# Patient Record
Sex: FEMALE | Race: WHITE | HISPANIC OR LATINO | Employment: OTHER | URBAN - METROPOLITAN AREA
[De-identification: names, ages, dates, MRNs, and addresses within clinical notes are randomized per-mention and may not be internally consistent; named-entity substitution may affect disease eponyms.]

---

## 2017-06-04 ENCOUNTER — APPOINTMENT (OUTPATIENT)
Dept: RADIOLOGY | Facility: MEDICAL CENTER | Age: 82
End: 2017-06-04
Attending: EMERGENCY MEDICINE
Payer: MEDICARE

## 2017-06-04 ENCOUNTER — HOSPITAL ENCOUNTER (EMERGENCY)
Facility: MEDICAL CENTER | Age: 82
End: 2017-06-04
Attending: EMERGENCY MEDICINE
Payer: MEDICARE

## 2017-06-04 VITALS
HEART RATE: 62 BPM | RESPIRATION RATE: 14 BRPM | HEIGHT: 63 IN | BODY MASS INDEX: 26.58 KG/M2 | WEIGHT: 150 LBS | DIASTOLIC BLOOD PRESSURE: 78 MMHG | SYSTOLIC BLOOD PRESSURE: 99 MMHG | TEMPERATURE: 97.3 F | OXYGEN SATURATION: 93 %

## 2017-06-04 DIAGNOSIS — R60.0 BILATERAL EDEMA OF LOWER EXTREMITY: ICD-10-CM

## 2017-06-04 LAB
ALBUMIN SERPL BCP-MCNC: 4 G/DL (ref 3.2–4.9)
ALBUMIN/GLOB SERPL: 1.5 G/DL
ALP SERPL-CCNC: 61 U/L (ref 30–99)
ALT SERPL-CCNC: 16 U/L (ref 2–50)
ANION GAP SERPL CALC-SCNC: 8 MMOL/L (ref 0–11.9)
AST SERPL-CCNC: 22 U/L (ref 12–45)
BASOPHILS # BLD AUTO: 0.6 % (ref 0–1.8)
BASOPHILS # BLD: 0.05 K/UL (ref 0–0.12)
BILIRUB SERPL-MCNC: 0.5 MG/DL (ref 0.1–1.5)
BNP SERPL-MCNC: 25 PG/ML (ref 0–100)
BUN SERPL-MCNC: 10 MG/DL (ref 8–22)
CALCIUM SERPL-MCNC: 9.6 MG/DL (ref 8.5–10.5)
CHLORIDE SERPL-SCNC: 105 MMOL/L (ref 96–112)
CO2 SERPL-SCNC: 26 MMOL/L (ref 20–33)
CREAT SERPL-MCNC: 0.75 MG/DL (ref 0.5–1.4)
EKG IMPRESSION: NORMAL
EOSINOPHIL # BLD AUTO: 0.12 K/UL (ref 0–0.51)
EOSINOPHIL NFR BLD: 1.5 % (ref 0–6.9)
ERYTHROCYTE [DISTWIDTH] IN BLOOD BY AUTOMATED COUNT: 47.7 FL (ref 35.9–50)
GFR SERPL CREATININE-BSD FRML MDRD: >60 ML/MIN/1.73 M 2
GLOBULIN SER CALC-MCNC: 2.7 G/DL (ref 1.9–3.5)
GLUCOSE SERPL-MCNC: 100 MG/DL (ref 65–99)
HCT VFR BLD AUTO: 43 % (ref 37–47)
HGB BLD-MCNC: 14.2 G/DL (ref 12–16)
IMM GRANULOCYTES # BLD AUTO: 0.02 K/UL (ref 0–0.11)
IMM GRANULOCYTES NFR BLD AUTO: 0.2 % (ref 0–0.9)
INR PPP: 1.16 (ref 0.87–1.13)
LYMPHOCYTES # BLD AUTO: 1.65 K/UL (ref 1–4.8)
LYMPHOCYTES NFR BLD: 20 % (ref 22–41)
MCH RBC QN AUTO: 31.1 PG (ref 27–33)
MCHC RBC AUTO-ENTMCNC: 33 G/DL (ref 33.6–35)
MCV RBC AUTO: 94.3 FL (ref 81.4–97.8)
MONOCYTES # BLD AUTO: 0.56 K/UL (ref 0–0.85)
MONOCYTES NFR BLD AUTO: 6.8 % (ref 0–13.4)
NEUTROPHILS # BLD AUTO: 5.83 K/UL (ref 2–7.15)
NEUTROPHILS NFR BLD: 70.9 % (ref 44–72)
NRBC # BLD AUTO: 0 K/UL
NRBC BLD AUTO-RTO: 0 /100 WBC
PLATELET # BLD AUTO: 243 K/UL (ref 164–446)
PMV BLD AUTO: 10.2 FL (ref 9–12.9)
POTASSIUM SERPL-SCNC: 3.7 MMOL/L (ref 3.6–5.5)
PROT SERPL-MCNC: 6.7 G/DL (ref 6–8.2)
PROTHROMBIN TIME: 15.2 SEC (ref 12–14.6)
RBC # BLD AUTO: 4.56 M/UL (ref 4.2–5.4)
SODIUM SERPL-SCNC: 139 MMOL/L (ref 135–145)
WBC # BLD AUTO: 8.2 K/UL (ref 4.8–10.8)

## 2017-06-04 PROCEDURE — 83880 ASSAY OF NATRIURETIC PEPTIDE: CPT

## 2017-06-04 PROCEDURE — 80053 COMPREHEN METABOLIC PANEL: CPT

## 2017-06-04 PROCEDURE — 700102 HCHG RX REV CODE 250 W/ 637 OVERRIDE(OP): Performed by: EMERGENCY MEDICINE

## 2017-06-04 PROCEDURE — 71010 DX-CHEST-PORTABLE (1 VIEW): CPT

## 2017-06-04 PROCEDURE — A9270 NON-COVERED ITEM OR SERVICE: HCPCS | Performed by: EMERGENCY MEDICINE

## 2017-06-04 PROCEDURE — 93970 EXTREMITY STUDY: CPT | Mod: 26 | Performed by: SURGERY

## 2017-06-04 PROCEDURE — 85610 PROTHROMBIN TIME: CPT

## 2017-06-04 PROCEDURE — 36415 COLL VENOUS BLD VENIPUNCTURE: CPT

## 2017-06-04 PROCEDURE — 93005 ELECTROCARDIOGRAM TRACING: CPT | Performed by: EMERGENCY MEDICINE

## 2017-06-04 PROCEDURE — 85025 COMPLETE CBC W/AUTO DIFF WBC: CPT

## 2017-06-04 PROCEDURE — 93970 EXTREMITY STUDY: CPT

## 2017-06-04 PROCEDURE — 99284 EMERGENCY DEPT VISIT MOD MDM: CPT

## 2017-06-04 RX ORDER — FLUCONAZOLE 100 MG/1
200 TABLET ORAL ONCE
Status: COMPLETED | OUTPATIENT
Start: 2017-06-04 | End: 2017-06-04

## 2017-06-04 RX ADMIN — FLUCONAZOLE 200 MG: 100 TABLET ORAL at 17:25

## 2017-06-04 ASSESSMENT — LIFESTYLE VARIABLES: DO YOU DRINK ALCOHOL: NO

## 2017-06-04 NOTE — ED NOTES
Chief Complaint   Patient presents with   • Leg Swelling     since this am. Pt states she woke up to stinging pain on toe, however both legs appear swollen. Today she took laces out of shoes so they could fit.     To triage by wheelchair with family. Denies CP or SOB. VSS. Explained triage process, to waiting room. Asked to inform RN if questions or concerns arise.

## 2017-06-04 NOTE — ED AVS SNAPSHOT
Home Care Instructions                                                                                                                Alyssa Funez   MRN: [id number]    Department:  [facility], Emergency Dept   Date of Visit:  6/4/2017            [facility], Emergency Dept    [address] [address]    Phone:  [phone]      You were seen by     Esau Vickers M.D.      Your Diagnosis Was     Bilateral edema of lower extremity     R60.0       These are the medications you received during your hospitalization from 06/04/2017 1432 to 06/04/2017 1727     Date/Time Order Dose Route Action    06/04/2017 1725 fluconazole (DIFLUCAN) tablet 200 mg 200 mg Oral Given      Follow-up Information     1. Follow up with [facility].    Contact information    [address] [place] [id number] [phone]      Medication Information     Review all of your home medications and newly ordered medications with your primary doctor and/or pharmacist as soon as possible. Follow medication instructions as directed by your doctor and/or pharmacist.     Please keep your complete medication list with you and share with your physician. Update the information when medications are discontinued, doses are changed, or new medications (including over-the-counter products) are added; and carry medication information at all times in the event of emergency situations.               Medication List      ASK your doctor about these medications        Instructions    Morning Afternoon Evening Bedtime    albuterol 108 (90 BASE) MCG/ACT Aers inhalation aerosol        Inhale 2 Puffs by mouth every four hours as needed for Shortness of Breath.   Dose:  2 Puff                        azithromycin 250 MG Tabs   Commonly known as:  ZITHROMAX Z-JENELLE        Please dispensed, 2 tablets on day one then one tablet by mouth daily for the next 4 days                        * nystatin [id number]  UNIT/GM Crea topical cream   Commonly known as:  MYCOSTATIN        Applied to affected areas 2 times a day until resolution                        * nystatin powder   Commonly known as:  MYCOSTATIN        Applied to affected areas 2 times a day until resolution                        tramadol 50 MG Tabs   Commonly known as:  ULTRAM        Take 1-2 Tabs by mouth every four hours as needed.   Dose:   mg                        * Notice:  This list has 2 medication(s) that are the same as other medications prescribed for you. Read the directions carefully, and ask your doctor or other care provider to review them with you.            Procedures and tests performed during your visit     BTYPE NATRIURETIC PEPTIDE    CBC WITH DIFFERENTIAL    COMP METABOLIC PANEL    DX-CHEST-PORTABLE (1 VIEW)    EKG (ER)    ESTIMATED GFR    LE VENOUS DUPLEX (Specify in Comments Left, Right Or Bilateral)    PROTHROMBIN TIME        Discharge Instructions       Peripheral Edema  You have swelling in your legs (peripheral edema). This swelling is due to excess accumulation of salt and water in your body. Edema may be a sign of heart, kidney or liver disease, or a side effect of a medication. It may also be due to problems in the leg veins. Elevating your legs and using special support stockings may be very helpful, if the cause of the swelling is due to poor venous circulation. Avoid long periods of standing, whatever the cause.  Treatment of edema depends on identifying the cause. Chips, pretzels, pickles and other salty foods should be avoided. Restricting salt in your diet is almost always needed. Water pills (diuretics) are often used to remove the excess salt and water from your body via urine. These medicines prevent the kidney from reabsorbing sodium. This increases urine flow.  Diuretic treatment may also result in lowering of potassium levels in your body. Potassium supplements may be needed if you have to use diuretics daily.  Daily weights can help you keep track of your progress in clearing your edema. You should call your caregiver for follow up care as recommended.  SEEK IMMEDIATE MEDICAL CARE IF:   · You have increased swelling, pain, redness, or heat in your legs.  · You develop shortness of breath, especially when lying down.  · You develop chest or abdominal pain, weakness, or fainting.  · You have a fever.     This information is not intended to replace advice given to you by your health care provider. Make sure you discuss any questions you have with your health care provider.     Document Released: 01/25/2006 Document Revised: 03/11/2013 Document Reviewed: 01/05/2011  [employer] Interactive Patient Education ©2016 [employer]            Patient Information     Patient Information    Following emergency treatment: all patient requiring follow-up care must return either to a private physician or a clinic if your condition worsens before you are able to obtain further medical attention, please return to the emergency room.     Billing Information    At [facility], we work to make the billing process streamlined for our patients.  Our Representatives are here to answer any questions you may have regarding your hospital bill.  If you have insurance coverage and have supplied your insurance information to us, we will submit a claim to your insurer on your behalf.  Should you have any questions regarding your bill, we can be reached online or by phone as follows:  Online: You are able pay your bills online or live chat with our representatives about any billing questions you may have. We are here to help Monday - Friday from 8:00am to 7:30pm and 9:00am - 12:00pm on Saturdays.  Please visit [email or web address]  for more information.   Phone:  [phone] or [phone]    Please note that your emergency physician, surgeon, pathologist, radiologist, anesthesiologist, and other specialists are not  employed by [facility] and will therefore bill separately for their services.  Please contact them directly for any questions concerning their bills at the numbers below:     Emergency Physician Services:  [phone]  [facility]:  [phone]  [facility]:  [phone]  [facility]:  [phone]    1. Your final bill may vary from the amount quoted upon discharge if all procedures are not complete at that time, or if your doctor has additional procedures of which we are not aware. You will receive an additional bill if you return to the Emergency Department at [facility] for suture removal regardless of the facility of which the sutures were placed.     2. Please arrange for settlement of this account at the emergency registration.    3. All self-pay accounts are due in full at the time of treatment.  If you are unable to meet this obligation then payment is expected within 4-5 days.     4. If you have had radiology studies (CT, X-ray, Ultrasound, MRI), you have received a preliminary result during your emergency department visit. Please contact the radiology department [phone] to receive a copy of your final result. Please discuss the Final result with your primary physician or with the follow up physician provided.     Crisis Hotline:  [place] Crisis Hotline:  8-894-ZQMJFMT or 1-988.446.8632  [place] Crisis Hotline:    1-905.706.2206 or 072-980-1662         ED Discharge Follow Up Questions    1. In order to provide you with very good care, we would like to follow up with a phone call in the next few days.  May we have your permission to contact you?     YES /  NO    2. What is the best phone number to call you? (       )_____-__________    3. What is the best time to call you?      Morning  /  Afternoon  /  Evening                   Patient Signature:  ____________________________________________________________    Date:   ____________________________________________________________

## 2017-06-04 NOTE — ED AVS SNAPSHOT
Tittat Access Code: [email or web address]  Expires: 7/4/2017  5:27 PM    Your email address is not on file at "[employer]  Email Addresses are required for you to sign up for Tittat, please contact [phone] to verify your personal information and to provide your email address prior to attempting to register for Tittat.    Alyssa Funez  [address] [place] [address]    Tittat  A secure, online tool to manage your health information     "[employer]"’s Tittat® is a secure, online tool that connects you to your personalized health information from the privacy of your home -- day or night - making it very easy for you to manage your healthcare. Once the activation process is completed, you can even access your medical information using the Tittat benjamín, which is available for free in the Apple Benjamín store or Google Play store.     To learn more about Tittat, visit [email or web address]    There are two levels of access available (as shown below):   My Chart Features  [facility] Primary Care Doctor [facility]  Specialists [facility]  Urgent  Care Non-[facility] Primary Care Doctor   Email your healthcare team securely and privately 24/7 X X X    Manage appointments: schedule your next appointment; view details of past/upcoming appointments X      Request prescription refills. X      View recent personal medical records, including lab and immunizations X X X X   View health record, including health history, allergies, medications X X X X   Read reports about your outpatient visits, procedures, consult and ER notes X X X X   See your discharge summary, which is a recap of your hospital and/or ER visit that includes your diagnosis, lab results, and care plan X X  X     How to register for Tittat:  Once your e-mail address has been verified, follow the following steps to sign up for Tittat.     1. Go to  [email or web address]  2. Click on the Sign Up Now box, which takes you to the New Member Sign Up page. You will  need to provide the following information:  a. Enter your [employer] Access Code exactly as it appears at the top of this page. (You will not need to use this code after you’ve completed the sign-up process. If you do not sign up before the expiration date, you must request a new code.)   b. Enter your date of birth.   c. Enter your home email address.   d. Click Submit, and follow the next screen’s instructions.  3. Create a [employer] I [employer] ID. This will be your Voradius login ID and cannot be changed, so think of one that is secure and easy to remember.  4. Create a [employer] password. You can change your password at any time.  5. Enter your Password Reset Question and Answer. This can be used at a later time if you forget your password.   6. Enter your e-mail address. This allows you to receive e-mail notifications when new information is available in [employer].  7. Click Sign Up. You can now view your health information.    For assistance activating your [employer] account, call [phone]

## 2017-06-04 NOTE — ED AVS SNAPSHOT
6/4/2017    Alyssa Funez  [address] [place] [address]    Dear Alyssa:    [facility] wants to ensure your discharge home is safe and you or your loved ones have had all of your questions answered regarding your care after you leave the hospital.    Below is a list of resources and contact information should you have any questions regarding your hospital stay, follow-up instructions, or active medical symptoms.    Questions or Concerns Regarding… Contact   Medical Questions Related to Your Discharge  (7 days a week, 8am-5pm) Contact a Nurse Care Coordinator   [phone]   Medical Questions Not Related to Your Discharge  (24 hours a day / 7 days a week)  Contact the Nurse Health Line   [phone]    Medications or Discharge Instructions Refer to your discharge packet   or contact your [facility] Primary Care Provider   [phone]   Follow-up Appointment(s) Schedule your appointment via Navera   or contact Scheduling [phone]   Billing Review your statement via Navera  or contact Billing [phone]   Medical Records Review your records via Na[employer]   or contact Medical Records [phone]     You may receive a telephone call within two days of discharge. This call is to make certain you understand your discharge instructions and have the opportunity to have any questions answered. You can also easily access your medical information, test results and upcoming appointments via the Navera free online health management tool. You can learn more and sign up at [employer]/Navera. For assistance setting up your Navera account, please call [phone].    Once again, we want to ensure your discharge home is safe and that you have a clear understanding of any next steps in your care. If you have any questions or concerns, please do not hesitate to contact us, we are here for you. Thank you for choosing [facility] for your healthcare needs.    Sincerely,    Your [facility] Healthcare Team

## 2017-06-05 NOTE — ED PROVIDER NOTES
ED Provider Note    CHIEF COMPLAINT  Chief Complaint   Patient presents with   • Leg Swelling     since this am. Pt states she woke up to stinging pain on toe, however both legs appear swollen. Today she took laces out of shoes so they could fit.       HPI  Alyssa Funez is a 83 y.o. female who presents for evaluation of leg swelling bilaterally. She is brought here by her daughter. She noted leg swelling this morning. Her shoes were somewhat tight. In addition she complains of some burning pain to the right left toe area. She denies any history of heart failure she is not short of breath she has no pleuritic pain she is no abdominal pain, no nausea vomiting. She is actually relatively good health she does have a history of dementia. She's had no fever or chills she has a history of lobectomy of her right lung has chronic shortness of breath with exertion she has moderate salt intake in her diet    REVIEW OF SYSTEMS  See HPI for further details. She denies any fever or chills weight loss sore throat she complains of rash underneath her breasts that she thinks is fungal and all other systems reviewed and negative except as noted above    PAST MEDICAL HISTORY  Past Medical History   Diagnosis Date   • S/P lobectomy of lung 1968   • H/O: hysterectomy    • Breath shortness    • Psychiatric disorder      dementia   • Asthma    • MEDICAL HOME        FAMILY HISTORY  No family history on file.    SOCIAL HISTORY  Social History     Social History   • Marital Status:      Spouse Name: N/A   • Number of Children: N/A   • Years of Education: N/A     Social History Main Topics   • Smoking status: Never Smoker    • Smokeless tobacco: None   • Alcohol Use: Yes      Comment: occ   • Drug Use: No   • Sexual Activity: Not Asked     Other Topics Concern   • None     Social History Narrative    ** Merged History Encounter **            SURGICAL HISTORY  Past Surgical History   Procedure Laterality Date   • Tibia plateau orif   "12/3/2009     Performed by IMTIAZ COPPOLA at SURGERY [facility] ORS   • Gyn surgery       hysterectomy   • Other       left  inguinal hernia 30 yrs ago   • Ercp in or  11/29/2011     Performed by STEPHENIE MONTELONGO at SURGERY [facility] ORS   • Pr removal of lung,segmentectomy  1960     right middle lobe. traumatic injury   • Orif, femur  1993     left leg   • Tibia orif  1993     left after GLF   • Hysterectomy radical  1960       CURRENT MEDICATIONS  Home Medications     Reviewed by Apryl Jensen R.N. (Registered Nurse) on 06/04/17 at 1601  Med List Status: Partial    Medication Last Dose Status    albuterol (VENTOLIN OR PROVENTIL) 108 (90 BASE) MCG/ACT Aero Soln inhalation aerosol  Active    azithromycin (ZITHROMAX Z-JENELLE) 250 MG Tab  Active    nystatin (MYCOSTATIN) 863667 UNIT/GM Cream topical cream  Active    nystatin (MYCOSTATIN) powder  Active    tramadol (ULTRAM) 50 MG Tab  Active                 ALLERGIES  Allergies   Allergen Reactions   • Avelox [Moxifloxacin Hcl In Nacl] Hives   • Codeine Vomiting   • Morphine Vomiting     Pt informed during admission interview that she gets \"terribly sick\" , violently nausea and vomiting. Present note for updating allergy status.   • Cimetidine Rash   • Codeine    • Prednisone Anaphylaxis       PHYSICAL EXAM  VITAL SIGNS: BP 99/78 mmHg  Pulse 62  Temp(Src) 36.3 °C (97.3 °F) (Temporal)  Resp 14  Ht 1.6 m (5' 2.99\")  Wt 68.04 kg (150 lb)  BMI 26.58 kg/m2  SpO2 93%  Constitutional :  Well developed, Well nourished, No acute distress, Non-toxic appearance.   HENT: Head is atraumatic normocephalic moist mucous membranes  Eyes: Normal-appearing nonicteric  Neck: Normal range of motion, No tenderness, Supple, No stridor.   Lymphatic: No cervical or supraclavicular adenopathy.   Cardiovascular: Normal heart rate, Normal rhythm, No murmurs, No rubs, No gallops.   Thorax & Lungs: Equal breath sounds bilaterally no rales rhonchi  Skin: Warm, Dry, erythematous " dermatitis and findings of possible fungal infection of the right breast  Neurologically she is awake alert without focal findings      LE VENOUS DUPLEX (Specify in Comments Left, Right Or Bilateral)   Preliminary Result      DX-CHEST-PORTABLE (1 VIEW)   Final Result      No acute cardiac or pulmonary abnormalities are identified.        Results for orders placed or performed during the hospital encounter of 06/04/17   CBC WITH DIFFERENTIAL   Result Value Ref Range    WBC 8.2 4.8 - 10.8 K/uL    RBC 4.56 4.20 - 5.40 M/uL    Hemoglobin 14.2 12.0 - 16.0 g/dL    Hematocrit 43.0 37.0 - 47.0 %    MCV 94.3 81.4 - 97.8 fL    MCH 31.1 27.0 - 33.0 pg    MCHC 33.0 (L) 33.6 - 35.0 g/dL    RDW 47.7 35.9 - 50.0 fL    Platelet Count 243 164 - 446 K/uL    MPV 10.2 9.0 - 12.9 fL    Neutrophils-Polys 70.90 44.00 - 72.00 %    Lymphocytes 20.00 (L) 22.00 - 41.00 %    Monocytes 6.80 0.00 - 13.40 %    Eosinophils 1.50 0.00 - 6.90 %    Basophils 0.60 0.00 - 1.80 %    Immature Granulocytes 0.20 0.00 - 0.90 %    Nucleated RBC 0.00 /100 WBC    Neutrophils (Absolute) 5.83 2.00 - 7.15 K/uL    Lymphs (Absolute) 1.65 1.00 - 4.80 K/uL    Monos (Absolute) 0.56 0.00 - 0.85 K/uL    Eos (Absolute) 0.12 0.00 - 0.51 K/uL    Baso (Absolute) 0.05 0.00 - 0.12 K/uL    Immature Granulocytes (abs) 0.02 0.00 - 0.11 K/uL    NRBC (Absolute) 0.00 K/uL   COMP METABOLIC PANEL   Result Value Ref Range    Sodium 139 135 - 145 mmol/L    Potassium 3.7 3.6 - 5.5 mmol/L    Chloride 105 96 - 112 mmol/L    Co2 26 20 - 33 mmol/L    Anion Gap 8.0 0.0 - 11.9    Glucose 100 (H) 65 - 99 mg/dL    Bun 10 8 - 22 mg/dL    Creatinine 0.75 0.50 - 1.40 mg/dL    Calcium 9.6 8.5 - 10.5 mg/dL    AST(SGOT) 22 12 - 45 U/L    ALT(SGPT) 16 2 - 50 U/L    Alkaline Phosphatase 61 30 - 99 U/L    Total Bilirubin 0.5 0.1 - 1.5 mg/dL    Albumin 4.0 3.2 - 4.9 g/dL    Total Protein 6.7 6.0 - 8.2 g/dL    Globulin 2.7 1.9 - 3.5 g/dL    A-G Ratio 1.5 g/dL   BTYPE NATRIURETIC PEPTIDE   Result Value Ref  Range    B Natriuretic Peptide 25 0 - 100 pg/mL   PROTHROMBIN TIME   Result Value Ref Range    PT 15.2 (H) 12.0 - 14.6 sec    INR 1.16 (H) 0.87 - 1.13   ESTIMATED GFR   Result Value Ref Range    GFR If African American >60 >60 mL/min/1.73 m 2    GFR If Non African American >60 >60 mL/min/1.73 m 2   EKG (ER)   Result Value Ref Range    Report       [facility] Emergency Dept.    Test Date:  2017  Pt Name:    BABS LAU             Department: ER  MRN:        [id number]                      Room:        14  Gender:     F                            Technician: [id number]  :        1933                   Requested By:DOMINICK LINDA  Order #:    [id number]                    Reading MD:    Measurements  Intervals                                Axis  Rate:       63                           P:          56  GA:         184                          QRS:        -37  QRSD:       102                          T:          53  QT:         472  QTc:        484    Interpretive Statements  SINUS RHYTHM  PROBABLE LEFT ATRIAL ABNORMALITY  INCOMPLETE RBBB AND LAFB  BORDERLINE LOW VOLTAGE IN FRONTAL LEADS  Compared to ECG 2012 03:44:05  Incomplete right bundle-branch block now present  Right bundle-branch block now present  Sinus bradycardia no longer present         EKG interpretation 12-lead tracing sinus rhythm rate 63 incomplete right bundle branch block and left anterior fascicular block borderline low voltage in frontal leads no evidence of acute ST elevation compared to previous EKG to 12  Incomplete right bundle impression is abnormal EKG no evidence of ischemia      COURSE & MEDICAL DECISION MAKING  Pertinent Labs & Imaging studies reviewed. (See chart for details)  Patient is presenting with a complaint of lower leg swelling does not appear particularly severe. The patient has no evidence of renal failure, heart failure. I suspect her swelling is from dependent edema and  previous surgery to her right leg. She is advised to limit her salt intake keep her feet elevated in addition her him to give her a dose of Diflucan for that appears to be a possible candidal infection under her right breast. She is otherwise discharged stable condition. She also had noninvasive studies done which showed no evidence of DVT as a cause of her swelling to the right or left leg. She has no evidence of arterial insufficiency. She is discharged to follow-up with her primary care physician    FINAL IMPRESSION  1. Edema probably dependent in nature  2. Fungal infection right breast  3.      Electronically signed by: Esau Vickers, 6/4/2017

## 2017-06-05 NOTE — ED NOTES
Pt medicated prior to discharge. Pt given discharge instructions; verbalized understanding. RN to answer any questions pt and family had. Pt instructed on care at home and follow up info. VSS. Pt wheeled out to front lobby.

## 2017-06-05 NOTE — DISCHARGE INSTRUCTIONS
Peripheral Edema  You have swelling in your legs (peripheral edema). This swelling is due to excess accumulation of salt and water in your body. Edema may be a sign of heart, kidney or liver disease, or a side effect of a medication. It may also be due to problems in the leg veins. Elevating your legs and using special support stockings may be very helpful, if the cause of the swelling is due to poor venous circulation. Avoid long periods of standing, whatever the cause.  Treatment of edema depends on identifying the cause. Chips, pretzels, pickles and other salty foods should be avoided. Restricting salt in your diet is almost always needed. Water pills (diuretics) are often used to remove the excess salt and water from your body via urine. These medicines prevent the kidney from reabsorbing sodium. This increases urine flow.  Diuretic treatment may also result in lowering of potassium levels in your body. Potassium supplements may be needed if you have to use diuretics daily. Daily weights can help you keep track of your progress in clearing your edema. You should call your caregiver for follow up care as recommended.  SEEK IMMEDIATE MEDICAL CARE IF:   · You have increased swelling, pain, redness, or heat in your legs.  · You develop shortness of breath, especially when lying down.  · You develop chest or abdominal pain, weakness, or fainting.  · You have a fever.     This information is not intended to replace advice given to you by your health care provider. Make sure you discuss any questions you have with your health care provider.     Document Released: 01/25/2006 Document Revised: 03/11/2013 Document Reviewed: 01/05/2011  [employer] Patient Education ©2016 [employer]

## 2017-11-14 ENCOUNTER — OFFICE VISIT (OUTPATIENT)
Dept: URGENT CARE | Facility: PHYSICIAN GROUP | Age: 82
End: 2017-11-14
Payer: MEDICARE

## 2017-11-14 VITALS — RESPIRATION RATE: 16 BRPM | TEMPERATURE: 98.7 F | OXYGEN SATURATION: 94 % | HEART RATE: 89 BPM

## 2017-11-14 DIAGNOSIS — H54.62 VISION LOSS OF LEFT EYE: ICD-10-CM

## 2017-11-14 DIAGNOSIS — H57.12 LEFT EYE PAIN: ICD-10-CM

## 2017-11-14 PROCEDURE — 99203 OFFICE O/P NEW LOW 30 MIN: CPT | Performed by: PHYSICIAN ASSISTANT

## 2017-11-14 ASSESSMENT — ENCOUNTER SYMPTOMS
NAUSEA: 0
EYE REDNESS: 1
FOREIGN BODY SENSATION: 0
EYE PAIN: 1
EYE ITCHING: 0
DOUBLE VISION: 0
BLURRED VISION: 1
VOMITING: 0
CHILLS: 0
FEVER: 0
PHOTOPHOBIA: 0
EYE DISCHARGE: 1

## 2017-11-14 NOTE — PROGRESS NOTES
Subjective:      Alyssa Funez is a 84 y.o. female who presents with Eye Problem (x1mon L eye swelling, redness, burning)            Patient presents today with left eye discomfort and irritation. She reports that the left eye has been red for the last couple of weeks and seems to be worsening. Also reports mild pain behind the eye. Over the last 2 weeks her vision has changed significantly and she reports difficulty seeing out of her left eye today. Her  passed away recently and she reports that she was crying a lot. She thought that her eyes were red and irritated because of crying. Denies any recent trauma. No fevers or chills. No other complaints.      Eye Problem    The left eye is affected. This is a new problem. The current episode started 1 to 4 weeks ago. The problem occurs constantly. The problem has been gradually worsening. There was no injury mechanism. The pain is mild. There is no known exposure to pink eye. She does not wear contacts. Associated symptoms include blurred vision, an eye discharge and eye redness. Pertinent negatives include no double vision, fever, foreign body sensation, itching, nausea, photophobia, recent URI or vomiting. She has tried nothing for the symptoms. The treatment provided no relief.       Review of Systems   Constitutional: Negative for chills and fever.   Eyes: Positive for blurred vision, pain, discharge and redness. Negative for double vision, photophobia and itching.   Gastrointestinal: Negative for nausea and vomiting.     Allergies:Avelox [moxifloxacin hcl in nacl]; Codeine; Morphine; Cimetidine; and Prednisone    No current Epic-ordered outpatient prescriptions on file.     No current [facility]-ordered facility-administered medications on file.        Past Medical History:   Diagnosis Date   • Asthma    • Breath shortness    • H/O: hysterectomy    • MEDICAL HOME    • Psychiatric disorder     dementia   • S/P lobectomy of lung 1968       Social History    Substance Use Topics   • Smoking status: Never Smoker   • Smokeless tobacco: Never Used   • Alcohol use Yes      Comment: occ       No family status information on file.   History reviewed. No pertinent family history.       Objective:     Pulse 89   Temp 37.1 °C (98.7 °F)   Resp 16   SpO2 94%   Breastfeeding? No      Physical Exam   Constitutional: She is oriented to person, place, and time. She appears well-developed and well-nourished. No distress.   HENT:   Head: Normocephalic and atraumatic.   Eyes: Right eye exhibits no discharge. Left eye exhibits no discharge.   Left pupil slightly dilated and slower to react when compared to the right. Left eye is also mildly erythematous. There is clouding of the iris, preventing further evaluation with the ophthalmoscope.   Neck: Normal range of motion. Neck supple.   Cardiovascular: Normal rate.    Pulmonary/Chest: Effort normal.   Neurological: She is alert and oriented to person, place, and time.   Skin: Skin is warm and dry. She is not diaphoretic.   Psychiatric: She has a normal mood and affect. Her behavior is normal. Judgment and thought content normal.   Nursing note and vitals reviewed.              Assessment/Plan:     1. Vision loss of left eye      Gradual over the last couple of weeks. Discussed options with patient and daughter. They agreed to follow-up with Dr. Connors (optometrist) today.   2. Left eye pain      Mild discomfort for the last couple of weeks. Sent to Dr. Connors for further evaluation and possible referral to ophthalmology.     I'm quite concerned about patient's recent loss of sight in her left eye. Discussed with patient and daughter. Spoke with Dr. Connors, local optometrist, who agrees to see the patient today. He reports that if needed he will arrange for follow-up with ophthalmology. Patient and family agreeable to plan. Patient transported by daughter to Dr. Connors's office.      Please note that this dictation was created using  voice recognition software. I have made every reasonable attempt to correct obvious errors, but I expect that there are errors of grammar and possibly content that I did not discover before finalizing the note.

## 2017-11-22 ENCOUNTER — OFFICE VISIT (OUTPATIENT)
Dept: URGENT CARE | Facility: PHYSICIAN GROUP | Age: 82
End: 2017-11-22
Payer: MEDICARE

## 2017-11-22 VITALS
SYSTOLIC BLOOD PRESSURE: 128 MMHG | HEIGHT: 63 IN | OXYGEN SATURATION: 96 % | RESPIRATION RATE: 12 BRPM | WEIGHT: 146.6 LBS | DIASTOLIC BLOOD PRESSURE: 70 MMHG | HEART RATE: 84 BPM | TEMPERATURE: 99.3 F | BODY MASS INDEX: 25.98 KG/M2

## 2017-11-22 DIAGNOSIS — R06.2 WHEEZING: ICD-10-CM

## 2017-11-22 DIAGNOSIS — R05.9 COUGH: ICD-10-CM

## 2017-11-22 DIAGNOSIS — J40 BRONCHITIS: ICD-10-CM

## 2017-11-22 PROCEDURE — 99214 OFFICE O/P EST MOD 30 MIN: CPT | Performed by: PHYSICIAN ASSISTANT

## 2017-11-22 RX ORDER — ERYTHROMYCIN 500 MG/1
500 TABLET, COATED ORAL 3 TIMES DAILY
Qty: 21 TAB | Refills: 0 | Status: SHIPPED | OUTPATIENT
Start: 2017-11-22 | End: 2017-11-29

## 2017-11-22 RX ORDER — ALBUTEROL SULFATE 90 UG/1
2 AEROSOL, METERED RESPIRATORY (INHALATION) EVERY 6 HOURS PRN
Qty: 8.5 G | Refills: 0 | Status: SHIPPED | OUTPATIENT
Start: 2017-11-22 | End: 2022-09-27

## 2017-11-22 ASSESSMENT — ENCOUNTER SYMPTOMS
HEARTBURN: 0
SHORTNESS OF BREATH: 0
RHINORRHEA: 1
HEMOPTYSIS: 0
COUGH: 1
WHEEZING: 1
HEADACHES: 0
MYALGIAS: 0
EYES NEGATIVE: 1
SORE THROAT: 0
CHILLS: 0
FEVER: 0

## 2017-11-22 NOTE — PROGRESS NOTES
"Subjective:      Alyssa Funez is a 84 y.o. female who presents with Cough (wheezing, sob, green mucus, chest congestion, bilateral ear pain, fever)            Cough   This is a new problem. Episode onset: 2 weeks ago. The problem has been unchanged. The problem occurs constantly. The cough is productive of sputum. Associated symptoms include nasal congestion, rhinorrhea and wheezing. Pertinent negatives include no chest pain, chills, ear congestion, ear pain, fever, headaches, heartburn, hemoptysis, myalgias, postnasal drip, sore throat or shortness of breath. Nothing aggravates the symptoms. She has tried nothing for the symptoms. The treatment provided no relief.       Review of Systems   Constitutional: Negative for chills and fever.   HENT: Positive for rhinorrhea. Negative for ear pain, postnasal drip and sore throat.    Eyes: Negative.    Respiratory: Positive for cough and wheezing. Negative for hemoptysis and shortness of breath.    Cardiovascular: Negative for chest pain.   Gastrointestinal: Negative for heartburn.   Musculoskeletal: Negative for myalgias.   Neurological: Negative for headaches.          Objective:     /70   Pulse 84   Temp 37.4 °C (99.3 °F)   Resp 12   Ht 1.6 m (5' 2.99\")   Wt 66.5 kg (146 lb 9.6 oz)   SpO2 96%   BMI 25.98 kg/m²      Physical Exam   Constitutional: She is oriented to person, place, and time. She appears well-developed and well-nourished.   Patient is well appearing   HENT:   Head: Normocephalic and atraumatic.   Right Ear: External ear normal.   Left Ear: External ear normal.   Mouth/Throat: Oropharynx is clear and moist.   Eyes: Conjunctivae and EOM are normal. Pupils are equal, round, and reactive to light.   Neck: Normal range of motion. Neck supple.   Cardiovascular: Normal rate, regular rhythm and normal heart sounds.    Pulmonary/Chest: Effort normal. No respiratory distress. She has wheezes. She has no rales. She exhibits no tenderness.   Wheezes in " all fields bilaterally   Musculoskeletal: Normal range of motion.   Neurological: She is alert and oriented to person, place, and time.   Skin: Skin is warm and dry.   Psychiatric: She has a normal mood and affect. Her behavior is normal. Judgment and thought content normal.   Nursing note and vitals reviewed.              Assessment/Plan:     1. Bronchitis  Patient is well appearing, stable vital signs, slight elevation of temperature without fever-wearing 3 jackets at time of evaluation.  Patient has wheezing bilaterally without concerning associated symptoms of shortness of breath or chest pain.  Antibiotic as directed.  Monitor symptoms and follow-up immediately for any change in symptoms. Follow-up if symptoms change, get worse or new symptoms develop.    - erythromycin base (E-MYCIN) 500 MG tablet; Take 1 Tab by mouth 3 times a day for 7 days.  Dispense: 21 Tab; Refill: 0    2. Cough    - albuterol 108 (90 Base) MCG/ACT Aero Soln inhalation aerosol; Inhale 2 Puffs by mouth every 6 hours as needed for Shortness of Breath.  Dispense: 8.5 g; Refill: 0    3. Wheezing    - albuterol 108 (90 Base) MCG/ACT Aero Soln inhalation aerosol; Inhale 2 Puffs by mouth every 6 hours as needed for Shortness of Breath.  Dispense: 8.5 g; Refill: 0

## 2018-04-03 ENCOUNTER — PATIENT OUTREACH (OUTPATIENT)
Dept: HEALTH INFORMATION MANAGEMENT | Facility: OTHER | Age: 83
End: 2018-04-03

## 2018-04-03 NOTE — PROGRESS NOTES
Attempt #:Final  [facility] Verified: yes  Verify PCP: no    Communication Preference Obtained: no     Annual Wellness Visit Scheduling  1. Scheduling Status:Not Scheduled. Patient states they are not interested // Joann called and said that Alyssa doesn't want to schedule any type of appt's and that now Alyssa is living with her and is receiving the care that she needs.        Care Gap Scheduling (Attempt to Schedule EACH Overdue Care Gap!)  Health Maintenance Due   Topic Date Due   • Annual Wellness Visit  09/24/1933   • PFT SCREENING-FEV1 AND FEV/FVC RATIO / SPIROMETRY SHOULD BE PERFORMED ANNUALLY  09/24/1951   • IMM DTaP/Tdap/Td Vaccine (1 - Tdap) 09/24/1952   • PAP SMEAR  09/24/1954   • MAMMOGRAM  09/24/1973   • COLONOSCOPY  09/24/1983   • IMM ZOSTER VACCINE  09/24/1993   • BONE DENSITY  09/24/1998   • IMM PNEUMOCOCCAL 65+ (ADULT) LOW/MEDIUM RISK SERIES (1 of 2 - PCV13) 09/24/1998       MyChart Activation: already active

## 2018-04-20 NOTE — PROGRESS NOTES
Outcome: Left Message    Please transfer to Patient Outreach Team at [phone] when patient returns call.

## 2018-06-27 ENCOUNTER — OFFICE VISIT (OUTPATIENT)
Dept: URGENT CARE | Facility: PHYSICIAN GROUP | Age: 83
End: 2018-06-27
Payer: MEDICARE

## 2018-06-27 VITALS
RESPIRATION RATE: 16 BRPM | BODY MASS INDEX: 27.29 KG/M2 | HEART RATE: 92 BPM | OXYGEN SATURATION: 92 % | DIASTOLIC BLOOD PRESSURE: 88 MMHG | TEMPERATURE: 97.8 F | WEIGHT: 154 LBS | HEIGHT: 63 IN | SYSTOLIC BLOOD PRESSURE: 142 MMHG

## 2018-06-27 DIAGNOSIS — J98.8 WHEEZING-ASSOCIATED RESPIRATORY INFECTION (WARI): ICD-10-CM

## 2018-06-27 PROCEDURE — 99214 OFFICE O/P EST MOD 30 MIN: CPT | Performed by: PHYSICIAN ASSISTANT

## 2018-06-27 RX ORDER — ALBUTEROL SULFATE 90 UG/1
2 AEROSOL, METERED RESPIRATORY (INHALATION) EVERY 4 HOURS PRN
Qty: 1 INHALER | Refills: 0 | Status: SHIPPED | OUTPATIENT
Start: 2018-06-27 | End: 2022-09-27

## 2018-06-27 RX ORDER — DOXYCYCLINE HYCLATE 100 MG
100 TABLET ORAL 2 TIMES DAILY
Qty: 20 TAB | Refills: 0 | Status: SHIPPED | OUTPATIENT
Start: 2018-06-27 | End: 2018-07-07

## 2018-06-27 NOTE — PROGRESS NOTES
Chief Complaint   Patient presents with   • Wheezing       HISTORY OF PRESENT ILLNESS: Patient is a 84 y.o. female who presents today because she has a 5-7 day history of wheezing, coughing, shortness of breath.  No significant phlegm production at this time.  She has not been taking any medications for her symptoms.    Patient Active Problem List    Diagnosis Date Noted   • Pneumonia 01/19/2015     Priority: High   • Benign obstructive jaundice 11/28/2011     Priority: High   • Cholecystitis 11/28/2011     Priority: High   • Hypothyroidism 11/28/2011     Priority: High   • Hypoxia 01/19/2015     Priority: Medium   • MEDICAL HOME 01/23/2015   • Hyponatremia 01/19/2015   • Bronchiectasis (HCC) 01/19/2015   • Constipation 01/19/2015   • COPD exacerbation ([facility]) 01/18/2015   • MEDICAL HOME    • Hypoxia 02/01/2012   • SOB (shortness of breath) 02/01/2012   • Cough 02/01/2012   • S/P lobectomy of lung 02/01/2012   • BRONCHIECTASIS 02/01/2012   • Dementia 02/01/2012   • Hyponatremia 11/28/2011   • History of bronchiectasis 11/28/2011   • Obstructive chronic bronchitis with exacerbation (HCC) 02/21/2011   • ASTHMA 02/21/2011       Allergies:Avelox [moxifloxacin hcl in nacl]; Codeine; Morphine; Cimetidine; and Prednisone    Current Outpatient Prescriptions Ordered in [facility]   Medication Sig Dispense Refill   • doxycycline (VIBRAMYCIN) 100 MG Tab Take 1 Tab by mouth 2 times a day for 10 days. 20 Tab 0   • albuterol 108 (90 Base) MCG/ACT Aero Soln inhalation aerosol Inhale 2 Puffs by mouth every four hours as needed. 1 Inhaler 0   • albuterol 108 (90 Base) MCG/ACT Aero Soln inhalation aerosol Inhale 2 Puffs by mouth every 6 hours as needed for Shortness of Breath. 8.5 g 0     No current Epic-ordered facility-administered medications on file.        Past Medical History:   Diagnosis Date   • Asthma    • Breath shortness    • H/O: hysterectomy    • MEDICAL HOME    • Psychiatric disorder     dementia   • S/P lobectomy of lung 1968  "      Social History   Substance Use Topics   • Smoking status: Never Smoker   • Smokeless tobacco: Never Used   • Alcohol use No       No family status information on file.   No family history on file.    ROS:  Review of Systems   Constitutional: Negative for fever, chills, weight loss and malaise/fatigue.   HENT: Negative for ear pain, nosebleeds, congestion, sore throat and neck pain.    Eyes: Negative for blurred vision.   Respiratory: Positive for cough, sputum production, shortness of breath and wheezing.    Cardiovascular: Negative for chest pain, palpitations, orthopnea and leg swelling.   Gastrointestinal: Negative for heartburn, nausea, vomiting and abdominal pain.   Genitourinary: Negative for dysuria, urgency and frequency.     Exam:  Blood pressure 142/88, pulse 92, temperature 36.6 °C (97.8 °F), resp. rate 16, height 1.6 m (5' 3\"), weight 69.9 kg (154 lb), SpO2 92 %.  General:  Well nourished, well developed female in NAD  Head:Normocephalic, atraumatic  Eyes: PERRLA, EOM within normal limits, no conjunctival injection, no scleral icterus, visual fields and acuity grossly intact.  Ears: Normal shape and symmetry, no tenderness, no discharge. External canals are without any significant edema or erythema. Tympanic membranes are without any inflammation, no effusion. Gross auditory acuity is intact  Nose: Symmetrical without tenderness, no discharge.  Mouth: reasonable hygiene, no erythema exudates or tonsillar enlargement.  Neck: no masses, range of motion within normal limits, no tracheal deviation. No obvious thyroid enlargement.  Pulmonary: chest is symmetrical with respiration, she has wheezes bilaterally, expiratory rales in the right lower lobe.    Cardiovascular: regular rate and rhythm without murmurs, rubs, or gallops.  Extremities: no clubbing, cyanosis, or edema.    Please note that this dictation was created using voice recognition software. I have made every reasonable attempt to correct " obvious errors, but I expect that there are errors of grammar and possibly content that I did not discover before finalizing the note.    Assessment/Plan:  1. Wheezing-associated respiratory infection (WARI)  doxycycline (VIBRAMYCIN) 100 MG Tab    albuterol 108 (90 Base) MCG/ACT Aero Soln inhalation aerosol       Followup with primary care in the next 7-10 days if not significantly improving, return to the urgent care or go to the emergency room sooner for any worsening of symptoms.

## 2019-02-01 ENCOUNTER — OFFICE VISIT (OUTPATIENT)
Dept: URGENT CARE | Facility: PHYSICIAN GROUP | Age: 84
End: 2019-02-01
Payer: MEDICARE

## 2019-02-01 VITALS
RESPIRATION RATE: 16 BRPM | WEIGHT: 154 LBS | OXYGEN SATURATION: 94 % | HEIGHT: 63 IN | BODY MASS INDEX: 27.29 KG/M2 | DIASTOLIC BLOOD PRESSURE: 80 MMHG | SYSTOLIC BLOOD PRESSURE: 142 MMHG | TEMPERATURE: 97.9 F | HEART RATE: 92 BPM

## 2019-02-01 DIAGNOSIS — J98.8 WHEEZING-ASSOCIATED RESPIRATORY INFECTION (WARI): ICD-10-CM

## 2019-02-01 PROCEDURE — 99214 OFFICE O/P EST MOD 30 MIN: CPT | Performed by: PHYSICIAN ASSISTANT

## 2019-02-01 RX ORDER — AZITHROMYCIN 250 MG/1
TABLET, FILM COATED ORAL
Qty: 6 TAB | Refills: 0 | Status: ON HOLD | OUTPATIENT
Start: 2019-02-01 | End: 2020-11-20

## 2019-02-01 RX ORDER — ALBUTEROL SULFATE 90 UG/1
2 AEROSOL, METERED RESPIRATORY (INHALATION) EVERY 4 HOURS PRN
Qty: 1 INHALER | Refills: 0 | Status: SHIPPED | OUTPATIENT
Start: 2019-02-01 | End: 2022-09-27

## 2019-02-01 RX ORDER — ERYTHROMYCIN 500 MG/1
500 TABLET, COATED ORAL 2 TIMES DAILY
Qty: 14 TAB | Refills: 0 | Status: SHIPPED | OUTPATIENT
Start: 2019-02-01 | End: 2019-02-01

## 2019-02-01 NOTE — PROGRESS NOTES
Chief Complaint   Patient presents with   • Cough       HISTORY OF PRESENT ILLNESS: Patient is a 85 y.o. female who presents today because she has a more one-month history of waxing and waning cough, but gradually worsening.  She has been taking some over-the-counter cough medications without improvement.  Reports fevers, chills and coughing up phlegm.    Patient Active Problem List    Diagnosis Date Noted   • Pneumonia 01/19/2015     Priority: High   • Benign obstructive jaundice 11/28/2011     Priority: High   • Cholecystitis 11/28/2011     Priority: High   • Hypothyroidism 11/28/2011     Priority: High   • Hypoxia 01/19/2015     Priority: Medium   • MEDICAL HOME 01/23/2015   • Hyponatremia 01/19/2015   • Bronchiectasis (HCC) 01/19/2015   • Constipation 01/19/2015   • COPD exacerbation ([facility]) 01/18/2015   • MEDICAL HOME    • Hypoxia 02/01/2012   • SOB (shortness of breath) 02/01/2012   • Cough 02/01/2012   • S/P lobectomy of lung 02/01/2012   • BRONCHIECTASIS 02/01/2012   • Dementia 02/01/2012   • Hyponatremia 11/28/2011   • History of bronchiectasis 11/28/2011   • Obstructive chronic bronchitis with exacerbation (HCC) 02/21/2011   • ASTHMA 02/21/2011       Allergies:Avelox [moxifloxacin hcl in nacl]; Codeine; Morphine; Cimetidine; and Prednisone    Current Outpatient Prescriptions Ordered in [facility]   Medication Sig Dispense Refill   • erythromycin base (E-MYCIN) 500 MG tablet Take 1 Tab by mouth 2 Times a Day for 7 days. 14 Tab 0   • albuterol 108 (90 Base) MCG/ACT Aero Soln inhalation aerosol Inhale 2 Puffs by mouth every four hours as needed. 1 Inhaler 0   • albuterol 108 (90 Base) MCG/ACT Aero Soln inhalation aerosol Inhale 2 Puffs by mouth every four hours as needed. 1 Inhaler 0   • albuterol 108 (90 Base) MCG/ACT Aero Soln inhalation aerosol Inhale 2 Puffs by mouth every 6 hours as needed for Shortness of Breath. 8.5 g 0     No current Epic-ordered facility-administered medications on file.        Past Medical  "History:   Diagnosis Date   • Asthma    • Breath shortness    • H/O: hysterectomy    • MEDICAL HOME    • Psychiatric disorder     dementia   • S/P lobectomy of lung 1968       Social History   Substance Use Topics   • Smoking status: Never Smoker   • Smokeless tobacco: Never Used   • Alcohol use No       No family status information on file.   No family history on file.    ROS:  Review of Systems   Constitutional: Positive for subjective fever, positive for chills, no weight loss and malaise/fatigue.   HENT: Negative for ear pain, nosebleeds, congestion, sore throat and neck pain.    Eyes: Negative for blurred vision.   Respiratory: Positive for cough, sputum production, shortness of breath and wheezing.    Cardiovascular: Negative for chest pain, palpitations, orthopnea and leg swelling.   Gastrointestinal: Negative for heartburn, nausea, vomiting and abdominal pain.   Genitourinary: Negative for dysuria, urgency and frequency.     Exam:  Blood pressure 142/80, pulse 92, temperature 36.6 °C (97.9 °F), temperature source Temporal, resp. rate 16, height 1.6 m (5' 3\"), weight 69.9 kg (154 lb), SpO2 94 %, not currently breastfeeding.  General:  Well nourished, well developed female in NAD  Head:Normocephalic, atraumatic  Eyes: PERRLA, EOM within normal limits, no conjunctival injection, no scleral icterus, visual fields and acuity grossly intact.  Ears: Normal shape and symmetry, no tenderness, no discharge. External canals are without any significant edema or erythema. Tympanic membranes are without any inflammation, no effusion. Gross auditory acuity is intact  Nose: Symmetrical without tenderness, no discharge.  Nasal mucosa is erythematous and edematous bilaterally  Mouth: reasonable hygiene, no erythema exudates or tonsillar enlargement.  Neck: no masses, range of motion within normal limits, no tracheal deviation. No obvious thyroid enlargement.  Pulmonary: chest is symmetrical with respiration, she has scattered " wheezes and rhonchi bilaterally, not clearing with cough   cardiovascular: regular rate and rhythm without murmurs, rubs, or gallops.  Extremities: no clubbing, cyanosis, or edema.    Please note that this dictation was created using voice recognition software. I have made every reasonable attempt to correct obvious errors, but I expect that there are errors of grammar and possibly content that I did not discover before finalizing the note.    Assessment/Plan:  1. Wheezing-associated respiratory infection (WARI)  erythromycin base (E-MYCIN) 500 MG tablet    albuterol 108 (90 Base) MCG/ACT Aero Soln inhalation aerosol       Followup with primary care in the next 7-10 days if not significantly improving, return to the urgent care or go to the emergency room sooner for any worsening of symptoms.

## 2019-02-08 ENCOUNTER — OFFICE VISIT (OUTPATIENT)
Dept: URGENT CARE | Facility: PHYSICIAN GROUP | Age: 84
End: 2019-02-08
Payer: MEDICARE

## 2019-02-08 VITALS
RESPIRATION RATE: 16 BRPM | DIASTOLIC BLOOD PRESSURE: 88 MMHG | BODY MASS INDEX: 27.29 KG/M2 | SYSTOLIC BLOOD PRESSURE: 136 MMHG | OXYGEN SATURATION: 92 % | HEART RATE: 80 BPM | TEMPERATURE: 98.4 F | WEIGHT: 154 LBS | HEIGHT: 63 IN

## 2019-02-08 DIAGNOSIS — J01.90 ACUTE RHINOSINUSITIS: ICD-10-CM

## 2019-02-08 DIAGNOSIS — J22 LOWER RESPIRATORY INFECTION: ICD-10-CM

## 2019-02-08 PROCEDURE — 99214 OFFICE O/P EST MOD 30 MIN: CPT | Performed by: PHYSICIAN ASSISTANT

## 2019-02-08 RX ORDER — DOXYCYCLINE HYCLATE 100 MG
100 TABLET ORAL 2 TIMES DAILY
Qty: 20 TAB | Refills: 0 | Status: SHIPPED | OUTPATIENT
Start: 2019-02-08 | End: 2019-02-18

## 2019-02-08 ASSESSMENT — ENCOUNTER SYMPTOMS
VOMITING: 0
WHEEZING: 1
COUGH: 1
SORE THROAT: 1
SINUS PAIN: 1
NECK PAIN: 1
HEADACHES: 0

## 2019-02-08 NOTE — PROGRESS NOTES
Subjective:   Alyssa Funez is a 85 y.o. female who presents for Cough        Pt seen in urgent care a weeks ago.  Mild improvement in sx, but reports development of UR symptoms, as well. Sinus pressure, congestion, and worsening cough.      URI    This is a new problem. The current episode started in the past 7 days. The problem has been gradually worsening. There has been no fever. Associated symptoms include congestion, coughing, neck pain, sinus pain, a sore throat and wheezing. Pertinent negatives include no ear pain, headaches, plugged ear sensation or vomiting. Treatments tried: azithromycin. The treatment provided no relief.     Review of Systems   HENT: Positive for congestion, sinus pain and sore throat. Negative for ear pain.    Respiratory: Positive for cough and wheezing.    Gastrointestinal: Negative for vomiting.   Musculoskeletal: Positive for neck pain.   Neurological: Negative for headaches.       PMH:  has a past medical history of Asthma; Breath shortness; H/O: hysterectomy; MEDICAL HOME; Psychiatric disorder; and S/P lobectomy of lung (1968).  MEDS:   Current Outpatient Prescriptions:   •  doxycycline (VIBRAMYCIN) 100 MG Tab, Take 1 Tab by mouth 2 times a day for 10 days., Disp: 20 Tab, Rfl: 0  •  albuterol 108 (90 Base) MCG/ACT Aero Soln inhalation aerosol, Inhale 2 Puffs by mouth every four hours as needed., Disp: 1 Inhaler, Rfl: 0  •  azithromycin (ZITHROMAX) 250 MG Tab, Follow package directions, Disp: 6 Tab, Rfl: 0  •  albuterol 108 (90 Base) MCG/ACT Aero Soln inhalation aerosol, Inhale 2 Puffs by mouth every four hours as needed., Disp: 1 Inhaler, Rfl: 0  •  albuterol 108 (90 Base) MCG/ACT Aero Soln inhalation aerosol, Inhale 2 Puffs by mouth every 6 hours as needed for Shortness of Breath., Disp: 8.5 g, Rfl: 0  ALLERGIES:   Allergies   Allergen Reactions   • Avelox [Moxifloxacin Hcl In Nacl] Hives   • Codeine Vomiting   • Morphine Vomiting     Pt informed during admission interview  "that she gets \"terribly sick\" , violently nausea and vomiting. Present note for updating allergy status.   • Cimetidine Rash   • Prednisone Anaphylaxis     SURGHX:   Past Surgical History:   Procedure Laterality Date   • ERCP IN OR  11/29/2011    Performed by STEPHENIE MONTELONGO at SURGERY [facility]   • TIBIA PLATEAU ORIF  12/3/2009    Performed by IMTIAZ COPPOLA at SURGERY [facility]   • ORIF, FEMUR  1993    left leg   • TIBIA ORIF  1993    left after GLF   • PB REMOVAL OF LUNG,SEGMENTECTOMY  1960    right middle lobe. traumatic injury   • HYSTERECTOMY RADICAL  1960   • GYN SURGERY      hysterectomy   • OTHER      left  inguinal hernia 30 yrs ago     SOCHX:  reports that she has never smoked. She has never used smokeless tobacco. She reports that she does not drink alcohol or use drugs.  FH: Family history was reviewed, no pertinent findings to report   Objective:   /88 (BP Location: Right arm, Patient Position: Sitting, BP Cuff Size: Adult)   Pulse 80   Temp 36.9 °C (98.4 °F) (Temporal)   Resp 16   Ht 1.6 m (5' 3\")   Wt 69.9 kg (154 lb)   SpO2 92%   BMI 27.28 kg/m²   Physical Exam   Constitutional: She appears well-developed and well-nourished.  Non-toxic appearance. She does not have a sickly appearance. She does not appear ill. No distress.   HENT:   Head: Normocephalic and atraumatic.   Right Ear: Tympanic membrane, external ear and ear canal normal.   Left Ear: Tympanic membrane, external ear and ear canal normal.   Nose: Rhinorrhea present. Right sinus exhibits maxillary sinus tenderness. Left sinus exhibits maxillary sinus tenderness.   Mouth/Throat: Uvula is midline, oropharynx is clear and moist and mucous membranes are normal. No tonsillar exudate.   Eyes: Conjunctivae are normal.   Neck: Neck supple.   Cardiovascular: Normal rate, regular rhythm and normal heart sounds.  Exam reveals no gallop and no friction rub.    No murmur heard.  Pulmonary/Chest: Effort normal. No respiratory " distress. She has no decreased breath sounds. She has wheezes in the right upper field, the right middle field, the right lower field, the left upper field, the left middle field and the left lower field. She has rhonchi in the right upper field, the right middle field, the right lower field, the left upper field, the left middle field and the left lower field. She has no rales.   Neurological: She is alert.   Skin: Skin is warm, dry and intact. Capillary refill takes less than 2 seconds. She is not diaphoretic.   Psychiatric: She has a normal mood and affect. Her behavior is normal. Thought content normal.   Vitals reviewed.        Assessment/Plan:   1. Lower respiratory infection  - doxycycline (VIBRAMYCIN) 100 MG Tab; Take 1 Tab by mouth 2 times a day for 10 days.  Dispense: 20 Tab; Refill: 0    2. Acute rhinosinusitis    I reviewed records from 2.1.19. I informed patient that I would like to obtain chest x-ray today to better evaluate for pneumonia.  Patient declines due to increased cost of having CXR done through [employer].      Physical exam consistent with bronchitis as well as rhinosinusitis.  I will treat with doxycycline.  This will also cover pneumonia.  If patient's symptoms worsen at all in the next 48 hours she was instructed to return to clinic for reevaluation.  I would like her to go to the [place] location, if possible, in order to obtain a chest x-ray on site.  If patient's symptoms do not improve in 3 days she was instructed to be reevaluated at the [place] location, again, so a chest x-ray can be obtained.    Pt instructed to complete full course of medication despite symptomatic improvement. Pt to take med with meals to alleviate GI upset. Pt to take a probiotic or eat [employer]’s yogurt/ kefir while taking the medication.    Flonase 1 squirt in each nostril, as instructed in clinic, once a day.  Use nasal saline TID to promote drainage.   Salt water gurgles to soothe sore throat.  Ayr saline gel to  moisturize nasal passage and prevent bleeding.    Differential diagnosis, natural history, supportive care, and indications for immediate follow-up discussed.

## 2020-11-15 ENCOUNTER — HOSPITAL ENCOUNTER (INPATIENT)
Facility: MEDICAL CENTER | Age: 85
LOS: 4 days | DRG: 177 | End: 2020-11-20
Attending: EMERGENCY MEDICINE | Admitting: HOSPITALIST
Payer: MEDICARE

## 2020-11-15 ENCOUNTER — APPOINTMENT (OUTPATIENT)
Dept: RADIOLOGY | Facility: MEDICAL CENTER | Age: 85
DRG: 177 | End: 2020-11-15
Payer: MEDICARE

## 2020-11-15 ENCOUNTER — APPOINTMENT (OUTPATIENT)
Dept: RADIOLOGY | Facility: MEDICAL CENTER | Age: 85
DRG: 177 | End: 2020-11-15
Attending: EMERGENCY MEDICINE
Payer: MEDICARE

## 2020-11-15 DIAGNOSIS — U07.1 COVID-19: ICD-10-CM

## 2020-11-15 LAB
ALBUMIN SERPL BCP-MCNC: 4 G/DL (ref 3.2–4.9)
ALBUMIN/GLOB SERPL: 1.4 G/DL
ALP SERPL-CCNC: 81 U/L (ref 30–99)
ALT SERPL-CCNC: 12 U/L (ref 2–50)
ANION GAP SERPL CALC-SCNC: 10 MMOL/L (ref 7–16)
APPEARANCE UR: ABNORMAL
AST SERPL-CCNC: 23 U/L (ref 12–45)
BACTERIA #/AREA URNS HPF: ABNORMAL /HPF
BASOPHILS # BLD AUTO: 0.2 % (ref 0–1.8)
BASOPHILS # BLD: 0.01 K/UL (ref 0–0.12)
BILIRUB SERPL-MCNC: 0.3 MG/DL (ref 0.1–1.5)
BILIRUB UR QL STRIP.AUTO: NEGATIVE
BUN SERPL-MCNC: 13 MG/DL (ref 8–22)
CALCIUM SERPL-MCNC: 8.6 MG/DL (ref 8.5–10.5)
CHLORIDE SERPL-SCNC: 100 MMOL/L (ref 96–112)
CO2 SERPL-SCNC: 24 MMOL/L (ref 20–33)
COLOR UR: YELLOW
CREAT SERPL-MCNC: 0.78 MG/DL (ref 0.5–1.4)
EOSINOPHIL # BLD AUTO: 0 K/UL (ref 0–0.51)
EOSINOPHIL NFR BLD: 0 % (ref 0–6.9)
EPI CELLS #/AREA URNS HPF: ABNORMAL /HPF
ERYTHROCYTE [DISTWIDTH] IN BLOOD BY AUTOMATED COUNT: 48.8 FL (ref 35.9–50)
GLOBULIN SER CALC-MCNC: 2.9 G/DL (ref 1.9–3.5)
GLUCOSE SERPL-MCNC: 97 MG/DL (ref 65–99)
GLUCOSE UR STRIP.AUTO-MCNC: NEGATIVE MG/DL
HCT VFR BLD AUTO: 42.6 % (ref 37–47)
HGB BLD-MCNC: 14.1 G/DL (ref 12–16)
HYALINE CASTS #/AREA URNS LPF: ABNORMAL /LPF
IMM GRANULOCYTES # BLD AUTO: 0.02 K/UL (ref 0–0.11)
IMM GRANULOCYTES NFR BLD AUTO: 0.3 % (ref 0–0.9)
KETONES UR STRIP.AUTO-MCNC: NEGATIVE MG/DL
LACTATE BLD-SCNC: 0.9 MMOL/L (ref 0.5–2)
LEUKOCYTE ESTERASE UR QL STRIP.AUTO: NEGATIVE
LYMPHOCYTES # BLD AUTO: 1.09 K/UL (ref 1–4.8)
LYMPHOCYTES NFR BLD: 16.7 % (ref 22–41)
MCH RBC QN AUTO: 31 PG (ref 27–33)
MCHC RBC AUTO-ENTMCNC: 33.1 G/DL (ref 33.6–35)
MCV RBC AUTO: 93.6 FL (ref 81.4–97.8)
MICRO URNS: ABNORMAL
MONOCYTES # BLD AUTO: 0.59 K/UL (ref 0–0.85)
MONOCYTES NFR BLD AUTO: 9 % (ref 0–13.4)
NEUTROPHILS # BLD AUTO: 4.83 K/UL (ref 2–7.15)
NEUTROPHILS NFR BLD: 73.8 % (ref 44–72)
NITRITE UR QL STRIP.AUTO: POSITIVE
NRBC # BLD AUTO: 0 K/UL
NRBC BLD-RTO: 0 /100 WBC
PH UR STRIP.AUTO: 6 [PH] (ref 5–8)
PLATELET # BLD AUTO: 186 K/UL (ref 164–446)
PMV BLD AUTO: 10.1 FL (ref 9–12.9)
POTASSIUM SERPL-SCNC: 4.1 MMOL/L (ref 3.6–5.5)
PROT SERPL-MCNC: 6.9 G/DL (ref 6–8.2)
PROT UR QL STRIP: NEGATIVE MG/DL
RBC # BLD AUTO: 4.55 M/UL (ref 4.2–5.4)
RBC # URNS HPF: ABNORMAL /HPF
RBC UR QL AUTO: NEGATIVE
SODIUM SERPL-SCNC: 134 MMOL/L (ref 135–145)
SP GR UR STRIP.AUTO: 1.03
UROBILINOGEN UR STRIP.AUTO-MCNC: 0.2 MG/DL
WBC # BLD AUTO: 6.5 K/UL (ref 4.8–10.8)
WBC #/AREA URNS HPF: ABNORMAL /HPF

## 2020-11-15 PROCEDURE — 87086 URINE CULTURE/COLONY COUNT: CPT

## 2020-11-15 PROCEDURE — 85025 COMPLETE CBC W/AUTO DIFF WBC: CPT

## 2020-11-15 PROCEDURE — 83735 ASSAY OF MAGNESIUM: CPT

## 2020-11-15 PROCEDURE — 700102 HCHG RX REV CODE 250 W/ 637 OVERRIDE(OP): Performed by: EMERGENCY MEDICINE

## 2020-11-15 PROCEDURE — 85379 FIBRIN DEGRADATION QUANT: CPT

## 2020-11-15 PROCEDURE — A9270 NON-COVERED ITEM OR SERVICE: HCPCS | Performed by: EMERGENCY MEDICINE

## 2020-11-15 PROCEDURE — 0241U HCHG SARS-COV-2 COVID-19 NFCT DS RESP RNA 4 TRGT MIC: CPT

## 2020-11-15 PROCEDURE — 83605 ASSAY OF LACTIC ACID: CPT

## 2020-11-15 PROCEDURE — 99285 EMERGENCY DEPT VISIT HI MDM: CPT

## 2020-11-15 PROCEDURE — 81001 URINALYSIS AUTO W/SCOPE: CPT

## 2020-11-15 PROCEDURE — 80053 COMPREHEN METABOLIC PANEL: CPT

## 2020-11-15 PROCEDURE — 87040 BLOOD CULTURE FOR BACTERIA: CPT | Mod: 91

## 2020-11-15 PROCEDURE — 86140 C-REACTIVE PROTEIN: CPT

## 2020-11-15 PROCEDURE — 84484 ASSAY OF TROPONIN QUANT: CPT

## 2020-11-15 PROCEDURE — 36415 COLL VENOUS BLD VENIPUNCTURE: CPT

## 2020-11-15 PROCEDURE — 84145 PROCALCITONIN (PCT): CPT

## 2020-11-15 PROCEDURE — 87186 SC STD MICRODIL/AGAR DIL: CPT

## 2020-11-15 PROCEDURE — 71045 X-RAY EXAM CHEST 1 VIEW: CPT

## 2020-11-15 PROCEDURE — 87077 CULTURE AEROBIC IDENTIFY: CPT

## 2020-11-15 RX ORDER — ACETAMINOPHEN 325 MG/1
650 TABLET ORAL ONCE
Status: COMPLETED | OUTPATIENT
Start: 2020-11-15 | End: 2020-11-15

## 2020-11-15 RX ADMIN — ACETAMINOPHEN 650 MG: 325 TABLET, FILM COATED ORAL at 23:23

## 2020-11-16 PROBLEM — J96.01 ACUTE RESPIRATORY FAILURE WITH HYPOXIA (HCC): Status: ACTIVE | Noted: 2020-11-16

## 2020-11-16 PROBLEM — U07.1 COVID-19: Status: ACTIVE | Noted: 2020-11-16

## 2020-11-16 LAB
COVID ORDER STATUS COVID19: NORMAL
CRP SERPL HS-MCNC: 3.31 MG/DL (ref 0–0.75)
D DIMER PPP IA.FEU-MCNC: 0.8 UG/ML (FEU) (ref 0–0.5)
FLUAV RNA SPEC QL NAA+PROBE: NEGATIVE
FLUBV RNA SPEC QL NAA+PROBE: NEGATIVE
LACTATE BLD-SCNC: 0.8 MMOL/L (ref 0.5–2)
MAGNESIUM SERPL-MCNC: 1.7 MG/DL (ref 1.5–2.5)
PROCALCITONIN SERPL-MCNC: 2.36 NG/ML
RSV RNA SPEC QL NAA+PROBE: NEGATIVE
SARS-COV-2 RNA RESP QL NAA+PROBE: DETECTED
SPECIMEN SOURCE: ABNORMAL
TROPONIN T SERPL-MCNC: 17 NG/L (ref 6–19)

## 2020-11-16 PROCEDURE — 83520 IMMUNOASSAY QUANT NOS NONAB: CPT

## 2020-11-16 PROCEDURE — A9270 NON-COVERED ITEM OR SERVICE: HCPCS | Performed by: STUDENT IN AN ORGANIZED HEALTH CARE EDUCATION/TRAINING PROGRAM

## 2020-11-16 PROCEDURE — 700102 HCHG RX REV CODE 250 W/ 637 OVERRIDE(OP): Performed by: STUDENT IN AN ORGANIZED HEALTH CARE EDUCATION/TRAINING PROGRAM

## 2020-11-16 PROCEDURE — 96367 TX/PROPH/DG ADDL SEQ IV INF: CPT

## 2020-11-16 PROCEDURE — 700102 HCHG RX REV CODE 250 W/ 637 OVERRIDE(OP): Performed by: HOSPITALIST

## 2020-11-16 PROCEDURE — 700111 HCHG RX REV CODE 636 W/ 250 OVERRIDE (IP): Performed by: EMERGENCY MEDICINE

## 2020-11-16 PROCEDURE — 94760 N-INVAS EAR/PLS OXIMETRY 1: CPT

## 2020-11-16 PROCEDURE — 770014 HCHG ROOM/CARE - WARD (150)

## 2020-11-16 PROCEDURE — 700105 HCHG RX REV CODE 258: Performed by: EMERGENCY MEDICINE

## 2020-11-16 PROCEDURE — 700105 HCHG RX REV CODE 258: Performed by: HOSPITALIST

## 2020-11-16 PROCEDURE — 700111 HCHG RX REV CODE 636 W/ 250 OVERRIDE (IP): Performed by: HOSPITALIST

## 2020-11-16 PROCEDURE — 96372 THER/PROPH/DIAG INJ SC/IM: CPT

## 2020-11-16 PROCEDURE — 99221 1ST HOSP IP/OBS SF/LOW 40: CPT | Mod: AI | Performed by: HOSPITALIST

## 2020-11-16 PROCEDURE — A9270 NON-COVERED ITEM OR SERVICE: HCPCS | Performed by: HOSPITALIST

## 2020-11-16 PROCEDURE — 96365 THER/PROPH/DIAG IV INF INIT: CPT

## 2020-11-16 PROCEDURE — 94640 AIRWAY INHALATION TREATMENT: CPT

## 2020-11-16 PROCEDURE — 36415 COLL VENOUS BLD VENIPUNCTURE: CPT

## 2020-11-16 PROCEDURE — 700111 HCHG RX REV CODE 636 W/ 250 OVERRIDE (IP): Performed by: STUDENT IN AN ORGANIZED HEALTH CARE EDUCATION/TRAINING PROGRAM

## 2020-11-16 RX ORDER — ONDANSETRON 4 MG/1
4 TABLET, ORALLY DISINTEGRATING ORAL EVERY 4 HOURS PRN
Status: DISCONTINUED | OUTPATIENT
Start: 2020-11-16 | End: 2020-11-16

## 2020-11-16 RX ORDER — FAMOTIDINE 20 MG/1
40 TABLET, FILM COATED ORAL DAILY
Status: DISCONTINUED | OUTPATIENT
Start: 2020-11-16 | End: 2020-11-16

## 2020-11-16 RX ORDER — AMOXICILLIN 250 MG
2 CAPSULE ORAL 2 TIMES DAILY
Status: DISCONTINUED | OUTPATIENT
Start: 2020-11-16 | End: 2020-11-18

## 2020-11-16 RX ORDER — POLYETHYLENE GLYCOL 3350 17 G/17G
1 POWDER, FOR SOLUTION ORAL
Status: DISCONTINUED | OUTPATIENT
Start: 2020-11-16 | End: 2020-11-18

## 2020-11-16 RX ORDER — ALBUTEROL SULFATE 90 UG/1
2 AEROSOL, METERED RESPIRATORY (INHALATION)
Status: DISCONTINUED | OUTPATIENT
Start: 2020-11-16 | End: 2020-11-16

## 2020-11-16 RX ORDER — BISACODYL 10 MG
10 SUPPOSITORY, RECTAL RECTAL
Status: DISCONTINUED | OUTPATIENT
Start: 2020-11-16 | End: 2020-11-18

## 2020-11-16 RX ORDER — GUAIFENESIN/DEXTROMETHORPHAN 100-10MG/5
10 SYRUP ORAL EVERY 6 HOURS PRN
Status: DISCONTINUED | OUTPATIENT
Start: 2020-11-16 | End: 2020-11-18

## 2020-11-16 RX ORDER — DEXAMETHASONE 4 MG/1
6 TABLET ORAL DAILY
Status: DISCONTINUED | OUTPATIENT
Start: 2020-11-16 | End: 2020-11-20 | Stop reason: HOSPADM

## 2020-11-16 RX ORDER — ONDANSETRON HYDROCHLORIDE 4 MG/5ML
4 SOLUTION ORAL EVERY 6 HOURS PRN
Status: DISCONTINUED | OUTPATIENT
Start: 2020-11-16 | End: 2020-11-18

## 2020-11-16 RX ORDER — AZITHROMYCIN 500 MG/1
500 INJECTION, POWDER, LYOPHILIZED, FOR SOLUTION INTRAVENOUS ONCE
Status: COMPLETED | OUTPATIENT
Start: 2020-11-16 | End: 2020-11-16

## 2020-11-16 RX ORDER — ASCORBIC ACID 500 MG
2000 TABLET ORAL DAILY
Status: DISCONTINUED | OUTPATIENT
Start: 2020-11-16 | End: 2020-11-16

## 2020-11-16 RX ORDER — AZITHROMYCIN 250 MG/1
500 TABLET, FILM COATED ORAL DAILY
Status: COMPLETED | OUTPATIENT
Start: 2020-11-17 | End: 2020-11-18

## 2020-11-16 RX ORDER — ALBUTEROL SULFATE 90 UG/1
2 AEROSOL, METERED RESPIRATORY (INHALATION) EVERY 4 HOURS
Status: DISCONTINUED | OUTPATIENT
Start: 2020-11-16 | End: 2020-11-19

## 2020-11-16 RX ORDER — ACETAMINOPHEN 325 MG/1
650 TABLET ORAL EVERY 6 HOURS PRN
Status: DISCONTINUED | OUTPATIENT
Start: 2020-11-16 | End: 2020-11-20 | Stop reason: HOSPADM

## 2020-11-16 RX ORDER — ONDANSETRON 2 MG/ML
4 INJECTION INTRAMUSCULAR; INTRAVENOUS EVERY 4 HOURS PRN
Status: DISCONTINUED | OUTPATIENT
Start: 2020-11-16 | End: 2020-11-16

## 2020-11-16 RX ORDER — AMOXICILLIN AND CLAVULANATE POTASSIUM 875; 125 MG/1; MG/1
1 TABLET, FILM COATED ORAL EVERY 12 HOURS
Status: COMPLETED | OUTPATIENT
Start: 2020-11-16 | End: 2020-11-19

## 2020-11-16 RX ORDER — ONDANSETRON 4 MG/1
4 TABLET, ORALLY DISINTEGRATING ORAL ONCE
Status: COMPLETED | OUTPATIENT
Start: 2020-11-16 | End: 2020-11-16

## 2020-11-16 RX ORDER — LABETALOL HYDROCHLORIDE 5 MG/ML
10 INJECTION, SOLUTION INTRAVENOUS EVERY 4 HOURS PRN
Status: DISCONTINUED | OUTPATIENT
Start: 2020-11-16 | End: 2020-11-20 | Stop reason: HOSPADM

## 2020-11-16 RX ADMIN — ALBUTEROL SULFATE 2 PUFF: 90 AEROSOL, METERED RESPIRATORY (INHALATION) at 11:37

## 2020-11-16 RX ADMIN — CEFTRIAXONE SODIUM 1 G: 1 INJECTION, POWDER, FOR SOLUTION INTRAMUSCULAR; INTRAVENOUS at 02:32

## 2020-11-16 RX ADMIN — AMOXICILLIN AND CLAVULANATE POTASSIUM 1 TABLET: 875; 125 TABLET, FILM COATED ORAL at 20:47

## 2020-11-16 RX ADMIN — GUAIFENESIN AND DEXTROMETHORPHAN 10 ML: 100; 10 SYRUP ORAL at 12:05

## 2020-11-16 RX ADMIN — GUAIFENESIN AND DEXTROMETHORPHAN 10 ML: 100; 10 SYRUP ORAL at 22:33

## 2020-11-16 RX ADMIN — AZITHROMYCIN MONOHYDRATE 500 MG: 500 INJECTION, POWDER, LYOPHILIZED, FOR SOLUTION INTRAVENOUS at 03:39

## 2020-11-16 RX ADMIN — ALBUTEROL SULFATE 2 PUFF: 90 AEROSOL, METERED RESPIRATORY (INHALATION) at 03:39

## 2020-11-16 RX ADMIN — DEXAMETHASONE 6 MG: 4 TABLET ORAL at 06:10

## 2020-11-16 RX ADMIN — ONDANSETRON 4 MG: 4 TABLET, ORALLY DISINTEGRATING ORAL at 15:25

## 2020-11-16 RX ADMIN — SODIUM CHLORIDE 3 G: 900 INJECTION INTRAVENOUS at 06:33

## 2020-11-16 RX ADMIN — ENOXAPARIN SODIUM 40 MG: 100 INJECTION SUBCUTANEOUS at 06:33

## 2020-11-16 RX ADMIN — OXYCODONE HYDROCHLORIDE AND ACETAMINOPHEN 2000 MG: 500 TABLET ORAL at 07:03

## 2020-11-16 RX ADMIN — ALBUTEROL SULFATE 2 PUFF: 90 AEROSOL, METERED RESPIRATORY (INHALATION) at 07:03

## 2020-11-16 RX ADMIN — ALBUTEROL SULFATE 2 PUFF: 90 AEROSOL, METERED RESPIRATORY (INHALATION) at 20:48

## 2020-11-16 RX ADMIN — AMOXICILLIN AND CLAVULANATE POTASSIUM 1 TABLET: 875; 125 TABLET, FILM COATED ORAL at 12:05

## 2020-11-16 ASSESSMENT — ENCOUNTER SYMPTOMS
EYE PAIN: 0
SHORTNESS OF BREATH: 0
DEPRESSION: 0
TREMORS: 0
FALLS: 0
BRUISES/BLEEDS EASILY: 0
SINUS PAIN: 0
PHOTOPHOBIA: 0
SORE THROAT: 0
PALPITATIONS: 0
CONSTIPATION: 0
BLOOD IN STOOL: 0
COUGH: 1
VOMITING: 0
DOUBLE VISION: 0
WEAKNESS: 0
HEADACHES: 0
MYALGIAS: 1
SPUTUM PRODUCTION: 0
NAUSEA: 0
NECK PAIN: 0
PND: 0
CHILLS: 0
DIZZINESS: 0
CLAUDICATION: 0
HALLUCINATIONS: 0
ABDOMINAL PAIN: 0
DIARRHEA: 0
DIAPHORESIS: 0
BLURRED VISION: 0
STRIDOR: 0
FLANK PAIN: 0
BACK PAIN: 0
TINGLING: 0
HEMOPTYSIS: 0
FEVER: 0
HEARTBURN: 0
POLYDIPSIA: 0
WHEEZING: 0
ORTHOPNEA: 0

## 2020-11-16 ASSESSMENT — FIBROSIS 4 INDEX: FIB4 SCORE: 3.11

## 2020-11-16 ASSESSMENT — PAIN DESCRIPTION - PAIN TYPE: TYPE: ACUTE PAIN

## 2020-11-16 ASSESSMENT — COPD QUESTIONNAIRES
DURING THE PAST 4 WEEKS HOW MUCH DID YOU FEEL SHORT OF BREATH: NONE/LITTLE OF THE TIME
COPD SCREENING SCORE: 4
HAVE YOU SMOKED AT LEAST 100 CIGARETTES IN YOUR ENTIRE LIFE: NO/DON'T KNOW
DO YOU EVER COUGH UP ANY MUCUS OR PHLEGM?: YES, A FEW DAYS A WEEK OR MONTH

## 2020-11-16 ASSESSMENT — LIFESTYLE VARIABLES: SUBSTANCE_ABUSE: 0

## 2020-11-16 NOTE — ED NOTES
Patient retrieved from [place]. Patient assisted to [facility] via wheelchair. Patient agrees with triage note.    Placed onto monitor.

## 2020-11-16 NOTE — H&P
Hospital Medicine History & Physical Note    Date of Service  11/16/2020    Primary Care Physician  Pcp Pt States None    Consultants  None    Code Status  Full Code    Chief Complaint  Chief Complaint   Patient presents with   • Cough     x 2 days, dry non productive. Flu like s/s. RA 88-89%. Febrile       History of Presenting Illness  87 y.o. female who presented 11/15/2020 with past medical history of asthma, dementia, history of lobectomy of right lung who presents with generalized weakness, body aches, headaches, mild cough for 2 days.  Patient states that her cough is productive and yellow.  She denies any fever, loss of smell, loss of taste, chills, nausea, vomiting, diarrhea, chest pain, abdominal pain or difficulty urinating.  Patient states that she has bilateral lower extremity edema that is been present since June after injuring her right leg.  Chest x-ray found left lower lobe infiltrate    Review of Systems  Review of Systems   Constitutional: Positive for malaise/fatigue. Negative for chills, diaphoresis and fever.   HENT: Negative for congestion, ear discharge, ear pain, hearing loss, nosebleeds, sinus pain, sore throat and tinnitus.    Eyes: Negative for blurred vision, double vision, photophobia and pain.   Respiratory: Positive for cough. Negative for hemoptysis, sputum production, shortness of breath, wheezing and stridor.    Cardiovascular: Negative for chest pain, palpitations, orthopnea, claudication, leg swelling and PND.   Gastrointestinal: Negative for abdominal pain, blood in stool, constipation, diarrhea, heartburn, melena, nausea and vomiting.   Genitourinary: Negative for dysuria, flank pain, frequency, hematuria and urgency.   Musculoskeletal: Positive for myalgias. Negative for back pain, falls, joint pain and neck pain.   Skin: Negative for itching and rash.   Neurological: Negative for dizziness, tingling, tremors, weakness and headaches.   Endo/Heme/Allergies: Negative for  "environmental allergies and polydipsia. Does not bruise/bleed easily.   Psychiatric/Behavioral: Negative for depression, hallucinations, substance abuse and suicidal ideas.       Past Medical History   has a past medical history of Asthma, Breath shortness, H/O: hysterectomy, MEDICAL HOME, Psychiatric disorder, and S/P lobectomy of lung (1968).    Surgical History   has a past surgical history that includes tibia plateau orif (12/3/2009); gyn surgery; other; ercp in or (11/29/2011); pr removal of lung,segmentectomy (1960); orif, femur (1993); tibia orif (1993); and hysterectomy radical (1960).     Family History  family history is not on file.     Social History   reports that she has never smoked. She has never used smokeless tobacco. She reports that she does not drink alcohol or use drugs.    Allergies  Allergies   Allergen Reactions   • Avelox [Moxifloxacin Hcl In Nacl] Hives   • Codeine Vomiting   • Morphine Vomiting     Pt informed during admission interview that she gets \"terribly sick\" , violently nausea and vomiting. Present note for updating allergy status.   • Cimetidine Rash   • Prednisone Anaphylaxis       Medications  Prior to Admission Medications   Prescriptions Last Dose Informant Patient Reported? Taking?   albuterol 108 (90 Base) MCG/ACT Aero Soln inhalation aerosol   No No   Sig: Inhale 2 Puffs by mouth every 6 hours as needed for Shortness of Breath.   albuterol 108 (90 Base) MCG/ACT Aero Soln inhalation aerosol   No No   Sig: Inhale 2 Puffs by mouth every four hours as needed.   albuterol 108 (90 Base) MCG/ACT Aero Soln inhalation aerosol   No No   Sig: Inhale 2 Puffs by mouth every four hours as needed.   azithromycin (ZITHROMAX) 250 MG Tab   No No   Sig: Follow package directions      Facility-Administered Medications: None       Physical Exam  Temp:  [37 °C (98.6 °F)-38.2 °C (100.8 °F)] 37 °C (98.6 °F)  Pulse:  [66-93] 66  Resp:  [17-20] 20  BP: ()/(56-70) 99/58  SpO2:  [87 %-97 %] 96 " %    Physical Exam  Vitals signs and nursing note reviewed.   Constitutional:       General: She is not in acute distress.     Appearance: Normal appearance. She is not ill-appearing, toxic-appearing or diaphoretic.   HENT:      Head: Normocephalic and atraumatic.      Nose: No congestion or rhinorrhea.      Mouth/Throat:      Pharynx: No oropharyngeal exudate or posterior oropharyngeal erythema.   Eyes:      General: No scleral icterus.  Neck:      Musculoskeletal: No neck rigidity or muscular tenderness.      Vascular: No carotid bruit.   Cardiovascular:      Rate and Rhythm: Normal rate and regular rhythm.      Pulses: Normal pulses.      Heart sounds: Normal heart sounds. No murmur. No friction rub. No gallop.    Pulmonary:      Effort: Pulmonary effort is normal. No respiratory distress.      Breath sounds: Normal breath sounds. No stridor. No wheezing or rhonchi.   Abdominal:      General: Abdomen is flat. There is no distension.      Palpations: There is no mass.      Tenderness: There is no abdominal tenderness. There is no left CVA tenderness, guarding or rebound.      Hernia: No hernia is present.   Musculoskeletal: Normal range of motion.         General: No swelling.      Right lower leg: No edema.      Left lower leg: No edema.   Lymphadenopathy:      Cervical: No cervical adenopathy.   Skin:     General: Skin is warm and dry.      Capillary Refill: Capillary refill takes more than 3 seconds.      Coloration: Skin is not jaundiced or pale.      Findings: No bruising or erythema.   Neurological:      Mental Status: She is alert.         Laboratory:  Recent Labs     11/15/20  2300   WBC 6.5   RBC 4.55   HEMOGLOBIN 14.1   HEMATOCRIT 42.6   MCV 93.6   MCH 31.0   MCHC 33.1*   RDW 48.8   PLATELETCT 186   MPV 10.1     Recent Labs     11/15/20  2300   SODIUM 134*   POTASSIUM 4.1   CHLORIDE 100   CO2 24   GLUCOSE 97   BUN 13   CREATININE 0.78   CALCIUM 8.6     Recent Labs     11/15/20  2300   ALTSGPT 12    ASTSGOT 23   ALKPHOSPHAT 81   TBILIRUBIN 0.3   GLUCOSE 97         No results for input(s): NTPROBNP in the last 72 hours.      No results for input(s): TROPONINT in the last 72 hours.    Imaging:  DX-CHEST-PORTABLE (1 VIEW)   Final Result         1.  Left basilar atelectasis or early infiltrate   2.  Atherosclerosis            Assessment/Plan:  I anticipate this patient will require at least two midnights for appropriate medical management, necessitating inpatient admission.    * COVID-19  Assessment & Plan  Monitor O2 status closely  Prone positioning and frequent change position  ISS and ambulation aggressively  Inflammatory markers every 48 hours  Prophylactic Lovenox  Decadron 6 mg for 10 days  Consult ID for Remdisivir       Acute respiratory failure with hypoxia (HCC)  Assessment & Plan  On 2 L of O2 above baseline  Encourage ISS, ambulation and prone positioning    Mild intermittent asthma- (present on admission)  Assessment & Plan  Not in exacerbation continue home inhalers

## 2020-11-16 NOTE — ED NOTES
Unable to collect culture respiratory with gram stain, patient unable to cough up mucus at this time for sample

## 2020-11-16 NOTE — PROGRESS NOTES
87-year-old female with a past medical history of mild asthma, dementia and history of right lobectomy was admitted on 11/15/2020 for mild acute hypoxic respiratory failure.  Patient only requiring 1 L nasal cannula.  Chest x-ray with left atelectasis versus infiltrate.  Procalcitonin high.  Patient was started on p.o. Decadron, and azithromycin/Augmentin antibiotic regimen.  Patient is to be transferred to the ACS for continuity of care.

## 2020-11-16 NOTE — ED NOTES
Med rec completed per pt's home pharmacy ([facility])  Pt unable to participate in an interview   No answer from pt's family

## 2020-11-16 NOTE — ASSESSMENT & PLAN NOTE
Monitor O2 status closely  Prone positioning and frequent change position  ISS and ambulation aggressively  Inflammatory markers every 48 hours  Prophylactic Lovenox  Decadron 6 mg for 10 days  Consider ID consult for remdesivir if patient fails to continue to improve.

## 2020-11-16 NOTE — ED NOTES
Pt requested medication for her cough. Medicated per MAR with robitussin. Also given abx.   Denies further needs.  Updated family via telephone.

## 2020-11-16 NOTE — ED TRIAGE NOTES
Chief Complaint   Patient presents with   • Cough     x 2 days, dry non productive. Flu like s/s. RA 88-89%. Febrile     Pt has flu like s/s w/ dry non productive cough x 2 days. RA oxygen sat 88-89%. Febrile in triage. Hx asthma.    /70   Pulse 93   Temp (!) 38.2 °C (100.8 °F) (Temporal)   Resp 17   SpO2 89%

## 2020-11-16 NOTE — ED NOTES
Assumed care of pt, report received. Rounded on pt. Reported need to use restroom. Pt assisted onto bedside commode. Tolerated well. Returned to bed. VS rechecked and stable.

## 2020-11-16 NOTE — ED NOTES
Mary, employee ID [id number] from Lab called with critical result of COVID positive at 0204. Critical lab result read back to Mary.   Dr. Yeboah notified of critical lab result at 0204.  Critical lab result read back by Dr. Yeboah.

## 2020-11-17 LAB
ALBUMIN SERPL BCP-MCNC: 3.6 G/DL (ref 3.2–4.9)
ALBUMIN/GLOB SERPL: 1.1 G/DL
ALP SERPL-CCNC: 77 U/L (ref 30–99)
ALT SERPL-CCNC: 14 U/L (ref 2–50)
ANION GAP SERPL CALC-SCNC: 10 MMOL/L (ref 7–16)
AST SERPL-CCNC: 27 U/L (ref 12–45)
BASOPHILS # BLD AUTO: 0.2 % (ref 0–1.8)
BASOPHILS # BLD: 0.01 K/UL (ref 0–0.12)
BILIRUB SERPL-MCNC: 0.3 MG/DL (ref 0.1–1.5)
BUN SERPL-MCNC: 15 MG/DL (ref 8–22)
CALCIUM SERPL-MCNC: 8.5 MG/DL (ref 8.5–10.5)
CHLORIDE SERPL-SCNC: 102 MMOL/L (ref 96–112)
CHOLEST SERPL-MCNC: 124 MG/DL (ref 100–199)
CO2 SERPL-SCNC: 24 MMOL/L (ref 20–33)
CREAT SERPL-MCNC: 0.72 MG/DL (ref 0.5–1.4)
EOSINOPHIL # BLD AUTO: 0 K/UL (ref 0–0.51)
EOSINOPHIL NFR BLD: 0 % (ref 0–6.9)
ERYTHROCYTE [DISTWIDTH] IN BLOOD BY AUTOMATED COUNT: 49.6 FL (ref 35.9–50)
EST. AVERAGE GLUCOSE BLD GHB EST-MCNC: 128 MG/DL
GLOBULIN SER CALC-MCNC: 3.2 G/DL (ref 1.9–3.5)
GLUCOSE SERPL-MCNC: 131 MG/DL (ref 65–99)
HBA1C MFR BLD: 6.1 % (ref 0–5.6)
HCT VFR BLD AUTO: 44.3 % (ref 37–47)
HDLC SERPL-MCNC: 53 MG/DL
HGB BLD-MCNC: 13.9 G/DL (ref 12–16)
IMM GRANULOCYTES # BLD AUTO: 0.03 K/UL (ref 0–0.11)
IMM GRANULOCYTES NFR BLD AUTO: 0.5 % (ref 0–0.9)
LDLC SERPL CALC-MCNC: 48 MG/DL
LYMPHOCYTES # BLD AUTO: 0.63 K/UL (ref 1–4.8)
LYMPHOCYTES NFR BLD: 10 % (ref 22–41)
MCH RBC QN AUTO: 30 PG (ref 27–33)
MCHC RBC AUTO-ENTMCNC: 31.4 G/DL (ref 33.6–35)
MCV RBC AUTO: 95.5 FL (ref 81.4–97.8)
MONOCYTES # BLD AUTO: 0.21 K/UL (ref 0–0.85)
MONOCYTES NFR BLD AUTO: 3.3 % (ref 0–13.4)
NEUTROPHILS # BLD AUTO: 5.43 K/UL (ref 2–7.15)
NEUTROPHILS NFR BLD: 86 % (ref 44–72)
NRBC # BLD AUTO: 0 K/UL
NRBC BLD-RTO: 0 /100 WBC
PLATELET # BLD AUTO: 208 K/UL (ref 164–446)
PMV BLD AUTO: 10.6 FL (ref 9–12.9)
POTASSIUM SERPL-SCNC: 4.3 MMOL/L (ref 3.6–5.5)
PROT SERPL-MCNC: 6.8 G/DL (ref 6–8.2)
RBC # BLD AUTO: 4.64 M/UL (ref 4.2–5.4)
SODIUM SERPL-SCNC: 136 MMOL/L (ref 135–145)
TRIGL SERPL-MCNC: 113 MG/DL (ref 0–149)
WBC # BLD AUTO: 6.3 K/UL (ref 4.8–10.8)

## 2020-11-17 PROCEDURE — 36415 COLL VENOUS BLD VENIPUNCTURE: CPT

## 2020-11-17 PROCEDURE — 700102 HCHG RX REV CODE 250 W/ 637 OVERRIDE(OP): Performed by: STUDENT IN AN ORGANIZED HEALTH CARE EDUCATION/TRAINING PROGRAM

## 2020-11-17 PROCEDURE — 83036 HEMOGLOBIN GLYCOSYLATED A1C: CPT

## 2020-11-17 PROCEDURE — 700102 HCHG RX REV CODE 250 W/ 637 OVERRIDE(OP): Performed by: HOSPITALIST

## 2020-11-17 PROCEDURE — A9270 NON-COVERED ITEM OR SERVICE: HCPCS | Performed by: STUDENT IN AN ORGANIZED HEALTH CARE EDUCATION/TRAINING PROGRAM

## 2020-11-17 PROCEDURE — 94760 N-INVAS EAR/PLS OXIMETRY 1: CPT

## 2020-11-17 PROCEDURE — 770014 HCHG ROOM/CARE - WARD (150)

## 2020-11-17 PROCEDURE — 85025 COMPLETE CBC W/AUTO DIFF WBC: CPT

## 2020-11-17 PROCEDURE — 80061 LIPID PANEL: CPT

## 2020-11-17 PROCEDURE — 99233 SBSQ HOSP IP/OBS HIGH 50: CPT | Performed by: HOSPITALIST

## 2020-11-17 PROCEDURE — A9270 NON-COVERED ITEM OR SERVICE: HCPCS | Performed by: HOSPITALIST

## 2020-11-17 PROCEDURE — 700111 HCHG RX REV CODE 636 W/ 250 OVERRIDE (IP): Performed by: HOSPITALIST

## 2020-11-17 PROCEDURE — 80053 COMPREHEN METABOLIC PANEL: CPT

## 2020-11-17 RX ADMIN — ALBUTEROL SULFATE 2 PUFF: 90 AEROSOL, METERED RESPIRATORY (INHALATION) at 05:26

## 2020-11-17 RX ADMIN — DOCUSATE SODIUM 50 MG AND SENNOSIDES 8.6 MG 2 TABLET: 8.6; 5 TABLET, FILM COATED ORAL at 05:26

## 2020-11-17 RX ADMIN — ACETAMINOPHEN 650 MG: 325 TABLET, FILM COATED ORAL at 00:17

## 2020-11-17 RX ADMIN — ALBUTEROL SULFATE 2 PUFF: 90 AEROSOL, METERED RESPIRATORY (INHALATION) at 10:00

## 2020-11-17 RX ADMIN — ALBUTEROL SULFATE 2 PUFF: 90 AEROSOL, METERED RESPIRATORY (INHALATION) at 14:00

## 2020-11-17 RX ADMIN — AMOXICILLIN AND CLAVULANATE POTASSIUM 1 TABLET: 875; 125 TABLET, FILM COATED ORAL at 05:26

## 2020-11-17 RX ADMIN — ENOXAPARIN SODIUM 40 MG: 100 INJECTION SUBCUTANEOUS at 05:25

## 2020-11-17 RX ADMIN — ALBUTEROL SULFATE 2 PUFF: 90 AEROSOL, METERED RESPIRATORY (INHALATION) at 18:00

## 2020-11-17 RX ADMIN — AMOXICILLIN AND CLAVULANATE POTASSIUM 1 TABLET: 875; 125 TABLET, FILM COATED ORAL at 17:19

## 2020-11-17 RX ADMIN — ALBUTEROL SULFATE 2 PUFF: 90 AEROSOL, METERED RESPIRATORY (INHALATION) at 21:58

## 2020-11-17 RX ADMIN — AZITHROMYCIN 500 MG: 250 TABLET, FILM COATED ORAL at 05:26

## 2020-11-17 RX ADMIN — DEXAMETHASONE 6 MG: 4 TABLET ORAL at 05:26

## 2020-11-17 ASSESSMENT — FIBROSIS 4 INDEX: FIB4 SCORE: 3.02

## 2020-11-17 ASSESSMENT — PAIN DESCRIPTION - PAIN TYPE: TYPE: ACUTE PAIN

## 2020-11-17 NOTE — PROGRESS NOTES
87-year-old female with a past medical history of mild asthma, dementia and history of right lobectomy was admitted on 11/15/2020 for mild acute hypoxic respiratory failure secondary to COVID-19 infection.  Patient is requiring 1 L nasal cannula.  Chest x-ray with left atelectasis versus infiltrate.  Procalcitonin high.  Patient was started on p.o. Decadron, and azithromycin/Augmentin antibiotic regimen.  Patient transferred to [facility] today.

## 2020-11-17 NOTE — DISCHARGE PLANNING
Care Transition Team Assessment  Chart review done, patient is +covid. Lives in [place] with daughter, Joann.  Per notes patient has home oxygen.       Information Source  Orientation : Unable to Assess  Information Given By: Other (Comments)(chart review)  Who is responsible for making decisions for patient? : Patient    Readmission Evaluation  Is this a readmission?: No    Elopement Risk  Legal Hold: No    Discharge Preparedness  What is your plan after discharge?: Uncertain - pending medical team collaboration  What are your discharge supports?: Child  Prior Functional Level: Uses Walker    Functional Assesment  Prior Functional Level: Uses Walker    Finances  Financial Barriers to Discharge: No  Prescription Coverage: Yes    Domestic Abuse  Have you ever been the victim of abuse or violence?: No    Psychological Assessment  History of Substance Abuse: None  History of Psychiatric Problems: No    Discharge Risks or Barriers  Patient risk factors: Vulnerable adult    Anticipated Discharge Information  Discharge Disposition: Still a Patient (30)  Discharge Address: [address] [facility]  Discharge Contact Phone Number: [phone]

## 2020-11-17 NOTE — ED NOTES
Pt was given to an ACS nurse. This RN assisted in getting pt to bed. Dinner taken with pt. Pt in ACS in stable condition at this time.

## 2020-11-17 NOTE — PROGRESS NOTES
[facility] Medicine Daily Progress Note    Date of Service  11/17/2020    Chief Complaint  87 y.o. female admitted 11/15/2020 with 2 days of a dry nonproductive cough with flulike symptoms.    Hospital Course  Found to be Covid positive in our emergency department, chest x-ray found left lower lobe infiltrate.  She was admitted to our ACS.  Continues to require supplemental oxygenation.  Is receiving dexamethasone therapy.    Interval Problem Update  No acute overnight events.      Consultants/Specialty  None at this juncture.    Code Status  Full Code    Disposition  Pending hemodynamic stability off supplemental oxygenation.    Review of Systems  No dyspnea on supplemental O2, no chest pain.    Physical Exam  Temp:  [36.1 °C (97 °F)-36.8 °C (98.2 °F)] 36.8 °C (98.2 °F)  Pulse:  [53-69] 66  Resp:  [17-18] 18  BP: (100-123)/(55-71) 100/55  SpO2:  [90 %-99 %] 96 %    General: No acute distress  HEENT atraumatic, normocephalic, pupils equal round reactive to light  Neck: No JVD  Chest: Respirations are unlabored  Cardiac: Physiologic S1 and S2  Abdomen: Soft, nontender, nondistended  Extremities: Without clubbing, cyanosis or edema  Neuro: Cranial nerves II through XII are grossly intact.  Psych: Mildly demented.      Current Facility-Administered Medications:   •  senna-docusate (PERICOLACE or SENOKOT S) 8.6-50 MG per tablet 2 Tab, 2 Tab, Oral, BID, 2 Tab at 11/17/20 0526 **AND** polyethylene glycol/lytes (MIRALAX) PACKET 1 Packet, 1 Packet, Oral, QDAY PRN **AND** magnesium hydroxide (MILK OF MAGNESIA) suspension 30 mL, 30 mL, Oral, QDAY PRN **AND** bisacodyl (DULCOLAX) suppository 10 mg, 10 mg, Rectal, QDAY PRN, Dayanna Anguiano M.D.  •  enoxaparin (LOVENOX) inj 40 mg, 40 mg, Subcutaneous, DAILY, Dayanna Anguiano M.D., 40 mg at 11/17/20 0525  •  acetaminophen (Tylenol) tablet 650 mg, 650 mg, Oral, Q6HRS PRN, Dayanna Anguiano M.D., 650 mg at 11/17/20 0017  •  labetalol (NORMODYNE/TRANDATE) injection 10 mg, 10 mg, Intravenous,  Q4HRS PRN, Dayanna Anguiano M.D.  •  guaiFENesin dextromethorphan (ROBITUSSIN DM) 100-10 MG/5ML syrup 10 mL, 10 mL, Oral, Q6HRS PRN, Dayanna Anguiano M.D., 10 mL at 11/16/20 2233  •  dexamethasone (DECADRON) tablet 6 mg, 6 mg, Oral, DAILY, Dayanna Anguiano M.D., 6 mg at 11/17/20 0526  •  Respiratory Therapy Consult, , Nebulization, Continuous RT, Dayanna Anguiano M.D.  •  azithromycin (ZITHROMAX) tablet 500 mg, 500 mg, Oral, DAILY, Dayanna Anguiano M.D., 500 mg at 11/17/20 0526  •  amoxicillin-clavulanate (AUGMENTIN) 875-125 MG per tablet 1 Tab, 1 Tab, Oral, Q12HRS, Andres Moreno M.D., 1 Tab at 11/17/20 0526  •  ondansetron (ZOFRAN) 4 MG/5ML oral solution SOLN 4 mg, 4 mg, Oral, Q6HRS PRN, Andres Moreno M.D.  •  albuterol inhaler 2 Puff, 2 Puff, Inhalation, Q4HRS, Magan Nolen M.D., 2 Puff at 11/17/20 1000      Fluids    Intake/Output Summary (Last 24 hours) at 11/17/2020 1521  Last data filed at 11/17/2020 1315  Gross per 24 hour   Intake 360 ml   Output --   Net 360 ml       Laboratory  Recent Labs     11/15/20  2300 11/17/20  0751   WBC 6.5 6.3   RBC 4.55 4.64   HEMOGLOBIN 14.1 13.9   HEMATOCRIT 42.6 44.3   MCV 93.6 95.5   MCH 31.0 30.0   MCHC 33.1* 31.4*   RDW 48.8 49.6   PLATELETCT 186 208   MPV 10.1 10.6     Recent Labs     11/15/20  2300 11/17/20  0751   SODIUM 134* 136   POTASSIUM 4.1 4.3   CHLORIDE 100 102   CO2 24 24   GLUCOSE 97 131*   BUN 13 15   CREATININE 0.78 0.72   CALCIUM 8.6 8.5             Recent Labs     11/17/20  0751   TRIGLYCERIDE 113   HDL 53   LDL 48       Imaging  DX-CHEST-PORTABLE (1 VIEW)   Final Result         1.  Left basilar atelectasis or early infiltrate   2.  Atherosclerosis           Assessment/Plan  * COVID-19  Assessment & Plan  Monitor O2 status closely  Prone positioning and frequent change position  ISS and ambulation aggressively  Inflammatory markers every 48 hours  Prophylactic Lovenox  Decadron 6 mg for 10 days  Consider ID consult for remdesivir if patient  fails to continue to improve.    Acute respiratory failure with hypoxia (HCC)  Assessment & Plan  On 2 L of O2 above baseline  Encourage ISS, ambulation and prone positioning    Mild intermittent asthma- (present on admission)  Assessment & Plan  Not in exacerbation continue home inhalers         VTE prophylaxis: LMWH

## 2020-11-18 LAB
ALBUMIN SERPL BCP-MCNC: 3.9 G/DL (ref 3.2–4.9)
ALBUMIN/GLOB SERPL: 1.2 G/DL
ALP SERPL-CCNC: 76 U/L (ref 30–99)
ALT SERPL-CCNC: 20 U/L (ref 2–50)
ANION GAP SERPL CALC-SCNC: 9 MMOL/L (ref 7–16)
AST SERPL-CCNC: 32 U/L (ref 12–45)
BACTERIA UR CULT: ABNORMAL
BACTERIA UR CULT: ABNORMAL
BASOPHILS # BLD AUTO: 0.1 % (ref 0–1.8)
BASOPHILS # BLD: 0.01 K/UL (ref 0–0.12)
BILIRUB SERPL-MCNC: 0.3 MG/DL (ref 0.1–1.5)
BUN SERPL-MCNC: 14 MG/DL (ref 8–22)
CALCIUM SERPL-MCNC: 8.8 MG/DL (ref 8.5–10.5)
CHLORIDE SERPL-SCNC: 102 MMOL/L (ref 96–112)
CO2 SERPL-SCNC: 26 MMOL/L (ref 20–33)
CREAT SERPL-MCNC: 0.71 MG/DL (ref 0.5–1.4)
EOSINOPHIL # BLD AUTO: 0 K/UL (ref 0–0.51)
EOSINOPHIL NFR BLD: 0 % (ref 0–6.9)
ERYTHROCYTE [DISTWIDTH] IN BLOOD BY AUTOMATED COUNT: 50.2 FL (ref 35.9–50)
GLOBULIN SER CALC-MCNC: 3.2 G/DL (ref 1.9–3.5)
GLUCOSE SERPL-MCNC: 100 MG/DL (ref 65–99)
HCT VFR BLD AUTO: 45.2 % (ref 37–47)
HGB BLD-MCNC: 14.4 G/DL (ref 12–16)
IMM GRANULOCYTES # BLD AUTO: 0.05 K/UL (ref 0–0.11)
IMM GRANULOCYTES NFR BLD AUTO: 0.5 % (ref 0–0.9)
LYMPHOCYTES # BLD AUTO: 0.6 K/UL (ref 1–4.8)
LYMPHOCYTES NFR BLD: 6.2 % (ref 22–41)
MCH RBC QN AUTO: 31 PG (ref 27–33)
MCHC RBC AUTO-ENTMCNC: 31.9 G/DL (ref 33.6–35)
MCV RBC AUTO: 97.2 FL (ref 81.4–97.8)
MONOCYTES # BLD AUTO: 0.42 K/UL (ref 0–0.85)
MONOCYTES NFR BLD AUTO: 4.4 % (ref 0–13.4)
NEUTROPHILS # BLD AUTO: 8.57 K/UL (ref 2–7.15)
NEUTROPHILS NFR BLD: 88.8 % (ref 44–72)
NRBC # BLD AUTO: 0 K/UL
NRBC BLD-RTO: 0 /100 WBC
PLATELET # BLD AUTO: 235 K/UL (ref 164–446)
PMV BLD AUTO: 10.1 FL (ref 9–12.9)
POTASSIUM SERPL-SCNC: 4 MMOL/L (ref 3.6–5.5)
PROCALCITONIN SERPL-MCNC: <0.05 NG/ML
PROT SERPL-MCNC: 7.1 G/DL (ref 6–8.2)
RBC # BLD AUTO: 4.65 M/UL (ref 4.2–5.4)
SIGNIFICANT IND 70042: ABNORMAL
SITE SITE: ABNORMAL
SODIUM SERPL-SCNC: 137 MMOL/L (ref 135–145)
SOURCE SOURCE: ABNORMAL
WBC # BLD AUTO: 9.7 K/UL (ref 4.8–10.8)

## 2020-11-18 PROCEDURE — 80053 COMPREHEN METABOLIC PANEL: CPT

## 2020-11-18 PROCEDURE — 94640 AIRWAY INHALATION TREATMENT: CPT

## 2020-11-18 PROCEDURE — 700102 HCHG RX REV CODE 250 W/ 637 OVERRIDE(OP): Performed by: HOSPITALIST

## 2020-11-18 PROCEDURE — 84145 PROCALCITONIN (PCT): CPT

## 2020-11-18 PROCEDURE — 700111 HCHG RX REV CODE 636 W/ 250 OVERRIDE (IP): Performed by: HOSPITALIST

## 2020-11-18 PROCEDURE — A9270 NON-COVERED ITEM OR SERVICE: HCPCS | Performed by: HOSPITALIST

## 2020-11-18 PROCEDURE — 700102 HCHG RX REV CODE 250 W/ 637 OVERRIDE(OP): Performed by: STUDENT IN AN ORGANIZED HEALTH CARE EDUCATION/TRAINING PROGRAM

## 2020-11-18 PROCEDURE — 85025 COMPLETE CBC W/AUTO DIFF WBC: CPT

## 2020-11-18 PROCEDURE — 36415 COLL VENOUS BLD VENIPUNCTURE: CPT

## 2020-11-18 PROCEDURE — 770014 HCHG ROOM/CARE - WARD (150)

## 2020-11-18 PROCEDURE — A9270 NON-COVERED ITEM OR SERVICE: HCPCS | Performed by: STUDENT IN AN ORGANIZED HEALTH CARE EDUCATION/TRAINING PROGRAM

## 2020-11-18 PROCEDURE — 99232 SBSQ HOSP IP/OBS MODERATE 35: CPT | Performed by: HOSPITALIST

## 2020-11-18 RX ORDER — ONDANSETRON 4 MG/1
4 TABLET, ORALLY DISINTEGRATING ORAL EVERY 4 HOURS PRN
Status: DISCONTINUED | OUTPATIENT
Start: 2020-11-18 | End: 2020-11-20 | Stop reason: HOSPADM

## 2020-11-18 RX ORDER — BENZONATATE 100 MG/1
100 CAPSULE ORAL 3 TIMES DAILY PRN
Status: DISCONTINUED | OUTPATIENT
Start: 2020-11-18 | End: 2020-11-20 | Stop reason: HOSPADM

## 2020-11-18 RX ORDER — GUAIFENESIN 600 MG/1
600 TABLET, EXTENDED RELEASE ORAL EVERY 12 HOURS PRN
Status: DISCONTINUED | OUTPATIENT
Start: 2020-11-18 | End: 2020-11-20 | Stop reason: HOSPADM

## 2020-11-18 RX ADMIN — ALBUTEROL SULFATE 2 PUFF: 90 AEROSOL, METERED RESPIRATORY (INHALATION) at 18:40

## 2020-11-18 RX ADMIN — ALBUTEROL SULFATE 2 PUFF: 90 AEROSOL, METERED RESPIRATORY (INHALATION) at 15:33

## 2020-11-18 RX ADMIN — ALBUTEROL SULFATE 2 PUFF: 90 AEROSOL, METERED RESPIRATORY (INHALATION) at 22:00

## 2020-11-18 RX ADMIN — AMOXICILLIN AND CLAVULANATE POTASSIUM 1 TABLET: 875; 125 TABLET, FILM COATED ORAL at 17:22

## 2020-11-18 RX ADMIN — ALBUTEROL SULFATE 2 PUFF: 90 AEROSOL, METERED RESPIRATORY (INHALATION) at 11:03

## 2020-11-18 RX ADMIN — AZITHROMYCIN 500 MG: 250 TABLET, FILM COATED ORAL at 06:09

## 2020-11-18 RX ADMIN — ACETAMINOPHEN 650 MG: 325 TABLET, FILM COATED ORAL at 13:50

## 2020-11-18 RX ADMIN — AMOXICILLIN AND CLAVULANATE POTASSIUM 1 TABLET: 875; 125 TABLET, FILM COATED ORAL at 06:09

## 2020-11-18 RX ADMIN — ALBUTEROL SULFATE 2 PUFF: 90 AEROSOL, METERED RESPIRATORY (INHALATION) at 06:12

## 2020-11-18 RX ADMIN — DEXAMETHASONE 6 MG: 4 TABLET ORAL at 06:11

## 2020-11-18 RX ADMIN — ENOXAPARIN SODIUM 40 MG: 100 INJECTION SUBCUTANEOUS at 06:11

## 2020-11-18 ASSESSMENT — PAIN DESCRIPTION - PAIN TYPE
TYPE: ACUTE PAIN
TYPE: ACUTE PAIN

## 2020-11-18 NOTE — PROGRESS NOTES
Received in bed, aox4.  Assessment as per ACS. Call light within reach. Needs attended. Plan of care discussed and understood.

## 2020-11-18 NOTE — PROGRESS NOTES
Slept well thru the night, ambulated to the bathroom with 1 person assist. With complaints of diarrhea.

## 2020-11-19 LAB
ALBUMIN SERPL BCP-MCNC: 3.8 G/DL (ref 3.2–4.9)
ALBUMIN/GLOB SERPL: 1.3 G/DL
ALP SERPL-CCNC: 72 U/L (ref 30–99)
ALT SERPL-CCNC: 29 U/L (ref 2–50)
ANION GAP SERPL CALC-SCNC: 10 MMOL/L (ref 7–16)
AST SERPL-CCNC: 47 U/L (ref 12–45)
BILIRUB SERPL-MCNC: 0.4 MG/DL (ref 0.1–1.5)
BUN SERPL-MCNC: 13 MG/DL (ref 8–22)
CALCIUM SERPL-MCNC: 8.8 MG/DL (ref 8.5–10.5)
CHLORIDE SERPL-SCNC: 102 MMOL/L (ref 96–112)
CO2 SERPL-SCNC: 26 MMOL/L (ref 20–33)
CREAT SERPL-MCNC: 0.73 MG/DL (ref 0.5–1.4)
ERYTHROCYTE [DISTWIDTH] IN BLOOD BY AUTOMATED COUNT: 49.4 FL (ref 35.9–50)
GLOBULIN SER CALC-MCNC: 2.9 G/DL (ref 1.9–3.5)
GLUCOSE SERPL-MCNC: 118 MG/DL (ref 65–99)
HCT VFR BLD AUTO: 42.7 % (ref 37–47)
HGB BLD-MCNC: 13.6 G/DL (ref 12–16)
IL6 SERPL-MCNC: 2.6 PG/ML
MCH RBC QN AUTO: 30.3 PG (ref 27–33)
MCHC RBC AUTO-ENTMCNC: 31.9 G/DL (ref 33.6–35)
MCV RBC AUTO: 95.1 FL (ref 81.4–97.8)
PLATELET # BLD AUTO: 228 K/UL (ref 164–446)
PMV BLD AUTO: 10.3 FL (ref 9–12.9)
POTASSIUM SERPL-SCNC: 4 MMOL/L (ref 3.6–5.5)
PROT SERPL-MCNC: 6.7 G/DL (ref 6–8.2)
RBC # BLD AUTO: 4.49 M/UL (ref 4.2–5.4)
SODIUM SERPL-SCNC: 138 MMOL/L (ref 135–145)
WBC # BLD AUTO: 8.9 K/UL (ref 4.8–10.8)

## 2020-11-19 PROCEDURE — 700111 HCHG RX REV CODE 636 W/ 250 OVERRIDE (IP): Performed by: HOSPITALIST

## 2020-11-19 PROCEDURE — 80053 COMPREHEN METABOLIC PANEL: CPT

## 2020-11-19 PROCEDURE — 700102 HCHG RX REV CODE 250 W/ 637 OVERRIDE(OP): Performed by: HOSPITALIST

## 2020-11-19 PROCEDURE — 99232 SBSQ HOSP IP/OBS MODERATE 35: CPT | Performed by: HOSPITALIST

## 2020-11-19 PROCEDURE — 94640 AIRWAY INHALATION TREATMENT: CPT

## 2020-11-19 PROCEDURE — A9270 NON-COVERED ITEM OR SERVICE: HCPCS | Performed by: HOSPITALIST

## 2020-11-19 PROCEDURE — 700102 HCHG RX REV CODE 250 W/ 637 OVERRIDE(OP): Performed by: STUDENT IN AN ORGANIZED HEALTH CARE EDUCATION/TRAINING PROGRAM

## 2020-11-19 PROCEDURE — 36415 COLL VENOUS BLD VENIPUNCTURE: CPT

## 2020-11-19 PROCEDURE — A9270 NON-COVERED ITEM OR SERVICE: HCPCS | Performed by: STUDENT IN AN ORGANIZED HEALTH CARE EDUCATION/TRAINING PROGRAM

## 2020-11-19 PROCEDURE — 85027 COMPLETE CBC AUTOMATED: CPT

## 2020-11-19 PROCEDURE — 770014 HCHG ROOM/CARE - WARD (150)

## 2020-11-19 RX ORDER — ALBUTEROL SULFATE 90 UG/1
2 AEROSOL, METERED RESPIRATORY (INHALATION) EVERY 4 HOURS PRN
Status: DISCONTINUED | OUTPATIENT
Start: 2020-11-19 | End: 2020-11-20 | Stop reason: HOSPADM

## 2020-11-19 RX ADMIN — AMOXICILLIN AND CLAVULANATE POTASSIUM 1 TABLET: 875; 125 TABLET, FILM COATED ORAL at 06:09

## 2020-11-19 RX ADMIN — ENOXAPARIN SODIUM 40 MG: 100 INJECTION SUBCUTANEOUS at 06:09

## 2020-11-19 RX ADMIN — ALBUTEROL SULFATE 2 PUFF: 90 AEROSOL, METERED RESPIRATORY (INHALATION) at 06:00

## 2020-11-19 RX ADMIN — AMOXICILLIN AND CLAVULANATE POTASSIUM 1 TABLET: 875; 125 TABLET, FILM COATED ORAL at 17:30

## 2020-11-19 RX ADMIN — ALBUTEROL SULFATE 2 PUFF: 90 AEROSOL, METERED RESPIRATORY (INHALATION) at 17:31

## 2020-11-19 RX ADMIN — DEXAMETHASONE 6 MG: 4 TABLET ORAL at 06:09

## 2020-11-19 RX ADMIN — ALBUTEROL SULFATE 2 PUFF: 90 AEROSOL, METERED RESPIRATORY (INHALATION) at 14:27

## 2020-11-19 NOTE — PROGRESS NOTES
[facility] Medicine Daily Progress Note    Date of Service  11/18/2020    Chief Complaint  87 y.o. female admitted 11/15/2020 with 2 days of a dry nonproductive cough with flulike symptoms.    Hospital Course  Found to be Covid positive in our emergency department, chest x-ray found left lower lobe infiltrate.  She was admitted to our ACS.  Continues to require supplemental oxygenation.  Is receiving dexamethasone therapy.    Interval Problem Update  No acute overnight events.  Ambulatory, occasionally hypotensive, likely close to baseline.      Consultants/Specialty  None at this juncture.    Code Status  Full Code    Disposition  Pending hemodynamic stability off supplemental oxygenation.  Possibly to home in AM.      Review of Systems  No dyspnea on supplemental O2, no chest pain.    Physical Exam  Temp:  [36.6 °C (97.8 °F)-37.2 °C (99 °F)] 37.2 °C (99 °F)  Pulse:  [55-76] 60  Resp:  [16-18] 16  BP: ()/(47-75) 97/49  SpO2:  [91 %-96 %] 95 % on 2L near clavical     General: No acute distress  HEENT atraumatic, normocephalic, pupils equal round reactive to light  Neck: No JVD  Chest: Respirations are unlabored  Cardiac: Physiologic S1 and S2  Abdomen: Soft, nontender, nondistended  Extremities: Without clubbing, cyanosis or edema  Neuro: Cranial nerves II through XII are grossly intact.  Psych: Mildly demented.      Current Facility-Administered Medications:   •  ondansetron (ZOFRAN ODT) dispertab 4 mg, 4 mg, Oral, Q4HRS PRN, Triston Choudhary M.D.  •  guaiFENesin ER (MUCINEX) tablet 600 mg, 600 mg, Oral, Q12HRS PRN, Triston Choudhary M.D.  •  benzonatate (TESSALON) capsule 100 mg, 100 mg, Oral, TID PRN, Triston Choudhary M.D.  •  enoxaparin (LOVENOX) inj 40 mg, 40 mg, Subcutaneous, DAILY, Dayanna Anguiano M.D., 40 mg at 11/18/20 0611  •  acetaminophen (Tylenol) tablet 650 mg, 650 mg, Oral, Q6HRS PRN, Dayanna Anguiano M.D., 650 mg at 11/18/20 1350  •  labetalol (NORMODYNE/TRANDATE) injection 10 mg, 10 mg, Intravenous,  Q4HRS PRN, Dayanna Anguiano M.D.  •  dexamethasone (DECADRON) tablet 6 mg, 6 mg, Oral, DAILY, Dayanna Anguiano M.D., 6 mg at 11/18/20 0611  •  Respiratory Therapy Consult, , Nebulization, Continuous RT, Dayanna Anguiano M.D.  •  amoxicillin-clavulanate (AUGMENTIN) 875-125 MG per tablet 1 Tab, 1 Tab, Oral, Q12HRS, Andres Moreno M.D., 1 Tab at 11/18/20 0609  •  albuterol inhaler 2 Puff, 2 Puff, Inhalation, Q4HRS, Magan Nolen M.D., 2 Puff at 11/18/20 1533      Fluids    Intake/Output Summary (Last 24 hours) at 11/18/2020 1637  Last data filed at 11/17/2020 2149  Gross per 24 hour   Intake 350 ml   Output --   Net 350 ml       Laboratory  Recent Labs     11/15/20  2300 11/17/20  0751 11/18/20  0902   WBC 6.5 6.3 9.7   RBC 4.55 4.64 4.65   HEMOGLOBIN 14.1 13.9 14.4   HEMATOCRIT 42.6 44.3 45.2   MCV 93.6 95.5 97.2   MCH 31.0 30.0 31.0   MCHC 33.1* 31.4* 31.9*   RDW 48.8 49.6 50.2*   PLATELETCT 186 208 235   MPV 10.1 10.6 10.1     Recent Labs     11/15/20  2300 11/17/20  0751 11/18/20  0902   SODIUM 134* 136 137   POTASSIUM 4.1 4.3 4.0   CHLORIDE 100 102 102   CO2 24 24 26   GLUCOSE 97 131* 100*   BUN 13 15 14   CREATININE 0.78 0.72 0.71   CALCIUM 8.6 8.5 8.8             Recent Labs     11/17/20  0751   TRIGLYCERIDE 113   HDL 53   LDL 48       Imaging  DX-CHEST-PORTABLE (1 VIEW)   Final Result         1.  Left basilar atelectasis or early infiltrate   2.  Atherosclerosis           Assessment/Plan  * COVID-19  Assessment & Plan  Monitor O2 status closely  Prone positioning and frequent change position  ISS and ambulation aggressively  Inflammatory markers every 48 hours  Prophylactic Lovenox  Decadron 6 mg for 10 days  Consider ID consult for remdesivir if patient fails to continue to improve.    Acute respiratory failure with hypoxia (HCC)  Assessment & Plan  On 2 L of O2 above baseline  Encourage ISS, ambulation and prone positioning    Mild intermittent asthma- (present on admission)  Assessment & Plan  Not in  exacerbation continue home inhalers         VTE prophylaxis: LMWH

## 2020-11-19 NOTE — PROGRESS NOTES
[facility] Medicine Daily Progress Note    Date of Service  11/19/2020    Chief Complaint  87 y.o. female admitted 11/15/2020 with 2 days of a dry nonproductive cough with flulike symptoms.    Hospital Course  Found to be Covid positive in our emergency department, chest x-ray found left lower lobe infiltrate.  She was admitted to our ACS.  Continues to require supplemental oxygenation.  Is receiving dexamethasone therapy.    Interval Problem Update  No acute overnight events.  Ambulatory, occasionally hypotensive, likely close to baseline.      Consultants/Specialty  None at this juncture.    Code Status  Full Code    Disposition  Pending hemodynamic stability off supplemental oxygenation.  Possibly to home in AM.      Review of Systems  No dyspnea on supplemental O2, no chest pain.    Physical Exam  Temp:  [36.8 °C (98.3 °F)-36.9 °C (98.4 °F)] 36.8 °C (98.3 °F)  Pulse:  [61-88] 88  Resp:  [16-20] 20  BP: (107-136)/(71-76) 107/76  SpO2:  [92 %-99 %] 94 % on 2L    General: No acute distress  HEENT atraumatic, normocephalic, pupils equal round reactive to light  Neck: No JVD  Chest: Respirations are unlabored  Cardiac: Physiologic S1 and S2  Abdomen: Soft, nontender, nondistended  Extremities: Without clubbing, cyanosis or edema  Neuro: Cranial nerves II through XII are grossly intact.  Psych: Mildly demented.      Current Facility-Administered Medications:   •  ondansetron (ZOFRAN ODT) dispertab 4 mg, 4 mg, Oral, Q4HRS PRN, Triston Choudhary M.D.  •  guaiFENesin ER (MUCINEX) tablet 600 mg, 600 mg, Oral, Q12HRS PRN, Triston Choudhary M.D.  •  benzonatate (TESSALON) capsule 100 mg, 100 mg, Oral, TID PRN, Triston Choudhary M.D.  •  enoxaparin (LOVENOX) inj 40 mg, 40 mg, Subcutaneous, DAILY, Dayanna Anguiano M.D., 40 mg at 11/19/20 0609  •  acetaminophen (Tylenol) tablet 650 mg, 650 mg, Oral, Q6HRS PRN, Dayanna Anguiano M.D., 650 mg at 11/18/20 1350  •  labetalol (NORMODYNE/TRANDATE) injection 10 mg, 10 mg, Intravenous, Q4HRS PRN,  Dayanna Anguiano M.D.  •  dexamethasone (DECADRON) tablet 6 mg, 6 mg, Oral, DAILY, Dayanna Anguiano M.D., 6 mg at 11/19/20 0609  •  Respiratory Therapy Consult, , Nebulization, Continuous RT, Dayanna Anguiano M.D.  •  amoxicillin-clavulanate (AUGMENTIN) 875-125 MG per tablet 1 Tab, 1 Tab, Oral, Q12HRS, Andres Moreno M.D., 1 Tab at 11/19/20 0609  •  albuterol inhaler 2 Puff, 2 Puff, Inhalation, Q4HRS, Magan Nolen M.D., 2 Puff at 11/19/20 1427      Fluids  No intake or output data in the 24 hours ending 11/19/20 1539    Laboratory  Recent Labs     11/17/20  0751 11/18/20  0902 11/19/20  0947   WBC 6.3 9.7 8.9   RBC 4.64 4.65 4.49   HEMOGLOBIN 13.9 14.4 13.6   HEMATOCRIT 44.3 45.2 42.7   MCV 95.5 97.2 95.1   MCH 30.0 31.0 30.3   MCHC 31.4* 31.9* 31.9*   RDW 49.6 50.2* 49.4   PLATELETCT 208 235 228   MPV 10.6 10.1 10.3     Recent Labs     11/17/20  0751 11/18/20  0902 11/19/20  0947   SODIUM 136 137 138   POTASSIUM 4.3 4.0 4.0   CHLORIDE 102 102 102   CO2 24 26 26   GLUCOSE 131* 100* 118*   BUN 15 14 13   CREATININE 0.72 0.71 0.73   CALCIUM 8.5 8.8 8.8             Recent Labs     11/17/20  0751   TRIGLYCERIDE 113   HDL 53   LDL 48       Imaging  DX-CHEST-PORTABLE (1 VIEW)   Final Result         1.  Left basilar atelectasis or early infiltrate   2.  Atherosclerosis           Assessment/Plan  * COVID-19  Assessment & Plan  Monitor O2 status closely  Prone positioning and frequent change position  ISS and ambulation aggressively  Inflammatory markers every 48 hours  Prophylactic Lovenox  Decadron 6 mg for 10 days  Consider ID consult for remdesivir if patient fails to continue to improve.    Acute respiratory failure with hypoxia (HCC)  Assessment & Plan  On 2 L of O2 above baseline  Encourage ISS, ambulation and prone positioning    Mild intermittent asthma- (present on admission)  Assessment & Plan  Not in exacerbation continue home inhalers         VTE prophylaxis: LMWH

## 2020-11-20 ENCOUNTER — PHARMACY VISIT (OUTPATIENT)
Dept: PHARMACY | Facility: MEDICAL CENTER | Age: 85
End: 2020-11-20
Payer: COMMERCIAL

## 2020-11-20 VITALS
BODY MASS INDEX: 27.02 KG/M2 | TEMPERATURE: 98.4 F | HEART RATE: 61 BPM | RESPIRATION RATE: 18 BRPM | OXYGEN SATURATION: 92 % | DIASTOLIC BLOOD PRESSURE: 45 MMHG | SYSTOLIC BLOOD PRESSURE: 94 MMHG | WEIGHT: 152.56 LBS

## 2020-11-20 PROBLEM — J96.01 ACUTE RESPIRATORY FAILURE WITH HYPOXIA (HCC): Status: RESOLVED | Noted: 2020-11-16 | Resolved: 2020-11-20

## 2020-11-20 LAB — PROCALCITONIN SERPL-MCNC: <0.05 NG/ML

## 2020-11-20 PROCEDURE — 84145 PROCALCITONIN (PCT): CPT

## 2020-11-20 PROCEDURE — A9270 NON-COVERED ITEM OR SERVICE: HCPCS | Performed by: HOSPITALIST

## 2020-11-20 PROCEDURE — 700111 HCHG RX REV CODE 636 W/ 250 OVERRIDE (IP): Performed by: HOSPITALIST

## 2020-11-20 PROCEDURE — 36415 COLL VENOUS BLD VENIPUNCTURE: CPT

## 2020-11-20 PROCEDURE — RXMED WILLOW AMBULATORY MEDICATION CHARGE: Performed by: HOSPITALIST

## 2020-11-20 PROCEDURE — 700102 HCHG RX REV CODE 250 W/ 637 OVERRIDE(OP): Performed by: HOSPITALIST

## 2020-11-20 PROCEDURE — 99239 HOSP IP/OBS DSCHRG MGMT >30: CPT | Performed by: HOSPITALIST

## 2020-11-20 RX ORDER — BENZONATATE 100 MG/1
100 CAPSULE ORAL 3 TIMES DAILY PRN
Qty: 24 CAP | Refills: 0 | Status: SHIPPED | OUTPATIENT
Start: 2020-11-20 | End: 2022-09-27

## 2020-11-20 RX ORDER — DEXAMETHASONE 6 MG/1
6 TABLET ORAL DAILY
Qty: 10 TAB | Refills: 0 | Status: SHIPPED | OUTPATIENT
Start: 2020-11-25 | End: 2022-09-27

## 2020-11-20 RX ADMIN — DEXAMETHASONE 6 MG: 4 TABLET ORAL at 04:31

## 2020-11-20 RX ADMIN — ENOXAPARIN SODIUM 40 MG: 100 INJECTION SUBCUTANEOUS at 04:29

## 2020-11-20 NOTE — DISCHARGE PLANNING
Meds-to-Beds: Discharge prescription orders listed below delivered to nurse. Covid pt given phone number to call with questions.       Alyssa Funez   Home Medication Instructions JODIE:[id number]    Printed on:11/20/20 1246   Medication Information                      benzonatate (TESSALON) 100 MG Cap  Take 1 Cap by mouth 3 times a day as needed for Cough.             dexamethasone (DECADRON) 6 MG Tab  Take 1 Tab by mouth every day.                 Shelia Carlin, PharmD

## 2020-11-20 NOTE — DISCHARGE INSTRUCTIONS
COVID-19: How to Protect Yourself and Others  Know how it spreads  · There is currently no vaccine to prevent coronavirus disease 2019 (COVID-19).  · The best way to prevent illness is to avoid being exposed to this virus.  · The virus is thought to spread mainly from person-to-person.  ? Between people who are in close contact with one another (within about 6 feet).  ? Through respiratory droplets produced when an infected person coughs, sneezes or talks.  ? These droplets can land in the mouths or noses of people who are nearby or possibly be inhaled into the lungs.  ? Some recent studies have suggested that COVID-19 may be spread by people who are not showing symptoms.  Everyone should  Clean your hands often  · Wash your hands often with soap and water for at least 20 seconds especially after you have been in a public place, or after blowing your nose, coughing, or sneezing.  · If soap and water are not readily available, use a hand  that contains at least 60% alcohol. Cover all surfaces of your hands and rub them together until they feel dry.  · Avoid touching your eyes, nose, and mouth with unwashed hands.  Avoid close contact  · Stay home if you are sick.  · Avoid close contact with people who are sick.  · Put distance between yourself and other people.  ? Remember that some people without symptoms may be able to spread virus.  ? This is especially important for people who are at higher risk of getting very sick.www.cdc.gov/coronavirus/2019-ncov/need-extra-precautions/people-at-higher-risk.html  Cover your mouth and nose with a cloth face cover when around others  · You could spread COVID-19 to others even if you do not feel sick.  · Everyone should wear a cloth face cover when they have to go out in public, for example to the grocery store or to  other necessities.  ? Cloth face coverings should not be placed on young children under age 2, anyone who has trouble breathing, or is unconscious,  incapacitated or otherwise unable to remove the mask without assistance.  · The cloth face cover is meant to protect other people in case you are infected.  · Do NOT use a facemask meant for a healthcare worker.  · Continue to keep about 6 feet between yourself and others. The cloth face cover is not a substitute for social distancing.  Cover coughs and sneezes  · If you are in a private setting and do not have on your cloth face covering, remember to always cover your mouth and nose with a tissue when you cough or sneeze or use the inside of your elbow.  · Throw used tissues in the trash.  · Immediately wash your hands with soap and water for at least 20 seconds. If soap and water are not readily available, clean your hands with a hand  that contains at least 60% alcohol.  Clean and disinfect  · Clean AND disinfect frequently touched surfaces daily. This includes tables, doorknobs, light switches, countertops, handles, desks, phones, keyboards, toilets, faucets, and sinks. www.cdc.gov/coronavirus/2019-ncov/prevent-getting-sick/disinfecting-your-home.html  · If surfaces are dirty, clean them: Use detergent or soap and water prior to disinfection.  · Then, use a household disinfectant. You can see a list of EPA-registered household disinfectants here.  cdc.gov/coronavirus  05/05/2020  This information is not intended to replace advice given to you b    COVID-19 Frequently Asked Questions  COVID-19 (coronavirus disease) is an infection that is caused by a large family of viruses. Some viruses cause illness in people and others cause illness in animals like camels, cats, and bats. In some cases, the viruses that cause illness in animals can spread to humans.  Where did the coronavirus come from?  In December 2019, [place] told the World Health Organization (WHO) of several cases of lung disease (human respiratory illness). These cases were linked to an open seafood and livestock market in the city of [facility]. The  link to the seafood and livestock market suggests that the virus may have spread from animals to humans. However, since that first outbreak in December, the virus has also been shown to spread from person to person.  What is the name of the disease and the virus?  Disease name  Early on, this disease was called novel coronavirus. This is because scientists determined that the disease was caused by a new (novel) respiratory virus. The World Health Organization (WHO) has now named the disease COVID-19, or coronavirus disease.  Virus name  The virus that causes the disease is called severe acute respiratory syndrome coronavirus 2 (SARS-CoV-2).  More information on disease and virus naming  World Health Organization (WHO): www.who.int/emergencies/diseases/novel-coronavirus-2019/technical-guidance/naming-the-coronavirus-disease-(covid-2019)-and-the-virus-that-causes-it  Who is at risk for complications from coronavirus disease?  Some people may be at higher risk for complications from coronavirus disease. This includes older adults and people who have chronic diseases, such as heart disease, diabetes, and lung disease.  If you are at higher risk for complications, take these extra precautions:  · Avoid close contact with people who are sick or have a fever or cough. Stay at least 3-6 ft (1-2 m) away from them, if possible.  · Wash your hands often with soap and water for at least 20 seconds.  · Avoid touching your face, mouth, nose, or eyes.  · Keep supplies on hand at home, such as food, medicine, and cleaning supplies.  · Stay home as much as possible.  · Avoid social gatherings and travel.  How does coronavirus disease spread?  The virus that causes coronavirus disease spreads easily from person to person (is contagious). There are also cases of community-spread disease. This means the disease has spread to:  · People who have no known contact with other infected people.  · People who have not traveled to areas where  there are known cases.  It appears to spread from one person to another through droplets from coughing or sneezing.  Can I get the virus from touching surfaces or objects?  There is still a lot that we do not know about the virus that causes coronavirus disease. Scientists are basing a lot of information on what they know about similar viruses, such as:  · Viruses cannot generally survive on surfaces for long. They need a human body (host) to survive.  · It is more likely that the virus is spread by close contact with people who are sick (direct contact), such as through:  ? Shaking hands or hugging.  ? Breathing in respiratory droplets that travel through the air. This can happen when an infected person coughs or sneezes on or near other people.  · It is less likely that the virus is spread when a person touches a surface or object that has the virus on it (indirect contact). The virus may be able to enter the body if the person touches a surface or object and then touches his or her face, eyes, nose, or mouth.  Can a person spread the virus without having symptoms of the disease?  It may be possible for the virus to spread before a person has symptoms of the disease, but this is most likely not the main way the virus is spreading. It is more likely for the virus to spread by being in close contact with people who are sick and breathing in the respiratory droplets of a sick person's cough or sneeze.  What are the symptoms of coronavirus disease?  Symptoms vary from person to person and can range from mild to severe. Symptoms may include:  · Fever.  · Cough.  · Tiredness, weakness, or fatigue.  · Fast breathing or feeling short of breath.  These symptoms can appear anywhere from 2 to 14 days after you have been exposed to the virus. If you develop symptoms, call your health care provider. People with severe symptoms may need hospital care.  If I am exposed to the virus, how long does it take before symptoms  start?  Symptoms of coronavirus disease may appear anywhere from 2 to 14 days after a person has been exposed to the virus. If you develop symptoms, call your health care provider.  Should I be tested for this virus?  Your health care provider will decide whether to test you based on your symptoms, history of exposure, and your risk factors.  How does a health care provider test for this virus?  Health care providers will collect samples to send for testing. Samples may include:  · Taking a swab of fluid from the nose.  · Taking fluid from the lungs by having you cough up mucus (sputum) into a sterile cup.  · Taking a blood sample.  · Taking a stool or urine sample.  Is there a treatment or vaccine for this virus?  Currently, there is no vaccine to prevent coronavirus disease. Also, there are no medicines like antibiotics or antivirals to treat the virus. A person who becomes sick is given supportive care, which means rest and fluids. A person may also relieve his or her symptoms by using over-the-counter medicines that treat sneezing, coughing, and runny nose. These are the same medicines that a person takes for the common cold.  If you develop symptoms, call your health care provider. People with severe symptoms may need hospital care.  What can I do to protect myself and my family from this virus?         You can protect yourself and your family by taking the same actions that you would take to prevent the spread of other viruses. Take the following actions:  · Wash your hands often with soap and water for at least 20 seconds. If soap and water are not available, use alcohol-based hand .  · Avoid touching your face, mouth, nose, or eyes.  · Cough or sneeze into a tissue, sleeve, or elbow. Do not cough or sneeze into your hand or the air.  ? If you cough or sneeze into a tissue, throw it away immediately and wash your hands.  · Disinfect objects and surfaces that you frequently touch every day.  · Avoid  close contact with people who are sick or have a fever or cough. Stay at least 3-6 ft (1-2 m) away from them, if possible.  · Stay home if you are sick, except to get medical care. Call your health care provider before you get medical care.  · Make sure your vaccines are up to date. Ask your health care provider what vaccines you need.  What should I do if I need to travel?  Follow travel recommendations from your local health authority, the CDC, and WHO.  Travel information and advice  · Centers for Disease Control and Prevention (CDC): www.cdc.gov/coronavirus/2019-ncov/travelers/index.html  · World Health Organization (WHO): www.who.int/emergencies/diseases/novel-coronavirus-2019/travel-advice  Know the risks and take action to protect your health  · You are at higher risk of getting coronavirus disease if you are traveling to areas with an outbreak or if you are exposed to travelers from areas with an outbreak.  · Wash your hands often and practice good hygiene to lower the risk of catching or spreading the virus.  What should I do if I am sick?  General instructions to stop the spread of infection  · Wash your hands often with soap and water for at least 20 seconds. If soap and water are not available, use alcohol-based hand .  · Cough or sneeze into a tissue, sleeve, or elbow. Do not cough or sneeze into your hand or the air.  · If you cough or sneeze into a tissue, throw it away immediately and wash your hands.  · Stay home unless you must get medical care. Call your health care provider or local health authority before you get medical care.  · Avoid public areas. Do not take public transportation, if possible.  · If you can, wear a mask if you must go out of the house or if you are in close contact with someone who is not sick.  Keep your home clean  · Disinfect objects and surfaces that are frequently touched every day. This may include:  ? Counters and tables.  ? Doorknobs and light  switches.  ? Sinks and faucets.  ? Electronics such as phones, remote controls, keyboards, computers, and tablets.  · Wash dishes in hot, soapy water or use a . Air-dry your dishes.  · Wash laundry in hot water.  Prevent infecting other household members  · Let healthy household members care for children and pets, if possible. If you have to care for children or pets, wash your hands often and wear a mask.  · Sleep in a different bedroom or bed, if possible.  · Do not share personal items, such as razors, toothbrushes, deodorant, singh, brushes, towels, and washcloths.  Where to find more information  Centers for Disease Control and Prevention (CDC)  · Information and news updates: www.cdc.gov/coronavirus/2019-ncov  World Health Organization (WHO)  · Information and news updates: www.who.int/emergencies/diseases/novel-coronavirus-2019  · Coronavirus health topic: www.who.int/health-topics/coronavirus  · Questions and answers on COVID-19: www.who.int/news-room/q-a-detail/g-k-nabrgccmhrhyh  · Global tracker: who.[employer]  American Academy of Pediatrics (AAP)  · Information for families: www.healthychildren.org/English/health-issues/conditions/chest-lungs/Pages/2019-Novel-Coronavirus.aspx  The coronavirus situation is changing rapidly. Check your local health authority website or the CDC and WHO websites for updates and news.  When should I contact a health care provider?  · Contact your health care provider if you have symptoms of an infection, such as fever or cough, and you:  ? Have been near anyone who is known to have coronavirus disease.  ? Have come into contact with a person who is suspected to have coronavirus disease.  ? Have traveled outside of the country.  When should I get emergency medical care?  · Get help right away by calling your local emergency services (911 in the [place] if you have:  ? Trouble breathing.  ? Pain or pressure in your chest.  ? Confusion.  ? Blue-tinged lips and  fingernails.  ? Difficulty waking from sleep.  ? Symptoms that get worse.  Let the emergency medical personnel know if you think you have coronavirus disease.  Summary  · A new respiratory virus is spreading from person to person and causing COVID-19 (coronavirus disease).  · The virus that causes COVID-19 appears to spread easily. It spreads from one person to another through droplets from coughing or sneezing.  · Older adults and those with chronic diseases are at higher risk of disease. If you are at higher risk for complications, take extra precautions.  · There is currently no vaccine to prevent coronavirus disease. There are no medicines, such as antibiotics or antivirals, to treat the virus.  · You can protect yourself and your family by washing your hands often, avoiding touching your face, and covering your coughs and sneezes.  This information is not intended to replace advice given to you by your health care provider. Make sure you discuss any questions you have with your health care provider.  Document Released: 04/14/2020 Document Revised: 04/14/2020 Document Reviewed: 04/14/2020  [employer] Patient Education © 2020 [employer]  y your health care provider. Make sure you discuss any questions you have with your health care provider.  Document Released: 04/14/2020 Document Revised: 05/13/2020 Document Reviewed: 04/14/2020  [employer] Patient Education © 2020 [employer]      Discharge Instructions    Discharged to home by car with relative. Discharged via wheelchair, hospital escort: Yes.  Special equipment needed: Not Applicable    Be sure to schedule a follow-up appointment with your primary care doctor or any specialists as instructed.     Discharge Plan:   Diet Plan: Discussed  Activity Level: Discussed  Confirmed Follow up Appointment: Patient to Call and Schedule Appointment  Confirmed Symptoms Management: Discussed  Medication Reconciliation Updated: Yes    I understand that a diet low in  cholesterol, fat, and sodium is recommended for good health. Unless I have been given specific instructions below for another diet, I accept this instruction as my diet prescription.   Other diet: Regular Diet    Special Instructions: None    · Is patient discharged on Warfarin / Coumadin?   No     Depression / Suicide Risk    As you are discharged from this [facility] facility, it is important to learn how to keep safe from harming yourself.    Recognize the warning signs:  · Abrupt changes in personality, positive or negative- including increase in energy   · Giving away possessions  · Change in eating patterns- significant weight changes-  positive or negative  · Change in sleeping patterns- unable to sleep or sleeping all the time   · Unwillingness or inability to communicate  · Depression  · Unusual sadness, discouragement and loneliness  · Talk of wanting to die  · Neglect of personal appearance   · Rebelliousness- reckless behavior  · Withdrawal from people/activities they love  · Confusion- inability to concentrate     If you or a loved one observes any of these behaviors or has concerns about self-harm, here's what you can do:  · Talk about it- your feelings and reasons for harming yourself  · Remove any means that you might use to hurt yourself (examples: pills, rope, extension cords, firearm)  · Get professional help from the community (Mental Health, Substance Abuse, psychological counseling)  · Do not be alone:Call your Safe Contact- someone whom you trust who will be there for you.  · Call your local CRISIS HOTLINE 392-0506 or 624-337-9963  · Call your local Children's Mobile Crisis Response Team [place] (949) 153-6744 or www.[facility]  · Call the toll free National Suicide Prevention Hotlines   · National Suicide Prevention Lifeline 365-285-SRIS (9671)  · National Hope Line Network 800-SUICIDE (103-1678)

## 2020-11-21 LAB
BACTERIA BLD CULT: NORMAL
BACTERIA BLD CULT: NORMAL
SIGNIFICANT IND 70042: NORMAL
SIGNIFICANT IND 70042: NORMAL
SITE SITE: NORMAL
SITE SITE: NORMAL
SOURCE SOURCE: NORMAL
SOURCE SOURCE: NORMAL

## 2021-05-05 NOTE — ED NOTES
PIV established, blood cultures, labs, and COVID swab sent to lab.    Fluconazole Counseling:  Patient counseled regarding adverse effects of fluconazole including but not limited to headache, diarrhea, nausea, upset stomach, liver function test abnormalities, taste disturbance, and stomach pain.  There is a rare possibility of liver failure that can occur when taking fluconazole.  The patient understands that monitoring of LFTs and kidney function test may be required, especially at baseline. The patient verbalized understanding of the proper use and possible adverse effects of fluconazole.  All of the patient's questions and concerns were addressed.

## 2022-09-26 ENCOUNTER — APPOINTMENT (OUTPATIENT)
Dept: RADIOLOGY | Facility: MEDICAL CENTER | Age: 87
End: 2022-09-26
Attending: EMERGENCY MEDICINE
Payer: MEDICARE

## 2022-09-26 ENCOUNTER — HOSPITAL ENCOUNTER (OUTPATIENT)
Facility: MEDICAL CENTER | Age: 87
End: 2022-10-20
Attending: EMERGENCY MEDICINE | Admitting: STUDENT IN AN ORGANIZED HEALTH CARE EDUCATION/TRAINING PROGRAM
Payer: MEDICARE

## 2022-09-26 DIAGNOSIS — N39.0 ACUTE UTI: Primary | ICD-10-CM

## 2022-09-26 DIAGNOSIS — E03.9 ACQUIRED HYPOTHYROIDISM: ICD-10-CM

## 2022-09-26 DIAGNOSIS — H04.123 DRY EYES: ICD-10-CM

## 2022-09-26 DIAGNOSIS — J45.20 MILD INTERMITTENT ASTHMA WITHOUT COMPLICATION: ICD-10-CM

## 2022-09-26 DIAGNOSIS — G44.029 CHRONIC CLUSTER HEADACHE, NOT INTRACTABLE: ICD-10-CM

## 2022-09-26 DIAGNOSIS — T78.40XA ALLERGY, INITIAL ENCOUNTER: ICD-10-CM

## 2022-09-26 DIAGNOSIS — R41.82 ALTERED MENTAL STATUS, UNSPECIFIED ALTERED MENTAL STATUS TYPE: ICD-10-CM

## 2022-09-26 LAB
ALBUMIN SERPL BCP-MCNC: 4.1 G/DL (ref 3.2–4.9)
ALBUMIN/GLOB SERPL: 1.7 G/DL
ALP SERPL-CCNC: 65 U/L (ref 30–99)
ALT SERPL-CCNC: 9 U/L (ref 2–50)
ANION GAP SERPL CALC-SCNC: 11 MMOL/L (ref 7–16)
AST SERPL-CCNC: 19 U/L (ref 12–45)
BASOPHILS # BLD AUTO: 0.8 % (ref 0–1.8)
BASOPHILS # BLD: 0.06 K/UL (ref 0–0.12)
BILIRUB SERPL-MCNC: 0.7 MG/DL (ref 0.1–1.5)
BUN SERPL-MCNC: 13 MG/DL (ref 8–22)
CALCIUM SERPL-MCNC: 8.8 MG/DL (ref 8.5–10.5)
CHLORIDE SERPL-SCNC: 102 MMOL/L (ref 96–112)
CO2 SERPL-SCNC: 24 MMOL/L (ref 20–33)
CREAT SERPL-MCNC: 0.68 MG/DL (ref 0.5–1.4)
EKG IMPRESSION: NORMAL
EOSINOPHIL # BLD AUTO: 0.07 K/UL (ref 0–0.51)
EOSINOPHIL NFR BLD: 0.9 % (ref 0–6.9)
ERYTHROCYTE [DISTWIDTH] IN BLOOD BY AUTOMATED COUNT: 49.1 FL (ref 35.9–50)
GFR SERPLBLD CREATININE-BSD FMLA CKD-EPI: 83 ML/MIN/1.73 M 2
GLOBULIN SER CALC-MCNC: 2.4 G/DL (ref 1.9–3.5)
GLUCOSE SERPL-MCNC: 94 MG/DL (ref 65–99)
HCT VFR BLD AUTO: 39.6 % (ref 37–47)
HGB BLD-MCNC: 13.3 G/DL (ref 12–16)
IMM GRANULOCYTES # BLD AUTO: 0.02 K/UL (ref 0–0.11)
IMM GRANULOCYTES NFR BLD AUTO: 0.3 % (ref 0–0.9)
LACTATE SERPL-SCNC: 1.1 MMOL/L (ref 0.5–2)
LYMPHOCYTES # BLD AUTO: 1.84 K/UL (ref 1–4.8)
LYMPHOCYTES NFR BLD: 24.6 % (ref 22–41)
MCH RBC QN AUTO: 31.7 PG (ref 27–33)
MCHC RBC AUTO-ENTMCNC: 33.6 G/DL (ref 33.6–35)
MCV RBC AUTO: 94.5 FL (ref 81.4–97.8)
MONOCYTES # BLD AUTO: 0.64 K/UL (ref 0–0.85)
MONOCYTES NFR BLD AUTO: 8.5 % (ref 0–13.4)
NEUTROPHILS # BLD AUTO: 4.86 K/UL (ref 2–7.15)
NEUTROPHILS NFR BLD: 64.9 % (ref 44–72)
NRBC # BLD AUTO: 0 K/UL
NRBC BLD-RTO: 0 /100 WBC
PLATELET # BLD AUTO: 233 K/UL (ref 164–446)
PMV BLD AUTO: 9.6 FL (ref 9–12.9)
POTASSIUM SERPL-SCNC: 3.5 MMOL/L (ref 3.6–5.5)
PROT SERPL-MCNC: 6.5 G/DL (ref 6–8.2)
RBC # BLD AUTO: 4.19 M/UL (ref 4.2–5.4)
SODIUM SERPL-SCNC: 137 MMOL/L (ref 135–145)
WBC # BLD AUTO: 7.5 K/UL (ref 4.8–10.8)

## 2022-09-26 PROCEDURE — 87040 BLOOD CULTURE FOR BACTERIA: CPT

## 2022-09-26 PROCEDURE — 85025 COMPLETE CBC W/AUTO DIFF WBC: CPT

## 2022-09-26 PROCEDURE — 83735 ASSAY OF MAGNESIUM: CPT

## 2022-09-26 PROCEDURE — 84439 ASSAY OF FREE THYROXINE: CPT

## 2022-09-26 PROCEDURE — 93005 ELECTROCARDIOGRAM TRACING: CPT | Performed by: EMERGENCY MEDICINE

## 2022-09-26 PROCEDURE — 87086 URINE CULTURE/COLONY COUNT: CPT

## 2022-09-26 PROCEDURE — 87150 DNA/RNA AMPLIFIED PROBE: CPT

## 2022-09-26 PROCEDURE — 83605 ASSAY OF LACTIC ACID: CPT

## 2022-09-26 PROCEDURE — 81001 URINALYSIS AUTO W/SCOPE: CPT

## 2022-09-26 PROCEDURE — 84100 ASSAY OF PHOSPHORUS: CPT

## 2022-09-26 PROCEDURE — 87186 SC STD MICRODIL/AGAR DIL: CPT

## 2022-09-26 PROCEDURE — 84443 ASSAY THYROID STIM HORMONE: CPT

## 2022-09-26 PROCEDURE — 80053 COMPREHEN METABOLIC PANEL: CPT

## 2022-09-26 PROCEDURE — 36415 COLL VENOUS BLD VENIPUNCTURE: CPT

## 2022-09-26 PROCEDURE — 71045 X-RAY EXAM CHEST 1 VIEW: CPT

## 2022-09-26 PROCEDURE — 99285 EMERGENCY DEPT VISIT HI MDM: CPT

## 2022-09-26 PROCEDURE — 87077 CULTURE AEROBIC IDENTIFY: CPT

## 2022-09-26 ASSESSMENT — FIBROSIS 4 INDEX: FIB4 SCORE: 3.41

## 2022-09-27 PROBLEM — N39.0 COMPLICATED UTI (URINARY TRACT INFECTION): Status: ACTIVE | Noted: 2022-09-27

## 2022-09-27 PROBLEM — G93.40 ENCEPHALOPATHY: Status: ACTIVE | Noted: 2022-09-27

## 2022-09-27 PROBLEM — E87.6 HYPOKALEMIA: Status: ACTIVE | Noted: 2022-09-27

## 2022-09-27 LAB
APPEARANCE UR: CLEAR
BACTERIA #/AREA URNS HPF: ABNORMAL /HPF
BILIRUB UR QL STRIP.AUTO: NEGATIVE
COLOR UR: YELLOW
EPI CELLS #/AREA URNS HPF: NEGATIVE /HPF
GLUCOSE UR STRIP.AUTO-MCNC: NEGATIVE MG/DL
HYALINE CASTS #/AREA URNS LPF: ABNORMAL /LPF
KETONES UR STRIP.AUTO-MCNC: NEGATIVE MG/DL
LEUKOCYTE ESTERASE UR QL STRIP.AUTO: ABNORMAL
MAGNESIUM SERPL-MCNC: 1.9 MG/DL (ref 1.5–2.5)
MICRO URNS: ABNORMAL
NITRITE UR QL STRIP.AUTO: POSITIVE
PH UR STRIP.AUTO: 5.5 [PH] (ref 5–8)
PHOSPHATE SERPL-MCNC: 3.4 MG/DL (ref 2.5–4.5)
PROT UR QL STRIP: NEGATIVE MG/DL
RBC # URNS HPF: ABNORMAL /HPF
RBC UR QL AUTO: NEGATIVE
SP GR UR STRIP.AUTO: 1.02
T4 FREE SERPL-MCNC: 0.59 NG/DL (ref 0.93–1.7)
TSH SERPL DL<=0.005 MIU/L-ACNC: 27.2 UIU/ML (ref 0.38–5.33)
UROBILINOGEN UR STRIP.AUTO-MCNC: 0.2 MG/DL
WBC #/AREA URNS HPF: ABNORMAL /HPF

## 2022-09-27 PROCEDURE — 96374 THER/PROPH/DIAG INJ IV PUSH: CPT

## 2022-09-27 PROCEDURE — 700111 HCHG RX REV CODE 636 W/ 250 OVERRIDE (IP): Performed by: EMERGENCY MEDICINE

## 2022-09-27 PROCEDURE — 96372 THER/PROPH/DIAG INJ SC/IM: CPT

## 2022-09-27 PROCEDURE — A9270 NON-COVERED ITEM OR SERVICE: HCPCS | Performed by: STUDENT IN AN ORGANIZED HEALTH CARE EDUCATION/TRAINING PROGRAM

## 2022-09-27 PROCEDURE — 96375 TX/PRO/DX INJ NEW DRUG ADDON: CPT

## 2022-09-27 PROCEDURE — 99220 PR INITIAL OBSERVATION CARE,LEVL III: CPT | Performed by: STUDENT IN AN ORGANIZED HEALTH CARE EDUCATION/TRAINING PROGRAM

## 2022-09-27 PROCEDURE — 700102 HCHG RX REV CODE 250 W/ 637 OVERRIDE(OP): Performed by: STUDENT IN AN ORGANIZED HEALTH CARE EDUCATION/TRAINING PROGRAM

## 2022-09-27 PROCEDURE — G0378 HOSPITAL OBSERVATION PER HR: HCPCS

## 2022-09-27 PROCEDURE — 700105 HCHG RX REV CODE 258: Performed by: STUDENT IN AN ORGANIZED HEALTH CARE EDUCATION/TRAINING PROGRAM

## 2022-09-27 PROCEDURE — 700111 HCHG RX REV CODE 636 W/ 250 OVERRIDE (IP): Performed by: STUDENT IN AN ORGANIZED HEALTH CARE EDUCATION/TRAINING PROGRAM

## 2022-09-27 RX ORDER — AMOXICILLIN 250 MG
2 CAPSULE ORAL 2 TIMES DAILY
Status: DISCONTINUED | OUTPATIENT
Start: 2022-09-27 | End: 2022-10-20 | Stop reason: HOSPADM

## 2022-09-27 RX ORDER — LABETALOL HYDROCHLORIDE 5 MG/ML
10 INJECTION, SOLUTION INTRAVENOUS EVERY 4 HOURS PRN
Status: DISCONTINUED | OUTPATIENT
Start: 2022-09-27 | End: 2022-10-10

## 2022-09-27 RX ORDER — ONDANSETRON 4 MG/1
4 TABLET, ORALLY DISINTEGRATING ORAL EVERY 4 HOURS PRN
Status: DISCONTINUED | OUTPATIENT
Start: 2022-09-27 | End: 2022-10-14

## 2022-09-27 RX ORDER — ENOXAPARIN SODIUM 100 MG/ML
40 INJECTION SUBCUTANEOUS DAILY
Status: DISCONTINUED | OUTPATIENT
Start: 2022-09-27 | End: 2022-10-02

## 2022-09-27 RX ORDER — ASPIRIN 325 MG
325 TABLET ORAL EVERY 6 HOURS PRN
Status: ON HOLD | COMMUNITY
End: 2022-10-14

## 2022-09-27 RX ORDER — POLYETHYLENE GLYCOL 3350 17 G/17G
1 POWDER, FOR SOLUTION ORAL
Status: DISCONTINUED | OUTPATIENT
Start: 2022-09-27 | End: 2022-10-20 | Stop reason: HOSPADM

## 2022-09-27 RX ORDER — ALBUTEROL SULFATE 90 UG/1
2 AEROSOL, METERED RESPIRATORY (INHALATION) EVERY 4 HOURS PRN
Status: DISCONTINUED | OUTPATIENT
Start: 2022-09-27 | End: 2022-09-28

## 2022-09-27 RX ORDER — IPRATROPIUM BROMIDE AND ALBUTEROL SULFATE 2.5; .5 MG/3ML; MG/3ML
3 SOLUTION RESPIRATORY (INHALATION)
Status: DISCONTINUED | OUTPATIENT
Start: 2022-09-27 | End: 2022-10-20 | Stop reason: HOSPADM

## 2022-09-27 RX ORDER — SODIUM CHLORIDE, SODIUM LACTATE, POTASSIUM CHLORIDE, CALCIUM CHLORIDE 600; 310; 30; 20 MG/100ML; MG/100ML; MG/100ML; MG/100ML
INJECTION, SOLUTION INTRAVENOUS CONTINUOUS
Status: ACTIVE | OUTPATIENT
Start: 2022-09-27 | End: 2022-09-27

## 2022-09-27 RX ORDER — ACETAMINOPHEN 325 MG/1
650 TABLET ORAL EVERY 6 HOURS PRN
Status: DISCONTINUED | OUTPATIENT
Start: 2022-09-27 | End: 2022-10-20 | Stop reason: HOSPADM

## 2022-09-27 RX ORDER — CEFTRIAXONE 1 G/1
1 INJECTION, POWDER, FOR SOLUTION INTRAMUSCULAR; INTRAVENOUS ONCE
Status: COMPLETED | OUTPATIENT
Start: 2022-09-27 | End: 2022-09-27

## 2022-09-27 RX ORDER — ONDANSETRON 2 MG/ML
4 INJECTION INTRAMUSCULAR; INTRAVENOUS EVERY 4 HOURS PRN
Status: DISCONTINUED | OUTPATIENT
Start: 2022-09-27 | End: 2022-10-14

## 2022-09-27 RX ORDER — BISACODYL 10 MG
10 SUPPOSITORY, RECTAL RECTAL
Status: DISCONTINUED | OUTPATIENT
Start: 2022-09-27 | End: 2022-10-20 | Stop reason: HOSPADM

## 2022-09-27 RX ORDER — POTASSIUM CHLORIDE 20 MEQ/1
40 TABLET, EXTENDED RELEASE ORAL ONCE
Status: COMPLETED | OUTPATIENT
Start: 2022-09-27 | End: 2022-09-27

## 2022-09-27 RX ORDER — LEVOTHYROXINE SODIUM 0.05 MG/1
50 TABLET ORAL
Status: DISCONTINUED | OUTPATIENT
Start: 2022-09-27 | End: 2022-10-20 | Stop reason: HOSPADM

## 2022-09-27 RX ADMIN — ENOXAPARIN SODIUM 40 MG: 40 INJECTION SUBCUTANEOUS at 17:56

## 2022-09-27 RX ADMIN — CEFTRIAXONE SODIUM 1 G: 1 INJECTION, POWDER, FOR SOLUTION INTRAMUSCULAR; INTRAVENOUS at 00:43

## 2022-09-27 RX ADMIN — ENOXAPARIN SODIUM 40 MG: 40 INJECTION SUBCUTANEOUS at 01:56

## 2022-09-27 RX ADMIN — ALBUTEROL SULFATE 2 PUFF: 90 AEROSOL, METERED RESPIRATORY (INHALATION) at 09:19

## 2022-09-27 RX ADMIN — POTASSIUM CHLORIDE 40 MEQ: 1500 TABLET, EXTENDED RELEASE ORAL at 01:57

## 2022-09-27 RX ADMIN — ALBUTEROL SULFATE 2 PUFF: 90 AEROSOL, METERED RESPIRATORY (INHALATION) at 22:12

## 2022-09-27 RX ADMIN — LEVOTHYROXINE SODIUM 50 MCG: 0.05 TABLET ORAL at 17:56

## 2022-09-27 ASSESSMENT — ENCOUNTER SYMPTOMS
FEVER: 0
ABDOMINAL PAIN: 0
SPEECH CHANGE: 0
SPUTUM PRODUCTION: 0
NAUSEA: 0
VOMITING: 0
BLURRED VISION: 0
INSOMNIA: 0
NECK PAIN: 0
CLAUDICATION: 0
DIZZINESS: 0
COUGH: 0
SENSORY CHANGE: 0
MYALGIAS: 0
SHORTNESS OF BREATH: 0
DIARRHEA: 0
FOCAL WEAKNESS: 0
NERVOUS/ANXIOUS: 0
DOUBLE VISION: 0
HEADACHES: 1
CHILLS: 0
PALPITATIONS: 0

## 2022-09-27 ASSESSMENT — COGNITIVE AND FUNCTIONAL STATUS - GENERAL
STANDING UP FROM CHAIR USING ARMS: A LITTLE
MOBILITY SCORE: 18
DRESSING REGULAR UPPER BODY CLOTHING: A LITTLE
MOVING TO AND FROM BED TO CHAIR: A LITTLE
TURNING FROM BACK TO SIDE WHILE IN FLAT BAD: A LITTLE
TURNING FROM BACK TO SIDE WHILE IN FLAT BAD: A LITTLE
SUGGESTED CMS G CODE MODIFIER DAILY ACTIVITY: CJ
STANDING UP FROM CHAIR USING ARMS: A LITTLE
TOILETING: A LITTLE
CLIMB 3 TO 5 STEPS WITH RAILING: A LOT
SUGGESTED CMS G CODE MODIFIER MOBILITY: CK
MOVING TO AND FROM BED TO CHAIR: A LITTLE
WALKING IN HOSPITAL ROOM: A LITTLE
MOVING FROM LYING ON BACK TO SITTING ON SIDE OF FLAT BED: A LITTLE
WALKING IN HOSPITAL ROOM: A LITTLE
HELP NEEDED FOR BATHING: A LITTLE
MOVING FROM LYING ON BACK TO SITTING ON SIDE OF FLAT BED: A LITTLE
PERSONAL GROOMING: A LITTLE
SUGGESTED CMS G CODE MODIFIER DAILY ACTIVITY: CK
SUGGESTED CMS G CODE MODIFIER MOBILITY: CK
CLIMB 3 TO 5 STEPS WITH RAILING: A LITTLE
HELP NEEDED FOR BATHING: A LITTLE
DAILY ACTIVITIY SCORE: 21
DRESSING REGULAR LOWER BODY CLOTHING: A LITTLE
DRESSING REGULAR UPPER BODY CLOTHING: A LITTLE
MOBILITY SCORE: 17
DAILY ACTIVITIY SCORE: 17
DRESSING REGULAR LOWER BODY CLOTHING: A LOT
EATING MEALS: A LITTLE

## 2022-09-27 ASSESSMENT — PATIENT HEALTH QUESTIONNAIRE - PHQ9
SUM OF ALL RESPONSES TO PHQ9 QUESTIONS 1 AND 2: 0
1. LITTLE INTEREST OR PLEASURE IN DOING THINGS: NOT AT ALL
2. FEELING DOWN, DEPRESSED, IRRITABLE, OR HOPELESS: NOT AT ALL

## 2022-09-27 ASSESSMENT — PAIN DESCRIPTION - PAIN TYPE
TYPE: ACUTE PAIN

## 2022-09-27 ASSESSMENT — LIFESTYLE VARIABLES: SUBSTANCE_ABUSE: 0

## 2022-09-27 NOTE — ED NOTES
Pt got herself unhooked and out of bed and is walking to bathroom. Assisted pt to the bathroom. Pt walking with steady gait.

## 2022-09-27 NOTE — ASSESSMENT & PLAN NOTE
As per urinalysis leukocyte esterase positive with nitrite positivity.    Last urine culture in our EMR from November 2020 pansensitive E. Coli, in 2015 had Klebsiella pneumonia culture resistant to ampicillin with intermediate resistance to nitrofurantoin and piperacillin.    Urine cultures are growing Klebsiella pneumonia.  Sensitivities and susceptibilities are pending.  Blood cultures 1/2 positive for coagulase negative staph.  Likely contaminant.    Continue cefazolin for total 3-day course of IV antibiotics until sensitivities are obtained  resolved

## 2022-09-27 NOTE — ASSESSMENT & PLAN NOTE
Appears that patient has not been taking her medications.  TSH level 27.2.  Free T4 is also low at 0.59.    TSH goal of 5-10 given patient's age group    Continue levothyroxine 50 mcg daily  Recheck TSH in 6 weeks

## 2022-09-27 NOTE — ED NOTES
Med rec completed per patient  Allergies reviewed  No PO Antibiotics in the last 30 days     Patient states she takes no RX medications, and only takes aspirin sometimes when shes hurting.

## 2022-09-27 NOTE — ED PROVIDER NOTES
ED Provider Note    Scribed for Dusty Escalante by Natalya Mathews. 9/26/2022  10:03 PM    Primary care provider: Pcp Pt States None  Means of arrival: EMS  History obtained from: Patient  History limited by: Non reliable historian    CHIEF COMPLAINT  Chief Complaint   Patient presents with    Other     Pt was found wandering [place].       HPI  Alyssa Funez is a 89 y.o. female who presents to the Emergency Department via EMS for altered mental status onset prior to arrival. EMS reports the patient was found wandering outside. The patient reports that she was not wandering, but had rented a room on [address] and was trying to get to that room. The patient states she is unsure of the year, but knows it is September. She believes the current president is Sushant Astrid. She denies symptoms of current pain, head trauma, cough, shortness of breath, dysuria, or diarrhea. The patient states that a few days ago she tripped and chipped her front tooth.     Quality: Confusion  Duration: 1 hour  Severity: Mild  Associated sx: Female    History limited secondary to patient being a non reliable historian.     REVIEW OF SYSTEMS  See HPI for further details.     History limited secondary to patient being a non reliable historian.     PAST MEDICAL HISTORY   has a past medical history of Asthma, Breath shortness, H/O: hysterectomy, MEDICAL HOME, Psychiatric disorder, and S/P lobectomy of lung (1968).    SURGICAL HISTORY   has a past surgical history that includes orif, fracture, tibia, plateau (12/3/2009); gyn surgery; other; ercp in or (11/29/2011); removal of lung,segmentectomy (1960); orif, femur (1993); orif, fracture, tibia (1993); and hysterectomy radical (1960).    SOCIAL HISTORY  Social History     Tobacco Use    Smoking status: Never    Smokeless tobacco: Never   Substance Use Topics    Alcohol use: No    Drug use: No      Social History     Substance and Sexual Activity   Drug Use No     FAMILY HISTORY  None  "noted.     CURRENT MEDICATIONS  Current Outpatient Medications   Medication Instructions    albuterol 108 (90 Base) MCG/ACT Aero Soln inhalation aerosol 2 Puffs, Inhalation, EVERY 6 HOURS PRN    albuterol 108 (90 Base) MCG/ACT Aero Soln inhalation aerosol 2 Puffs, Inhalation, EVERY 4 HOURS PRN    albuterol 108 (90 Base) MCG/ACT Aero Soln inhalation aerosol 2 Puffs, Inhalation, EVERY 4 HOURS PRN    benzonatate (TESSALON) 100 mg, Oral, 3 TIMES DAILY PRN    dexamethasone (DECADRON) 6 mg, Oral, DAILY     ALLERGIES  Allergies   Allergen Reactions    Avelox [Moxifloxacin Hcl In Nacl] Hives    Codeine Vomiting    Morphine Vomiting     Pt informed during admission interview that she gets \"terribly sick\" , violently nausea and vomiting. Present note for updating allergy status.    Cimetidine Rash    Prednisone Anaphylaxis     PHYSICAL EXAM    VITAL SIGNS:   Vitals:    09/26/22 2135 09/26/22 2138 09/26/22 2200 09/26/22 2300   BP:  115/64 109/56 106/60   Pulse:  65 62 (!) 53   Resp:  18 17 16   Temp:  36.9 °C (98.5 °F)     SpO2:  95% 91% 92%   Weight: 65.8 kg (145 lb)      Height: 1.524 m (5')        Vitals: My interpretation: normotensive, not tachycardic, afebrile, not hypoxic    Reinterpretation of vitals: Unchange    Cardiac Monitor Interpretation: The cardiac monitor revealed normal Sinus Rhythm as interpreted by me. The cardiac monitor was ordered secondary to the patient's history of altered mental status and to monitor for dysrhythmia and/or tachycardia.    PE:   Constitutional: Well developed, Well nourished, No acute distress, Non-toxic appearance.   HENT: Normocephalic, Atraumatic, Bilateral external ears normal, Oropharynx is clear mucous membranes are moist. No oral exudates or nasal discharge.   Eyes: Pupils are equal round and reactive, EOMI, Conjunctiva normal, No discharge.   Neck: Normal range of motion, No tenderness, Supple, No stridor. No meningismus.  Lymphatic: No lymphadenopathy noted.   Cardiovascular: " Regular rate and rhythm without murmur rub or gallop.  Thorax & Lungs: Clear breath sounds bilaterally without wheezes, rhonchi or rales. There is no chest wall tenderness.   Abdomen: Soft non-tender non-distended. There is no rebound or guarding. No organomegaly is appreciated. Bowel sounds are normal.  Skin: Normal without rash.   Back: No CVA or spinal tenderness.   Extremities: Intact distal pulses, No edema, No tenderness, No cyanosis, No clubbing. Capillary refill is less than 2 seconds.  Musculoskeletal: Good range of motion in all major joints. No tenderness to palpation or major deformities noted.   Neurologic: Alert & oriented x 2, Normal motor function, Normal sensory function, No focal deficits noted. Reflexes are normal.  Psychiatric: Affect normal, Judgment normal, Mood normal. There is no suicidal ideation or patient reported hallucinations.     DIAGNOSTIC STUDIES / PROCEDURES    LABS  Results for orders placed or performed during the hospital encounter of 09/26/22   LACTIC ACID   Result Value Ref Range    Lactic Acid 1.1 0.5 - 2.0 mmol/L   CBC WITH DIFFERENTIAL   Result Value Ref Range    WBC 7.5 4.8 - 10.8 K/uL    RBC 4.19 (L) 4.20 - 5.40 M/uL    Hemoglobin 13.3 12.0 - 16.0 g/dL    Hematocrit 39.6 37.0 - 47.0 %    MCV 94.5 81.4 - 97.8 fL    MCH 31.7 27.0 - 33.0 pg    MCHC 33.6 33.6 - 35.0 g/dL    RDW 49.1 35.9 - 50.0 fL    Platelet Count 233 164 - 446 K/uL    MPV 9.6 9.0 - 12.9 fL    Neutrophils-Polys 64.90 44.00 - 72.00 %    Lymphocytes 24.60 22.00 - 41.00 %    Monocytes 8.50 0.00 - 13.40 %    Eosinophils 0.90 0.00 - 6.90 %    Basophils 0.80 0.00 - 1.80 %    Immature Granulocytes 0.30 0.00 - 0.90 %    Nucleated RBC 0.00 /100 WBC    Neutrophils (Absolute) 4.86 2.00 - 7.15 K/uL    Lymphs (Absolute) 1.84 1.00 - 4.80 K/uL    Monos (Absolute) 0.64 0.00 - 0.85 K/uL    Eos (Absolute) 0.07 0.00 - 0.51 K/uL    Baso (Absolute) 0.06 0.00 - 0.12 K/uL    Immature Granulocytes (abs) 0.02 0.00 - 0.11 K/uL    NRBC  (Absolute) 0.00 K/uL   COMP METABOLIC PANEL   Result Value Ref Range    Sodium 137 135 - 145 mmol/L    Potassium 3.5 (L) 3.6 - 5.5 mmol/L    Chloride 102 96 - 112 mmol/L    Co2 24 20 - 33 mmol/L    Anion Gap 11.0 7.0 - 16.0    Glucose 94 65 - 99 mg/dL    Bun 13 8 - 22 mg/dL    Creatinine 0.68 0.50 - 1.40 mg/dL    Calcium 8.8 8.5 - 10.5 mg/dL    AST(SGOT) 19 12 - 45 U/L    ALT(SGPT) 9 2 - 50 U/L    Alkaline Phosphatase 65 30 - 99 U/L    Total Bilirubin 0.7 0.1 - 1.5 mg/dL    Albumin 4.1 3.2 - 4.9 g/dL    Total Protein 6.5 6.0 - 8.2 g/dL    Globulin 2.4 1.9 - 3.5 g/dL    A-G Ratio 1.7 g/dL   URINALYSIS    Specimen: Blood   Result Value Ref Range    Color Yellow     Character Clear     Specific Gravity 1.019 <1.035    Ph 5.5 5.0 - 8.0    Glucose Negative Negative mg/dL    Ketones Negative Negative mg/dL    Protein Negative Negative mg/dL    Bilirubin Negative Negative    Urobilinogen, Urine 0.2 Negative    Nitrite Positive (A) Negative    Leukocyte Esterase Small (A) Negative    Occult Blood Negative Negative    Micro Urine Req Microscopic    ESTIMATED GFR   Result Value Ref Range    GFR (CKD-EPI) 83 >60 mL/min/1.73 m 2   URINE MICROSCOPIC (W/UA)   Result Value Ref Range    WBC 5-10 (A) /hpf    RBC 0-2 /hpf    Bacteria Many (A) None /hpf    Epithelial Cells Negative /hpf    Hyaline Cast 3-5 (A) /lpf   EKG   Result Value Ref Range    Report       [facility] Emergency Dept.    Test Date:  2022  Pt Name:    BABS LAU             Department: ER  MRN:        [id number]                      Room:       BL 22  Gender:     Female                       Technician: [id number]  :        [id number]                   Requested By:NICK ANTONIO  Order #:    [id number]                    Reading MD: Nick Antonio    Measurements  Intervals                                Axis  Rate:       55                           P:          56  RI:         194                          QRS:        -66  QRSD:        108                          T:          36  QT:         474  QTc:        454    Interpretive Statements  Sinus bradycardia  Left anterior fascicular block  Abnormal R-wave progression, late transition  Borderline T wave abnormalities  Compared to ECG 06/04/2017 15:59:29  T-wave abnormality now present  Sinus rhythm no longer present  Incomplete right bundle-branch block no longer present  Right bundle-branch  block no longer present  Electronically Signed On 9- 23:03:11 PDT by Dusty Escalante        All labs reviewed by me. Significant for normal lactic acid, no leukocytosis, no anemia, normal electrolytes, normal renal function, normal liver enzymes, normal bilirubin, urine negative for ketones but does show signs of urinary tract infection    RADIOLOGY  DX-CHEST-PORTABLE (1 VIEW)   Final Result         1.  No acute cardiopulmonary disease.   2.  Atherosclerosis        The radiologist's interpretation of all radiological studies have been reviewed by me.    COURSE & MEDICAL DECISION MAKING  Nursing notes, VS, PMSFHx, labs, imaging, EKG reviewed in chart.    MDM: 10:05 PM Alyssa Funez is a 89 y.o. female who presented with acute encephalopathy.  Very little history is known.  Patient was found wandering the streets unsafely and brought in by EMS.  Patient does not know where she lives or where she stays and appears to be confused but in no acute distress.  Vital signs normal.  Physical exam is benign and she has a nonfocal nonlateralizing neurological exam.  She is oriented only to person and time and does show signs of confusion.  Her EKG is unremarkable.  Chest x-ray within normal limits.  CBC, CMP and lactic acid normal.  Urinalysis however does show obvious signs of urinary tract infection patient started on ceftriaxone.  Blood cultures drawn.  Patient does not meet criteria for sepsis at this time and is not hypotensive and does not require 30 cc/kg IV bolus of fluids.  Considering her acute  encephalopathy and urinary tract infection she will be admitted to the hospitalist for continued monitoring.  Discussed with social work and they are continue to try to contact family unsuccessfully as well as collateral information.  Patient is resting comfortably at time of admission and in no acute distress.    FINAL IMPRESSION  1. Acute UTI Acute   2. Altered mental status, unspecified altered mental status type Acute      I, Natalya Mathews (Scribchristopher), am scribing for, and in the presence of, Dusty Escalante.    Electronically signed by: Natalya Mathews (Jeffery), 9/26/2022    The note accurately reflects work and decisions made by me.  Dusty Escalante  9/27/2022  12:11 AM

## 2022-09-27 NOTE — DISCHARGE PLANNING
"    SW left  with Pt daughter Joann Leblanc [phone].     Chart Review show Pt to be living at [address] [place] [address] which is the [address]  [phone]. There is no answer at the  to verify if Pt lives at this address.     SW called [facility] to see if resident lived at their home. ANGEL spoke to Corazon who stated Pt has resided there in the past but she has \"timed out\" and can not live there again until January 2023. She stated Pt is Homeless but does have income so may have a hotel.    ERP has been updated.   "

## 2022-09-27 NOTE — PROGRESS NOTES
4 Eyes Skin Assessment Completed by KELVIN Donnelly and KELVIN Villalobos.    Head WDL  Ears WDL  Nose WDL  Mouth WDL  Neck Redness and Scar  Breast/Chest WDL  Shoulder Blades WDL  Spine WDL  (R) Arm/Elbow/Hand WDL  (L) Arm/Elbow/Hand WDL  Abdomen WDL  Groin WDL  Scrotum/Coccyx/Buttocks Redness  (R) Leg WDL  (L) Leg WDL  (R) Heel/Foot/Toe Scar and Scab  (L) Heel/Foot/Toe Scar and Scab          Devices In Places Blood Pressure Cuff      Interventions In Place N/A    Possible Skin Injury No    Pictures Uploaded Into Epic N/A  Wound Consult Placed N/A  RN Wound Prevention Protocol Ordered No

## 2022-09-27 NOTE — ASSESSMENT & PLAN NOTE
-With UTI and history of dementia recorded in chart, unclear baseline or living situation, / attempting to help find collateral information in ED unsuccessful, no answer with phone number provided for daughter in chart.  I have attempted to contact the patient's daughter as well as grandson phone numbers listed in epic.  There was no answer.  Unclear whether patient is encephalopathic versus this is a baseline for her confusion.  There is concern for the possibility of dementia.    -Limit sedatives, attempt to minimize risk of delirium such as avoiding day time napping and promote night time sleep, frequent reorientation, monitor for constipation, remove lines/tubing not needed, avoid early lab draws and vital checks, limit polypharmacy as able, and keep close to the window  Improved with treatment of cystitis    Appears to be new baseline appears that patient has a baseline dementia.  High risk for wandering.  She will continue to be evaluated for capacity.

## 2022-09-27 NOTE — CARE PLAN
The patient is Stable - Low risk of patient condition declining or worsening         Progress Note    Patient is Aox1 to self, moves all extremities. Neurologically intact. Vital signs stable. On room air, diminished lung sounds, bowel sounds present, voiding. Has no MD NADIA aware.     Other: Patient here for UTI

## 2022-09-27 NOTE — ED NOTES
Did straight cath. Pt tolerated well. Repositioned pt on stretcher. Lab at bedside to get additional blood work.

## 2022-09-27 NOTE — ED NOTES
Pt ambulatory to bathroom w/o assistance. Back in bed connected to VS, resting comfortably NAD w/ equal chest rise/fall.

## 2022-09-27 NOTE — DIETARY
Nutrition services: Day 0 of admit.  Alyssa Funez is a 89 y.o. female with admitting DX of Complicated UTI.  Consult received for Malnutrition Screening Tool score of 2 for unsure of unplanned weight loss.    Assessment:  Height: 152.4 cm (5')  Weight: 65.8 kg (145 lb)  Body mass index is 28.32 kg/m²., BMI classification: Overweight  Diet/Intake: Regular    Evaluation:   Pt with history of dementia. Unsure of unplanned weight loss per nutrition screen. No recent weight history in chart review to assess. Current BMI is 28.32.  Pt reported good PO intake per H&P. Per RN, pt ate 75% of breakfast today. Observed pt eating lunch at time of visit. Pt appears well nourished.    Malnutrition Risk: ASPEN Criteria not met.    Recommendations/Plan:   Encourage continued good intake of meals >50-75%.  Document intake of all meals as % taken in ADL's to provide interdisciplinary communication across all shifts.   Monitor weight.  Nutrition rep will continue to see patient for ongoing meal and snack preferences.   RD to monitor per department policy.
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
## 2022-09-27 NOTE — ED TRIAGE NOTES
Chief Complaint   Patient presents with    Other     Pt was found wandering [place].       Pt BIB [facility] for above complaint. Per report, pt is unable to remember her address. Pt potentially lives at [facility] across from [place] [address] on [address].  Pt is alert, but disoriented.  A&O X 2 (disoriented to time and situation).  Unknown if this her baseline.  Pt last seen at this facility on 11/16/2020.

## 2022-09-28 LAB
ANION GAP SERPL CALC-SCNC: 10 MMOL/L (ref 7–16)
BACTERIA BLD CULT: ABNORMAL
BACTERIA BLD CULT: ABNORMAL
BASOPHILS # BLD AUTO: 0.9 % (ref 0–1.8)
BASOPHILS # BLD: 0.06 K/UL (ref 0–0.12)
BUN SERPL-MCNC: 11 MG/DL (ref 8–22)
CALCIUM SERPL-MCNC: 8.7 MG/DL (ref 8.5–10.5)
CHLORIDE SERPL-SCNC: 104 MMOL/L (ref 96–112)
CO2 SERPL-SCNC: 25 MMOL/L (ref 20–33)
CREAT SERPL-MCNC: 0.72 MG/DL (ref 0.5–1.4)
EOSINOPHIL # BLD AUTO: 0.21 K/UL (ref 0–0.51)
EOSINOPHIL NFR BLD: 3.3 % (ref 0–6.9)
ERYTHROCYTE [DISTWIDTH] IN BLOOD BY AUTOMATED COUNT: 50.2 FL (ref 35.9–50)
GFR SERPLBLD CREATININE-BSD FMLA CKD-EPI: 80 ML/MIN/1.73 M 2
GLUCOSE SERPL-MCNC: 96 MG/DL (ref 65–99)
HCT VFR BLD AUTO: 41.3 % (ref 37–47)
HGB BLD-MCNC: 13.6 G/DL (ref 12–16)
IMM GRANULOCYTES # BLD AUTO: 0.02 K/UL (ref 0–0.11)
IMM GRANULOCYTES NFR BLD AUTO: 0.3 % (ref 0–0.9)
LYMPHOCYTES # BLD AUTO: 2.24 K/UL (ref 1–4.8)
LYMPHOCYTES NFR BLD: 35.3 % (ref 22–41)
MCH RBC QN AUTO: 31.5 PG (ref 27–33)
MCHC RBC AUTO-ENTMCNC: 32.9 G/DL (ref 33.6–35)
MCV RBC AUTO: 95.6 FL (ref 81.4–97.8)
MONOCYTES # BLD AUTO: 0.54 K/UL (ref 0–0.85)
MONOCYTES NFR BLD AUTO: 8.5 % (ref 0–13.4)
NEUTROPHILS # BLD AUTO: 3.27 K/UL (ref 2–7.15)
NEUTROPHILS NFR BLD: 51.7 % (ref 44–72)
NRBC # BLD AUTO: 0 K/UL
NRBC BLD-RTO: 0 /100 WBC
PLATELET # BLD AUTO: 267 K/UL (ref 164–446)
PMV BLD AUTO: 10 FL (ref 9–12.9)
POTASSIUM SERPL-SCNC: 4.1 MMOL/L (ref 3.6–5.5)
RBC # BLD AUTO: 4.32 M/UL (ref 4.2–5.4)
SIGNIFICANT IND 70042: ABNORMAL
SITE SITE: ABNORMAL
SODIUM SERPL-SCNC: 139 MMOL/L (ref 135–145)
SOURCE SOURCE: ABNORMAL
WBC # BLD AUTO: 6.3 K/UL (ref 4.8–10.8)

## 2022-09-28 PROCEDURE — 700111 HCHG RX REV CODE 636 W/ 250 OVERRIDE (IP): Performed by: STUDENT IN AN ORGANIZED HEALTH CARE EDUCATION/TRAINING PROGRAM

## 2022-09-28 PROCEDURE — A9270 NON-COVERED ITEM OR SERVICE: HCPCS | Performed by: STUDENT IN AN ORGANIZED HEALTH CARE EDUCATION/TRAINING PROGRAM

## 2022-09-28 PROCEDURE — 96372 THER/PROPH/DIAG INJ SC/IM: CPT

## 2022-09-28 PROCEDURE — G0378 HOSPITAL OBSERVATION PER HR: HCPCS

## 2022-09-28 PROCEDURE — 96365 THER/PROPH/DIAG IV INF INIT: CPT

## 2022-09-28 PROCEDURE — 99225 PR SUBSEQUENT OBSERVATION CARE,LEVEL II: CPT | Performed by: STUDENT IN AN ORGANIZED HEALTH CARE EDUCATION/TRAINING PROGRAM

## 2022-09-28 PROCEDURE — 80048 BASIC METABOLIC PNL TOTAL CA: CPT

## 2022-09-28 PROCEDURE — 36415 COLL VENOUS BLD VENIPUNCTURE: CPT

## 2022-09-28 PROCEDURE — 94640 AIRWAY INHALATION TREATMENT: CPT

## 2022-09-28 PROCEDURE — 700102 HCHG RX REV CODE 250 W/ 637 OVERRIDE(OP): Performed by: STUDENT IN AN ORGANIZED HEALTH CARE EDUCATION/TRAINING PROGRAM

## 2022-09-28 PROCEDURE — 85025 COMPLETE CBC W/AUTO DIFF WBC: CPT

## 2022-09-28 PROCEDURE — 700105 HCHG RX REV CODE 258: Performed by: STUDENT IN AN ORGANIZED HEALTH CARE EDUCATION/TRAINING PROGRAM

## 2022-09-28 PROCEDURE — 700101 HCHG RX REV CODE 250: Performed by: STUDENT IN AN ORGANIZED HEALTH CARE EDUCATION/TRAINING PROGRAM

## 2022-09-28 RX ORDER — ALBUTEROL SULFATE 90 UG/1
2 AEROSOL, METERED RESPIRATORY (INHALATION)
Status: DISCONTINUED | OUTPATIENT
Start: 2022-09-28 | End: 2022-10-20 | Stop reason: HOSPADM

## 2022-09-28 RX ADMIN — DOCUSATE SODIUM 50 MG AND SENNOSIDES 8.6 MG 2 TABLET: 8.6; 5 TABLET, FILM COATED ORAL at 17:35

## 2022-09-28 RX ADMIN — ALBUTEROL SULFATE 2 PUFF: 90 AEROSOL, METERED RESPIRATORY (INHALATION) at 20:32

## 2022-09-28 RX ADMIN — CEFAZOLIN 2 G: 2 INJECTION, POWDER, FOR SOLUTION INTRAMUSCULAR; INTRAVENOUS at 04:41

## 2022-09-28 RX ADMIN — ALBUTEROL SULFATE 2 PUFF: 90 AEROSOL, METERED RESPIRATORY (INHALATION) at 00:59

## 2022-09-28 RX ADMIN — CEFAZOLIN 2 G: 2 INJECTION, POWDER, FOR SOLUTION INTRAMUSCULAR; INTRAVENOUS at 13:52

## 2022-09-28 RX ADMIN — CEFAZOLIN 2 G: 2 INJECTION, POWDER, FOR SOLUTION INTRAMUSCULAR; INTRAVENOUS at 21:51

## 2022-09-28 RX ADMIN — LEVOTHYROXINE SODIUM 50 MCG: 0.05 TABLET ORAL at 04:42

## 2022-09-28 RX ADMIN — ACETAMINOPHEN 650 MG: 325 TABLET, FILM COATED ORAL at 01:00

## 2022-09-28 RX ADMIN — ACETAMINOPHEN 650 MG: 325 TABLET, FILM COATED ORAL at 17:48

## 2022-09-28 RX ADMIN — ENOXAPARIN SODIUM 40 MG: 40 INJECTION SUBCUTANEOUS at 17:35

## 2022-09-28 RX ADMIN — IPRATROPIUM BROMIDE AND ALBUTEROL SULFATE 3 ML: 2.5; .5 SOLUTION RESPIRATORY (INHALATION) at 10:52

## 2022-09-28 RX ADMIN — DOCUSATE SODIUM 50 MG AND SENNOSIDES 8.6 MG 2 TABLET: 8.6; 5 TABLET, FILM COATED ORAL at 04:42

## 2022-09-28 ASSESSMENT — ENCOUNTER SYMPTOMS
INSOMNIA: 0
NERVOUS/ANXIOUS: 0
ABDOMINAL PAIN: 0
HEADACHES: 1
VOMITING: 0
DIZZINESS: 0
NAUSEA: 0
PALPITATIONS: 0
SHORTNESS OF BREATH: 0
DIARRHEA: 0
COUGH: 0
MYALGIAS: 0

## 2022-09-28 ASSESSMENT — PAIN DESCRIPTION - PAIN TYPE: TYPE: ACUTE PAIN

## 2022-09-28 ASSESSMENT — FIBROSIS 4 INDEX: FIB4 SCORE: 2.111111111111111111
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
## 2022-09-28 NOTE — THERAPY
OT order cancelled as patient at / near baseline for ADLs and mobility. No OT needs.  
PT Consult    Patient Name: Alyssa Funez  Age:  89 y.o., Sex:  female  Medical Record #: [id number]  Today's Date: 9/27/2022      Alyssa Funez is a 89 y.o. female with history of COPD, hypothyroidism, dementia, benign obstructive jaundice, status post lobectomy who presented 9/26/2022 brought in by EMS wandering downtown at night reportedly confused. Patient reports she was walking around downtown when EMS approached her and said they were concerned for her safety and that they were going to bring her in to the hospital.      09/27/22 1621   Interdisciplinary Plan of Care Collaboration   Collaboration Comments Spoke with RN regarding current level of function. Per chart, pt has been up ambulating w/out assistance and has a steady gait. Confirmed with RN. RN endorses pt has been able to get out of bed and ambulate to/from bathroom w/out any difficulties or unsteadiness (only has cog impairments). No acute PT services indicated at this time, will discontinue order.     
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

## 2022-09-28 NOTE — CARE PLAN
The patient is Stable - Low risk of patient condition declining or worsening    Shift Goals  Clinical Goals: Safety, rest, OT Eval  Patient Goals: Ret, comfort  Family Goals: n/a      Problem: Knowledge Deficit - Standard  Goal: Patient and family/care givers will demonstrate understanding of plan of care, disease process/condition, diagnostic tests and medications  Outcome: Progressing     Problem: Fall Risk  Goal: Patient will remain free from falls  Outcome: Progressing       Progress made toward(s) clinical / shift goals:  Patient is alert and oriented  3. Not oriented to time. No complaints of pain. Inserted G. 20 on her right hand and tolerated well, SL. SOB noted, medication given. See MAR. Bed in lowest position, bed alarm, bed locked, call light and personal items within reached. Kept safe and monitored.     Patient is not progressing towards the following goals:
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
## 2022-09-28 NOTE — PROGRESS NOTES
Received a call from microbiology Dept. 1 set of aerobic and anaerobic bottle for blood culture are positive. Gram (+ )cocci in cluster. (- )Staph aureus, (-) Mrsa.

## 2022-09-28 NOTE — CARE PLAN
The patient is Stable - Low risk of patient condition declining or worsening    Shift Goals  Clinical Goals: safety  Patient Goals: rest    Progress made toward(s) clinical / shift goals:        Problem: Knowledge Deficit - Standard  Goal: Patient and family/care givers will demonstrate understanding of plan of care, disease process/condition, diagnostic tests and medications  Outcome: Progressing     Problem: Fall Risk  Goal: Patient will remain free from falls  Outcome: Progressing       Patient is not progressing towards the following goals:

## 2022-09-28 NOTE — PROGRESS NOTES
Patient is alert and oriented  3. Not oriented to time. No complaints of pain. Inserted G. 20 on her right hand and tolerated well, SL. SOB noted, medication given. See MAR. Bed in lowest position, bed alarm, bed locked, call light and personal items within reached. Kept safe and monitored.    28-Feb-2021

## 2022-09-28 NOTE — PROGRESS NOTES
Assumed pt care at 0700 . Pt is A&Ox 2 . Pt denies pain.  Pt's belongings are nearby, bed is in the lowest position and locked.

## 2022-09-28 NOTE — CARE PLAN
The patient is Stable - Low risk of patient condition declining or worsening    Shift Goals  Clinical Goals: IV abx  Patient Goals: comfort  Family Goals: n/a    Progress made toward(s) clinical / shift goals:    Problem: Knowledge Deficit - Standard  Goal: Patient and family/care givers will demonstrate understanding of plan of care, disease process/condition, diagnostic tests and medications  Outcome: Progressing     Problem: Fall Risk  Goal: Patient will remain free from falls  Outcome: Progressing       Patient is not progressing towards the following goals:

## 2022-09-28 NOTE — ASSESSMENT & PLAN NOTE
As per history.  Patient has a history of bronchiectasis status post partial lobectomy.    Continue to monitor closely  Continue albuterol as needed

## 2022-09-29 LAB
BACTERIA UR CULT: ABNORMAL
BACTERIA UR CULT: ABNORMAL
SIGNIFICANT IND 70042: ABNORMAL
SITE SITE: ABNORMAL
SOURCE SOURCE: ABNORMAL

## 2022-09-29 PROCEDURE — G0378 HOSPITAL OBSERVATION PER HR: HCPCS

## 2022-09-29 PROCEDURE — 700102 HCHG RX REV CODE 250 W/ 637 OVERRIDE(OP): Performed by: STUDENT IN AN ORGANIZED HEALTH CARE EDUCATION/TRAINING PROGRAM

## 2022-09-29 PROCEDURE — A9270 NON-COVERED ITEM OR SERVICE: HCPCS | Performed by: STUDENT IN AN ORGANIZED HEALTH CARE EDUCATION/TRAINING PROGRAM

## 2022-09-29 PROCEDURE — 700111 HCHG RX REV CODE 636 W/ 250 OVERRIDE (IP): Performed by: STUDENT IN AN ORGANIZED HEALTH CARE EDUCATION/TRAINING PROGRAM

## 2022-09-29 PROCEDURE — 96366 THER/PROPH/DIAG IV INF ADDON: CPT

## 2022-09-29 PROCEDURE — 700105 HCHG RX REV CODE 258: Performed by: STUDENT IN AN ORGANIZED HEALTH CARE EDUCATION/TRAINING PROGRAM

## 2022-09-29 PROCEDURE — 96372 THER/PROPH/DIAG INJ SC/IM: CPT

## 2022-09-29 RX ADMIN — CEFAZOLIN 2 G: 2 INJECTION, POWDER, FOR SOLUTION INTRAMUSCULAR; INTRAVENOUS at 13:55

## 2022-09-29 RX ADMIN — CEFAZOLIN 2 G: 2 INJECTION, POWDER, FOR SOLUTION INTRAMUSCULAR; INTRAVENOUS at 21:25

## 2022-09-29 RX ADMIN — CEFAZOLIN 2 G: 2 INJECTION, POWDER, FOR SOLUTION INTRAMUSCULAR; INTRAVENOUS at 07:30

## 2022-09-29 RX ADMIN — ALBUTEROL SULFATE 2 PUFF: 90 AEROSOL, METERED RESPIRATORY (INHALATION) at 23:15

## 2022-09-29 RX ADMIN — DOCUSATE SODIUM 50 MG AND SENNOSIDES 8.6 MG 2 TABLET: 8.6; 5 TABLET, FILM COATED ORAL at 17:15

## 2022-09-29 RX ADMIN — ENOXAPARIN SODIUM 40 MG: 40 INJECTION SUBCUTANEOUS at 17:14

## 2022-09-29 ASSESSMENT — PAIN DESCRIPTION - PAIN TYPE: TYPE: ACUTE PAIN

## 2022-09-29 NOTE — DISCHARGE PLANNING
" supervisor reviewed chart.     Emergency contact is daughter, Joann Leblanc [phone]: no answer, no ability to leave message.     Second emergency contact listed as: Davon Funez [phone]: \"Denita Segal\" answered. Reported they have no one by this name.     ANGEL completed NOK search:      Possible Relatives    Davon Funez 1st Degree   Age:    [phone]   [email or web address]   [address] [place] [address]    Andria Jensen 1st Degree : LEFT MESSAGE  Age: 68   [phone]   None found   [address]  [place] [address]    Ania De Oliveira 2nd Degree    Age: 89   None found   [email or web address]   [address]    Gisella Jensen 2nd Degree    Age:    [phone]   [email or web address]   [address] [place] [address]    Jalil Jensen 2nd Degree    Age: 51   [phone]: DISCONNECTED NUMBER   None found   [address]  [place] [address]    NO NOK FOUND  "

## 2022-09-29 NOTE — ASSESSMENT & PLAN NOTE
Concern for worsening dementia with increased wondering.  Patient's daughter as well as grandson stepping down.  Daughter was contacted on 10/17/2022 to give updates about going to group home.  She does not want to be her decision-maker.      Plan to discharge to [facility] group home with [facility] once QuantiFERON is back.  Need to get COVID-19 testing 24 hours before discharge

## 2022-09-29 NOTE — CARE PLAN
The patient is Stable - Low risk of patient condition declining or worsening    Shift Goals  Clinical Goals: Safety, IV abx therapy, cooperate with care  Patient Goals: rest, sleeps well  Family Goals: n/a      Problem: Knowledge Deficit - Standard  Goal: Patient and family/care givers will demonstrate understanding of plan of care, disease process/condition, diagnostic tests and medications  Outcome: Progressing     Problem: Fall Risk  Goal: Patient will remain free from falls  Outcome: Progressing     Problem: Pain - Standard  Goal: Alleviation of pain or a reduction in pain to the patient’s comfort goal  Outcome: Progressing       Progress made toward(s) clinical / shift goals:  Patient IV was infiltrated. RN removed it and will try to put a new IV in. However, patient refused saying that this RN is not capable of doing it. Reorientation provided that this RN put her IV last night with 1 try. But patient still refused. Charge RN made aware. IV antibiotic is pending.     Patient is not progressing towards the following goals:

## 2022-09-29 NOTE — PROGRESS NOTES
Patient IV was infiltrated. RN removed it and will try to put a new IV in. However, patient refused saying that this RN is not capable of doing it. Reorientation provided that this RN put her IV last night with 1 try. But patient still refused. Charge RN made aware. IV antibiotic is pending.

## 2022-09-29 NOTE — CARE PLAN
The patient is Watcher - Medium risk of patient condition declining or worsening    Shift Goals  Clinical Goals: Safety, IV abx therapy, cooperate with care  Patient Goals: rest, sleeps well  Family Goals: n/a    Progress made toward(s) clinical / shift goals:  yes    Patient is not progressing towards the following goals:      Problem: Knowledge Deficit - Standard  Goal: Patient and family/care givers will demonstrate understanding of plan of care, disease process/condition, diagnostic tests and medications  Outcome: Progressing     Problem: Fall Risk  Goal: Patient will remain free from falls  Outcome: Progressing     Problem: Pain - Standard  Goal: Alleviation of pain or a reduction in pain to the patient’s comfort goal  Outcome: Progressing
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
## 2022-09-30 PROCEDURE — 700102 HCHG RX REV CODE 250 W/ 637 OVERRIDE(OP): Performed by: STUDENT IN AN ORGANIZED HEALTH CARE EDUCATION/TRAINING PROGRAM

## 2022-09-30 PROCEDURE — 700101 HCHG RX REV CODE 250: Performed by: STUDENT IN AN ORGANIZED HEALTH CARE EDUCATION/TRAINING PROGRAM

## 2022-09-30 PROCEDURE — G0378 HOSPITAL OBSERVATION PER HR: HCPCS

## 2022-09-30 PROCEDURE — 96372 THER/PROPH/DIAG INJ SC/IM: CPT

## 2022-09-30 PROCEDURE — A9270 NON-COVERED ITEM OR SERVICE: HCPCS | Performed by: STUDENT IN AN ORGANIZED HEALTH CARE EDUCATION/TRAINING PROGRAM

## 2022-09-30 PROCEDURE — 94640 AIRWAY INHALATION TREATMENT: CPT

## 2022-09-30 PROCEDURE — 94760 N-INVAS EAR/PLS OXIMETRY 1: CPT

## 2022-09-30 PROCEDURE — 700111 HCHG RX REV CODE 636 W/ 250 OVERRIDE (IP): Performed by: STUDENT IN AN ORGANIZED HEALTH CARE EDUCATION/TRAINING PROGRAM

## 2022-09-30 RX ADMIN — IPRATROPIUM BROMIDE AND ALBUTEROL SULFATE 3 ML: 2.5; .5 SOLUTION RESPIRATORY (INHALATION) at 19:33

## 2022-09-30 RX ADMIN — ALBUTEROL SULFATE 2 PUFF: 90 AEROSOL, METERED RESPIRATORY (INHALATION) at 19:39

## 2022-09-30 RX ADMIN — ENOXAPARIN SODIUM 40 MG: 40 INJECTION SUBCUTANEOUS at 16:45

## 2022-09-30 RX ADMIN — LEVOTHYROXINE SODIUM 50 MCG: 0.05 TABLET ORAL at 05:47

## 2022-09-30 RX ADMIN — DOCUSATE SODIUM 50 MG AND SENNOSIDES 8.6 MG 2 TABLET: 8.6; 5 TABLET, FILM COATED ORAL at 05:47

## 2022-09-30 RX ADMIN — ACETAMINOPHEN 650 MG: 325 TABLET, FILM COATED ORAL at 16:44

## 2022-09-30 RX ADMIN — DOCUSATE SODIUM 50 MG AND SENNOSIDES 8.6 MG 2 TABLET: 8.6; 5 TABLET, FILM COATED ORAL at 16:44

## 2022-09-30 ASSESSMENT — PAIN DESCRIPTION - PAIN TYPE
TYPE: ACUTE PAIN

## 2022-09-30 NOTE — CARE PLAN
The patient is Stable - Low risk of patient condition declining or worsening    Shift Goals  Clinical Goals: Saftey and ambulation  Patient Goals: rest  Family Goals: N/A    Progress made toward(s) clinical / shift goals:    Problem: Knowledge Deficit - Standard  Goal: Patient and family/care givers will demonstrate understanding of plan of care, disease process/condition, diagnostic tests and medications  Note: Edcated pt on POC, monitor VS/labs, IV antbx, mobility, safety, all questions answered, needs reinforcement     Problem: Pain - Standard  Goal: Alleviation of pain or a reduction in pain to the patient’s comfort goal  Note: Denies pain at this time, no s/s of distress       Patient is not progressing towards the following goals:

## 2022-09-30 NOTE — PROGRESS NOTES
Patient is alert and oriented x 2. Not oriented to time and situation. Impulsive and doesn't call when in need to go to the toilet. Bed alarm in placed. IV patent and used for IV antibiotics. Call light and personal items within reached. Kept safe and monitored.

## 2022-09-30 NOTE — CARE PLAN
The patient is Stable - Low risk of patient condition declining or worsening    Shift Goals  Clinical Goals: Safety, Cont IV abx, cooperative with care  Patient Goals: Rest, comfort  Family Goals: n/a      Problem: Knowledge Deficit - Standard  Goal: Patient and family/care givers will demonstrate understanding of plan of care, disease process/condition, diagnostic tests and medications  Outcome: Progressing     Problem: Fall Risk  Goal: Patient will remain free from falls  Outcome: Progressing     Problem: Pain - Standard  Goal: Alleviation of pain or a reduction in pain to the patient’s comfort goal  Outcome: Progressing       Progress made toward(s) clinical / shift goals:  Patient is alert and oriented x 2. Not oriented to time and situation. Impulsive and doesn't call when in need to go to the toilet. Bed alarm in placed. IV patent and used for IV antibiotics. Call light and personal items within reached. Kept safe and monitored.     Patient is not progressing towards the following goals:

## 2022-09-30 NOTE — PROGRESS NOTES
Report received. Assumed care. Pt in bed awake. A/O x2. Denies pain, SOB. Assessment complete. Discussed POC, , pt verbalizes understanding. Explained importance of calling before getting OOB. Call light and belongings within reach. Bed alarm on. Bed in the lowest position. Treaded socks in place. Hourly rounding in progress. Will continue to monitor .

## 2022-10-01 PROCEDURE — A9270 NON-COVERED ITEM OR SERVICE: HCPCS | Performed by: STUDENT IN AN ORGANIZED HEALTH CARE EDUCATION/TRAINING PROGRAM

## 2022-10-01 PROCEDURE — 94640 AIRWAY INHALATION TREATMENT: CPT

## 2022-10-01 PROCEDURE — 700102 HCHG RX REV CODE 250 W/ 637 OVERRIDE(OP): Performed by: STUDENT IN AN ORGANIZED HEALTH CARE EDUCATION/TRAINING PROGRAM

## 2022-10-01 PROCEDURE — 700111 HCHG RX REV CODE 636 W/ 250 OVERRIDE (IP): Performed by: STUDENT IN AN ORGANIZED HEALTH CARE EDUCATION/TRAINING PROGRAM

## 2022-10-01 PROCEDURE — 96372 THER/PROPH/DIAG INJ SC/IM: CPT

## 2022-10-01 PROCEDURE — G0378 HOSPITAL OBSERVATION PER HR: HCPCS

## 2022-10-01 RX ADMIN — ALBUTEROL SULFATE 2 PUFF: 90 AEROSOL, METERED RESPIRATORY (INHALATION) at 21:23

## 2022-10-01 RX ADMIN — DOCUSATE SODIUM 50 MG AND SENNOSIDES 8.6 MG 2 TABLET: 8.6; 5 TABLET, FILM COATED ORAL at 04:21

## 2022-10-01 RX ADMIN — ALBUTEROL SULFATE 2 PUFF: 90 AEROSOL, METERED RESPIRATORY (INHALATION) at 11:08

## 2022-10-01 RX ADMIN — DOCUSATE SODIUM 50 MG AND SENNOSIDES 8.6 MG 2 TABLET: 8.6; 5 TABLET, FILM COATED ORAL at 17:49

## 2022-10-01 RX ADMIN — ENOXAPARIN SODIUM 40 MG: 40 INJECTION SUBCUTANEOUS at 17:49

## 2022-10-01 RX ADMIN — LEVOTHYROXINE SODIUM 50 MCG: 0.05 TABLET ORAL at 04:22

## 2022-10-01 RX ADMIN — ALBUTEROL SULFATE 2 PUFF: 90 AEROSOL, METERED RESPIRATORY (INHALATION) at 04:21

## 2022-10-01 RX ADMIN — ALBUTEROL SULFATE 2 PUFF: 90 AEROSOL, METERED RESPIRATORY (INHALATION) at 00:35

## 2022-10-01 RX ADMIN — ALBUTEROL SULFATE 2 PUFF: 90 AEROSOL, METERED RESPIRATORY (INHALATION) at 17:49

## 2022-10-01 ASSESSMENT — FIBROSIS 4 INDEX: FIB4 SCORE: 2.111111111111111111

## 2022-10-01 ASSESSMENT — PAIN DESCRIPTION - PAIN TYPE: TYPE: ACUTE PAIN

## 2022-10-01 NOTE — CARE PLAN
The patient is Stable - Low risk of patient condition declining or worsening    Shift Goals  Clinical Goals: Rest  Patient Goals: Rest  Family Goals: N/A      Problem: Knowledge Deficit - Standard  Goal: Patient and family/care givers will demonstrate understanding of plan of care, disease process/condition, diagnostic tests and medications  Outcome: Progressing     Problem: Fall Risk  Goal: Patient will remain free from falls  Outcome: Progressing     Problem: Pain - Standard  Goal: Alleviation of pain or a reduction in pain to the patient’s comfort goal  Outcome: Progressing       Progress made toward(s) clinical / shift goals:  Patient updated on the plan of care and reoriented to time and situation. Patient demonstrates proper use of the call light at times. Fall precautions in place and education provided. Hourly rounding. Pain assessed and patient medicated per MAR.

## 2022-10-01 NOTE — CARE PLAN
Problem: Knowledge Deficit - Standard  Goal: Patient and family/care givers will demonstrate understanding of plan of care, disease process/condition, diagnostic tests and medications  Outcome: Progressing     Problem: Fall Risk  Goal: Patient will remain free from falls  Outcome: Progressing     Problem: Pain - Standard  Goal: Alleviation of pain or a reduction in pain to the patient’s comfort goal  Outcome: Progressing   The patient is Stable - Low risk of patient condition declining or worsening    Shift Goals  Clinical Goals: placement  Patient Goals: rest  Family Goals: N/A    Progress made toward(s) clinical / shift goals:  patient updated on POC    Patient is not progressing towards the following goals:

## 2022-10-01 NOTE — PROGRESS NOTES
Assessment completed. Pt A&Ox 2 oriented to name and birth date. Respirations are even and unlabored on room air. Pt denies pain at this time. Medical patient, VS stable, call light and belongings within reach. POC updated (Placement). Pt educated on room and call light, pt verbalized understanding. Communication board updated. Needs met.

## 2022-10-01 NOTE — DISCHARGE PLANNING
Case Management Discharge Planning    Admission Date: 9/26/2022  GMLOS:    ALOS: 0    6-Clicks ADL Score: 17  6-Clicks Mobility Score: 17  PT and/or OT Eval ordered: YES  Post-acute Referrals Ordered: NA  Post-acute Choice Obtained: NA  Has referral(s) been sent to post-acute provider:  NA      Anticipated Discharge Dispo: Placement [facility] vs Long term care SNF    DME Needed: No    Action(s) Taken: CM met with Patient @ bedside & introduced self. CM discussed the CM role r/t the CM screening. Patient is a[age] y/o female; alert & oriented only to person. She is unaware what is the situation & is unable to recall her address. Patient was brought into the hospital for altered mental status & has been ambulating up/ down the streets. Next of Kin search has been completed & a message was left; no response at this time. Attending physician also give the next of kin search list to attempt gain for a response. CM will continue ro follow.    Escalations Completed: Social Work    Medically Clear: No    Next Steps: Care Coordinator team pending medical course    Barriers to Discharge: Medical clearance, Pending Placement, No Social Support, and Homelessness

## 2022-10-02 LAB
BACTERIA BLD CULT: NORMAL
SIGNIFICANT IND 70042: NORMAL
SITE SITE: NORMAL
SOURCE SOURCE: NORMAL

## 2022-10-02 PROCEDURE — 700102 HCHG RX REV CODE 250 W/ 637 OVERRIDE(OP): Performed by: STUDENT IN AN ORGANIZED HEALTH CARE EDUCATION/TRAINING PROGRAM

## 2022-10-02 PROCEDURE — 700102 HCHG RX REV CODE 250 W/ 637 OVERRIDE(OP): Performed by: NURSE PRACTITIONER

## 2022-10-02 PROCEDURE — 99225 PR SUBSEQUENT OBSERVATION CARE,LEVEL II: CPT | Performed by: NURSE PRACTITIONER

## 2022-10-02 PROCEDURE — A9270 NON-COVERED ITEM OR SERVICE: HCPCS | Performed by: NURSE PRACTITIONER

## 2022-10-02 PROCEDURE — A9270 NON-COVERED ITEM OR SERVICE: HCPCS | Performed by: STUDENT IN AN ORGANIZED HEALTH CARE EDUCATION/TRAINING PROGRAM

## 2022-10-02 PROCEDURE — G0378 HOSPITAL OBSERVATION PER HR: HCPCS

## 2022-10-02 RX ADMIN — DOCUSATE SODIUM 50 MG AND SENNOSIDES 8.6 MG 2 TABLET: 8.6; 5 TABLET, FILM COATED ORAL at 18:31

## 2022-10-02 RX ADMIN — RIVAROXABAN 10 MG: 10 TABLET, FILM COATED ORAL at 18:31

## 2022-10-02 RX ADMIN — ALBUTEROL SULFATE 2 PUFF: 90 AEROSOL, METERED RESPIRATORY (INHALATION) at 20:15

## 2022-10-02 ASSESSMENT — PAIN DESCRIPTION - PAIN TYPE: TYPE: ACUTE PAIN

## 2022-10-02 ASSESSMENT — ENCOUNTER SYMPTOMS
ABDOMINAL PAIN: 0
DIARRHEA: 0
PALPITATIONS: 0
COUGH: 0
NAUSEA: 0
VOMITING: 0
SHORTNESS OF BREATH: 0
INSOMNIA: 0
HEADACHES: 1
DIZZINESS: 0
NERVOUS/ANXIOUS: 0
MYALGIAS: 0

## 2022-10-02 NOTE — CARE PLAN
The patient is Stable - Low risk of patient condition declining or worsening    Shift Goals  Clinical Goals: Rest  Patient Goals: Rest  Family Goals: N/A    Care plan completed.    Progress made toward(s) clinical / shift goals:  Patient re-oriented and education on falls reinforced. Patient updated on the plan of care. Hourly rounding provided.
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
## 2022-10-02 NOTE — PROGRESS NOTES
Assumed care of patient at 0700. Patient is A&O x2, states pain level is 0/10. Bed locked in lowest position with 2 rail up. Call light  in place, belongings at bedside. Hourly rounding is in place.

## 2022-10-02 NOTE — PROGRESS NOTES
Assumed care of patient at 0700 from Dinesh. Patient is A&O x3, states pain level is 0/10. Bed locked in lowest position with 2 rail up. Call light  in place, belongings at bedside. Hourly rounding is in place.

## 2022-10-02 NOTE — DISCHARGE INSTRUCTIONS
Wound Care, Adult  Taking care of your wound properly can help to prevent pain, infection, and scarring. It can also help your wound to heal more quickly.  How to care for your wound  Wound care         Follow instructions from your health care provider about how to take care of your wound. Make sure you:  Wash your hands with soap and water before you change the bandage (dressing). If soap and water are not available, use hand .  Change your dressing as told by your health care provider.  Leave stitches (sutures), skin glue, or adhesive strips in place. These skin closures may need to stay in place for 2 weeks or longer. If adhesive strip edges start to loosen and curl up, you may trim the loose edges. Do not remove adhesive strips completely unless your health care provider tells you to do that.  Check your wound area every day for signs of infection. Check for:  Redness, swelling, or pain.  Fluid or blood.  Warmth.  Pus or a bad smell.  Ask your health care provider if you should clean the wound with mild soap and water. Doing this may include:  Using a clean towel to pat the wound dry after cleaning it. Do not rub or scrub the wound.  Applying a cream or ointment. Do this only as told by your health care provider.  Covering the incision with a clean dressing.  Ask your health care provider when you can leave the wound uncovered.  Keep the dressing dry until your health care provider says it can be removed. Do not take baths, swim, use a hot tub, or do anything that would put the wound underwater until your health care provider approves. Ask your health care provider if you can take showers. You may only be allowed to take sponge baths.  Medicines    If you were prescribed an antibiotic medicine, cream, or ointment, take or use the antibiotic as told by your health care provider. Do not stop taking or using the antibiotic even if your condition improves.  Take over-the-counter and prescription medicines  only as told by your health care provider. If you were prescribed pain medicine, take it 30 or more minutes before you do any wound care or as told by your health care provider.  General instructions  Return to your normal activities as told by your health care provider. Ask your health care provider what activities are safe.  Do not scratch or pick at the wound.  Do not use any products that contain nicotine or tobacco, such as cigarettes and e-cigarettes. These may delay wound healing. If you need help quitting, ask your health care provider.  Keep all follow-up visits as told by your health care provider. This is important.  Eat a diet that includes protein, vitamin A, vitamin C, and other nutrient-rich foods to help the wound heal.  Foods rich in protein include meat, dairy, beans, nuts, and other sources.  Foods rich in vitamin A include carrots and dark green, leafy vegetables.  Foods rich in vitamin C include citrus, tomatoes, and other fruits and vegetables.  Nutrient-rich foods have protein, carbohydrates, fat, vitamins, or minerals. Eat a variety of healthy foods including vegetables, fruits, and whole grains.  Contact a health care provider if:  You received a tetanus shot and you have swelling, severe pain, redness, or bleeding at the injection site.  Your pain is not controlled with medicine.  You have redness, swelling, or pain around the wound.  You have fluid or blood coming from the wound.  Your wound feels warm to the touch.  You have pus or a bad smell coming from the wound.  You have a fever or chills.  You are nauseous or you vomit.  You are dizzy.  Get help right away if:  You have a red streak going away from your wound.  The edges of the wound open up and separate.  Your wound is bleeding, and the bleeding does not stop with gentle pressure.  You have a rash.  You faint.  You have trouble breathing.  Summary  Always wash your hands with soap and water before changing your bandage  (dressing).  To help with healing, eat foods that are rich in protein, vitamin A, vitamin C, and other nutrients.  Check your wound every day for signs of infection. Contact your health care provider if you suspect that your wound is infected.  This information is not intended to replace advice given to you by your health care provider. Make sure you discuss any questions you have with your health care provider.  Document Released: 09/26/2009 Document Revised: 04/06/2020 Document Reviewed: 07/04/2017  Elsevier Patient Education © 2020 [employer]

## 2022-10-02 NOTE — CARE PLAN
Problem: Respiratory  Goal: Patient will achieve/maintain optimum respiratory ventilation and gas exchange  Outcome: Progressing     Problem: Mobility  Goal: Patient's capacity to carry out activities will improve  Outcome: Progressing   The patient is Stable - Low risk of patient condition declining or worsening    Shift Goals  Clinical Goals: rest  Patient Goals: sleep  Family Goals: N/A    Progress made toward(s) clinical / shift goals:  Patient is increasing mobility.     Patient is not progressing towards the following goals:

## 2022-10-02 NOTE — CARE PLAN
The patient is Stable - Low risk of patient condition declining or worsening    Shift Goals  Clinical Goals: Rest  Patient Goals: Rest  Family Goals: N/A      Problem: Mobility  Goal: Patient's capacity to carry out activities will improve  Outcome: Progressing     Problem: Self Care  Goal: Patient will have the ability to perform ADLs independently or with assistance (bathe, groom, dress, toilet and feed)  Outcome: Progressing       Progress made toward(s) clinical / shift goals:  Patient updated on the plan of care and all questions answered. Encouragement offered to patient to perform ADLs independently.

## 2022-10-03 PROCEDURE — A9270 NON-COVERED ITEM OR SERVICE: HCPCS | Performed by: STUDENT IN AN ORGANIZED HEALTH CARE EDUCATION/TRAINING PROGRAM

## 2022-10-03 PROCEDURE — A9270 NON-COVERED ITEM OR SERVICE: HCPCS | Performed by: NURSE PRACTITIONER

## 2022-10-03 PROCEDURE — G0378 HOSPITAL OBSERVATION PER HR: HCPCS

## 2022-10-03 PROCEDURE — 700102 HCHG RX REV CODE 250 W/ 637 OVERRIDE(OP): Performed by: NURSE PRACTITIONER

## 2022-10-03 PROCEDURE — 700102 HCHG RX REV CODE 250 W/ 637 OVERRIDE(OP): Performed by: STUDENT IN AN ORGANIZED HEALTH CARE EDUCATION/TRAINING PROGRAM

## 2022-10-03 RX ADMIN — ACETAMINOPHEN 650 MG: 325 TABLET, FILM COATED ORAL at 18:37

## 2022-10-03 RX ADMIN — DOCUSATE SODIUM 50 MG AND SENNOSIDES 8.6 MG 2 TABLET: 8.6; 5 TABLET, FILM COATED ORAL at 05:11

## 2022-10-03 RX ADMIN — RIVAROXABAN 10 MG: 10 TABLET, FILM COATED ORAL at 18:34

## 2022-10-03 RX ADMIN — ALBUTEROL SULFATE 2 PUFF: 90 AEROSOL, METERED RESPIRATORY (INHALATION) at 02:16

## 2022-10-03 RX ADMIN — ALBUTEROL SULFATE 2 PUFF: 90 AEROSOL, METERED RESPIRATORY (INHALATION) at 06:04

## 2022-10-03 RX ADMIN — LEVOTHYROXINE SODIUM 50 MCG: 0.05 TABLET ORAL at 05:11

## 2022-10-03 RX ADMIN — DOCUSATE SODIUM 50 MG AND SENNOSIDES 8.6 MG 2 TABLET: 8.6; 5 TABLET, FILM COATED ORAL at 18:34

## 2022-10-03 RX ADMIN — ALBUTEROL SULFATE 2 PUFF: 90 AEROSOL, METERED RESPIRATORY (INHALATION) at 18:38

## 2022-10-03 RX ADMIN — ACETAMINOPHEN 650 MG: 325 TABLET, FILM COATED ORAL at 05:19

## 2022-10-03 ASSESSMENT — PAIN DESCRIPTION - PAIN TYPE: TYPE: ACUTE PAIN

## 2022-10-03 NOTE — CARE PLAN
The patient is Stable - Low risk of patient condition declining or worsening    Shift Goals  Clinical Goals: rest, safety, monitor lung function  Patient Goals: rest  Family Goals: N/A    Progress made toward(s) clinical / shift goals:   Problem: Discharge Barriers/Planning  Goal: Patient's continuum of care needs are met  Outcome: Progressing     Problem: Respiratory  Goal: Patient will achieve/maintain optimum respiratory ventilation and gas exchange  Outcome: Progressing     Problem: Mobility  Goal: Patient's capacity to carry out activities will improve  Outcome: Progressing     Problem: Self Care  Goal: Patient will have the ability to perform ADLs independently or with assistance (bathe, groom, dress, toilet and feed)  Outcome: Progressing       Patient is not progressing towards the following goals:

## 2022-10-03 NOTE — CARE PLAN
The patient is Watcher - Medium risk of patient condition declining or worsening    Shift Goals  Clinical Goals: comfort  Patient Goals: Rest  Family Goals: N/A    Progress made toward(s) clinical / shift goals:    Problem: Discharge Barriers/Planning  Goal: Patient's continuum of care needs are met  Outcome: Progressing     Problem: Respiratory  Goal: Patient will achieve/maintain optimum respiratory ventilation and gas exchange  Outcome: Progressing     Problem: Mobility  Goal: Patient's capacity to carry out activities will improve  Outcome: Progressing     Problem: Self Care  Goal: Patient will have the ability to perform ADLs independently or with assistance (bathe, groom, dress, toilet and feed)  Outcome: Progressing       Patient is not progressing towards the following goals:

## 2022-10-04 PROCEDURE — 700102 HCHG RX REV CODE 250 W/ 637 OVERRIDE(OP): Performed by: STUDENT IN AN ORGANIZED HEALTH CARE EDUCATION/TRAINING PROGRAM

## 2022-10-04 PROCEDURE — A9270 NON-COVERED ITEM OR SERVICE: HCPCS | Performed by: STUDENT IN AN ORGANIZED HEALTH CARE EDUCATION/TRAINING PROGRAM

## 2022-10-04 PROCEDURE — A9270 NON-COVERED ITEM OR SERVICE: HCPCS | Performed by: NURSE PRACTITIONER

## 2022-10-04 PROCEDURE — G0378 HOSPITAL OBSERVATION PER HR: HCPCS

## 2022-10-04 PROCEDURE — 700102 HCHG RX REV CODE 250 W/ 637 OVERRIDE(OP): Performed by: NURSE PRACTITIONER

## 2022-10-04 RX ADMIN — LEVOTHYROXINE SODIUM 50 MCG: 0.05 TABLET ORAL at 05:45

## 2022-10-04 RX ADMIN — DOCUSATE SODIUM 50 MG AND SENNOSIDES 8.6 MG 2 TABLET: 8.6; 5 TABLET, FILM COATED ORAL at 05:45

## 2022-10-04 RX ADMIN — ALBUTEROL SULFATE 2 PUFF: 90 AEROSOL, METERED RESPIRATORY (INHALATION) at 03:39

## 2022-10-04 RX ADMIN — ALBUTEROL SULFATE 2 PUFF: 90 AEROSOL, METERED RESPIRATORY (INHALATION) at 05:45

## 2022-10-04 RX ADMIN — ACETAMINOPHEN 650 MG: 325 TABLET, FILM COATED ORAL at 21:13

## 2022-10-04 RX ADMIN — RIVAROXABAN 10 MG: 10 TABLET, FILM COATED ORAL at 17:23

## 2022-10-04 RX ADMIN — ALBUTEROL SULFATE 2 PUFF: 90 AEROSOL, METERED RESPIRATORY (INHALATION) at 20:35

## 2022-10-04 ASSESSMENT — PAIN DESCRIPTION - PAIN TYPE: TYPE: ACUTE PAIN

## 2022-10-04 NOTE — CARE PLAN
The patient is Watcher - Medium risk of patient condition declining or worsening    Shift Goals  Clinical Goals: safety  Patient Goals: stop diarrhea  Family Goals: N/A    Progress made toward(s) clinical / shift goals:    Problem: Respiratory  Goal: Patient will achieve/maintain optimum respiratory ventilation and gas exchange  Outcome: Progressing     Problem: Mobility  Goal: Patient's capacity to carry out activities will improve  Outcome: Progressing     Problem: Self Care  Goal: Patient will have the ability to perform ADLs independently or with assistance (bathe, groom, dress, toilet and feed)  Outcome: Progressing       Patient is not progressing towards the following goals:

## 2022-10-04 NOTE — PROGRESS NOTES
Pt is confused and has been on her call light all day long. She has asked about going to the bank, someone to look at her eyes because she needs glasses and she needs her denture fixed. I have tried multiple times today to reorient her and let her know that she is in the hospital and it works for a few minutes and then she is back on the light with the same questions and concerns. She does have a pad alarm on her bed but she is so impulsive she is in the bathroom before we can get to her room usually. Monitor for safety and needs.

## 2022-10-04 NOTE — PROGRESS NOTES
Pt sitting up in bed eating supper. She is alert with confusion noted. She has on 4 sweaters and jackets and has refused all day to take them off as well as a stocking hat and mask. She has eaten well today and drank a lot also. She is able to voice wants and needs to staff. She is very impulsive and has a bed alarm on for safety.

## 2022-10-04 NOTE — CARE PLAN
The patient is Stable - Low risk of patient condition declining or worsening    Shift Goals  Clinical Goals: comfort, safety, rest  Patient Goals: rest, get cottage cheese  Family Goals: N/A    Progress made toward(s) clinical / shift goals:    Problem: Discharge Barriers/Planning  Goal: Patient's continuum of care needs are met  Outcome: Progressing     Problem: Respiratory  Goal: Patient will achieve/maintain optimum respiratory ventilation and gas exchange  Outcome: Progressing     Problem: Mobility  Goal: Patient's capacity to carry out activities will improve  Outcome: Progressing     Problem: Self Care  Goal: Patient will have the ability to perform ADLs independently or with assistance (bathe, groom, dress, toilet and feed)  Outcome: Progressing       Patient is not progressing towards the following goals:

## 2022-10-05 PROCEDURE — 700102 HCHG RX REV CODE 250 W/ 637 OVERRIDE(OP): Performed by: NURSE PRACTITIONER

## 2022-10-05 PROCEDURE — 700102 HCHG RX REV CODE 250 W/ 637 OVERRIDE(OP): Performed by: STUDENT IN AN ORGANIZED HEALTH CARE EDUCATION/TRAINING PROGRAM

## 2022-10-05 PROCEDURE — A9270 NON-COVERED ITEM OR SERVICE: HCPCS | Performed by: NURSE PRACTITIONER

## 2022-10-05 PROCEDURE — A9270 NON-COVERED ITEM OR SERVICE: HCPCS | Performed by: STUDENT IN AN ORGANIZED HEALTH CARE EDUCATION/TRAINING PROGRAM

## 2022-10-05 PROCEDURE — 99224 PR SUBSEQUENT OBSERVATION CARE,LEVEL I: CPT | Performed by: STUDENT IN AN ORGANIZED HEALTH CARE EDUCATION/TRAINING PROGRAM

## 2022-10-05 PROCEDURE — G0378 HOSPITAL OBSERVATION PER HR: HCPCS

## 2022-10-05 RX ADMIN — RIVAROXABAN 10 MG: 10 TABLET, FILM COATED ORAL at 18:45

## 2022-10-05 RX ADMIN — LEVOTHYROXINE SODIUM 50 MCG: 0.05 TABLET ORAL at 09:44

## 2022-10-05 RX ADMIN — ALBUTEROL SULFATE 2 PUFF: 90 AEROSOL, METERED RESPIRATORY (INHALATION) at 12:01

## 2022-10-05 RX ADMIN — ALBUTEROL SULFATE 2 PUFF: 90 AEROSOL, METERED RESPIRATORY (INHALATION) at 03:15

## 2022-10-05 RX ADMIN — ALBUTEROL SULFATE 2 PUFF: 90 AEROSOL, METERED RESPIRATORY (INHALATION) at 21:22

## 2022-10-05 ASSESSMENT — ENCOUNTER SYMPTOMS
VOMITING: 0
FEVER: 0
PALPITATIONS: 0
NECK PAIN: 1
HEMOPTYSIS: 0
INSOMNIA: 0
ABDOMINAL PAIN: 0
HEADACHES: 0
NERVOUS/ANXIOUS: 0
COUGH: 0
DIARRHEA: 0
DIZZINESS: 0
CHILLS: 0
NAUSEA: 0

## 2022-10-05 ASSESSMENT — PAIN DESCRIPTION - PAIN TYPE: TYPE: ACUTE PAIN
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
## 2022-10-05 NOTE — CARE PLAN
The patient is Stable - Low risk of patient condition declining or worsening    Shift Goals  Clinical Goals: safety, placement  Patient Goals: get rid of headache and get abx for lungs  Family Goals: N/A    Progress made toward(s) clinical / shift goals:  Patient has been resting throughout the night.    Patient is not progressing towards the following goals: N/A

## 2022-10-05 NOTE — PROGRESS NOTES
Assumed care of patient at 1900. Patient is oriented to self and place. BP soft but retake of BP stable. Patient requires frequent reorientation to situation. Patient requesting antibiotics for lungs but refuses to keep IV in place. Patient complained of headache, Tylenol given. Patient complains of being warm but refuses to take jacket or any other layers off. Patient also refusing skin check. Fall precautions in place including bed alarm on, bed locked and in lowest position. Call light and personal belongings within reach.

## 2022-10-06 PROCEDURE — A9270 NON-COVERED ITEM OR SERVICE: HCPCS | Performed by: STUDENT IN AN ORGANIZED HEALTH CARE EDUCATION/TRAINING PROGRAM

## 2022-10-06 PROCEDURE — A9270 NON-COVERED ITEM OR SERVICE: HCPCS | Performed by: NURSE PRACTITIONER

## 2022-10-06 PROCEDURE — 700102 HCHG RX REV CODE 250 W/ 637 OVERRIDE(OP): Performed by: STUDENT IN AN ORGANIZED HEALTH CARE EDUCATION/TRAINING PROGRAM

## 2022-10-06 PROCEDURE — G0378 HOSPITAL OBSERVATION PER HR: HCPCS

## 2022-10-06 PROCEDURE — 700102 HCHG RX REV CODE 250 W/ 637 OVERRIDE(OP): Performed by: NURSE PRACTITIONER

## 2022-10-06 RX ADMIN — DOCUSATE SODIUM 50 MG AND SENNOSIDES 8.6 MG 2 TABLET: 8.6; 5 TABLET, FILM COATED ORAL at 17:54

## 2022-10-06 RX ADMIN — RIVAROXABAN 10 MG: 10 TABLET, FILM COATED ORAL at 17:54

## 2022-10-06 RX ADMIN — ACETAMINOPHEN 650 MG: 325 TABLET, FILM COATED ORAL at 20:56

## 2022-10-06 RX ADMIN — LEVOTHYROXINE SODIUM 50 MCG: 0.05 TABLET ORAL at 04:30

## 2022-10-06 RX ADMIN — DOCUSATE SODIUM 50 MG AND SENNOSIDES 8.6 MG 2 TABLET: 8.6; 5 TABLET, FILM COATED ORAL at 04:30

## 2022-10-06 RX ADMIN — ALBUTEROL SULFATE 2 PUFF: 90 AEROSOL, METERED RESPIRATORY (INHALATION) at 04:30

## 2022-10-06 ASSESSMENT — PATIENT HEALTH QUESTIONNAIRE - PHQ9
1. LITTLE INTEREST OR PLEASURE IN DOING THINGS: NOT AT ALL
SUM OF ALL RESPONSES TO PHQ9 QUESTIONS 1 AND 2: 0
2. FEELING DOWN, DEPRESSED, IRRITABLE, OR HOPELESS: NOT AT ALL

## 2022-10-06 ASSESSMENT — PAIN DESCRIPTION - PAIN TYPE
TYPE: ACUTE PAIN

## 2022-10-06 NOTE — PROGRESS NOTES
Pt complaining of SOB, gave PRN albuterol and contacted RT for neb treatment. VS stable, assessment remains the same.

## 2022-10-06 NOTE — CARE PLAN
Problem: Respiratory  Goal: Patient will achieve/maintain optimum respiratory ventilation and gas exchange  Outcome: Progressing     Problem: Self Care  Goal: Patient will have the ability to perform ADLs independently or with assistance (bathe, groom, dress, toilet and feed)  Outcome: Progressing       The patient is Stable - Low risk of patient condition declining or worsening    Shift Goals  Clinical Goals: Monitor labs, VS, awaiting placement, safety and reorient as needed  Patient Goals: Stay here and rest  Family Goals: No family at bedside at present time    Progress made toward(s) clinical / shift goals:  Patient continued to be instructed on doing own personal care with minimial assistance, encouraged to do ADL's on a daily basis, as she will refuses to do them frequently. Patient receiving medications as needed and will continue to monitor.      Patient is not progressing towards the following goals:
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
## 2022-10-07 PROCEDURE — A9270 NON-COVERED ITEM OR SERVICE: HCPCS | Performed by: STUDENT IN AN ORGANIZED HEALTH CARE EDUCATION/TRAINING PROGRAM

## 2022-10-07 PROCEDURE — 700102 HCHG RX REV CODE 250 W/ 637 OVERRIDE(OP): Performed by: STUDENT IN AN ORGANIZED HEALTH CARE EDUCATION/TRAINING PROGRAM

## 2022-10-07 PROCEDURE — 700102 HCHG RX REV CODE 250 W/ 637 OVERRIDE(OP): Performed by: NURSE PRACTITIONER

## 2022-10-07 PROCEDURE — A9270 NON-COVERED ITEM OR SERVICE: HCPCS | Performed by: NURSE PRACTITIONER

## 2022-10-07 PROCEDURE — G0378 HOSPITAL OBSERVATION PER HR: HCPCS

## 2022-10-07 RX ADMIN — DOCUSATE SODIUM 50 MG AND SENNOSIDES 8.6 MG 2 TABLET: 8.6; 5 TABLET, FILM COATED ORAL at 04:26

## 2022-10-07 RX ADMIN — RIVAROXABAN 10 MG: 10 TABLET, FILM COATED ORAL at 18:36

## 2022-10-07 RX ADMIN — ALBUTEROL SULFATE 2 PUFF: 90 AEROSOL, METERED RESPIRATORY (INHALATION) at 04:24

## 2022-10-07 RX ADMIN — ALBUTEROL SULFATE 2 PUFF: 90 AEROSOL, METERED RESPIRATORY (INHALATION) at 23:49

## 2022-10-07 RX ADMIN — LEVOTHYROXINE SODIUM 50 MCG: 0.05 TABLET ORAL at 04:25

## 2022-10-07 RX ADMIN — ALBUTEROL SULFATE 2 PUFF: 90 AEROSOL, METERED RESPIRATORY (INHALATION) at 21:01

## 2022-10-07 RX ADMIN — DOCUSATE SODIUM 50 MG AND SENNOSIDES 8.6 MG 2 TABLET: 8.6; 5 TABLET, FILM COATED ORAL at 18:35

## 2022-10-07 RX ADMIN — ALBUTEROL SULFATE 2 PUFF: 90 AEROSOL, METERED RESPIRATORY (INHALATION) at 00:31

## 2022-10-07 ASSESSMENT — PAIN DESCRIPTION - PAIN TYPE
TYPE: ACUTE PAIN

## 2022-10-07 NOTE — CARE PLAN
Problem: Respiratory  Goal: Patient will achieve/maintain optimum respiratory ventilation and gas exchange  Outcome: Progressing     Problem: Self Care  Goal: Patient will have the ability to perform ADLs independently or with assistance (bathe, groom, dress, toilet and feed)  Outcome: Progressing       The patient is Stable - Low risk of patient condition declining or worsening    Shift Goals  Clinical Goals: Reorient. safety, monitor labs and VS, rest, and comfort  Patient Goals: Rest and comfort  Family Goals: No family at bedside at this time    Progress made toward(s) clinical / shift goals:  Reoriented patient as needed, encouraged self care    Patient is not progressing towards the following goals:

## 2022-10-08 PROCEDURE — 700102 HCHG RX REV CODE 250 W/ 637 OVERRIDE(OP): Performed by: STUDENT IN AN ORGANIZED HEALTH CARE EDUCATION/TRAINING PROGRAM

## 2022-10-08 PROCEDURE — A9270 NON-COVERED ITEM OR SERVICE: HCPCS | Performed by: STUDENT IN AN ORGANIZED HEALTH CARE EDUCATION/TRAINING PROGRAM

## 2022-10-08 PROCEDURE — 700102 HCHG RX REV CODE 250 W/ 637 OVERRIDE(OP): Performed by: NURSE PRACTITIONER

## 2022-10-08 PROCEDURE — A9270 NON-COVERED ITEM OR SERVICE: HCPCS | Performed by: NURSE PRACTITIONER

## 2022-10-08 PROCEDURE — 94640 AIRWAY INHALATION TREATMENT: CPT

## 2022-10-08 PROCEDURE — 700101 HCHG RX REV CODE 250: Performed by: STUDENT IN AN ORGANIZED HEALTH CARE EDUCATION/TRAINING PROGRAM

## 2022-10-08 PROCEDURE — G0378 HOSPITAL OBSERVATION PER HR: HCPCS

## 2022-10-08 RX ADMIN — IPRATROPIUM BROMIDE AND ALBUTEROL SULFATE 3 ML: 2.5; .5 SOLUTION RESPIRATORY (INHALATION) at 16:09

## 2022-10-08 RX ADMIN — LEVOTHYROXINE SODIUM 50 MCG: 0.05 TABLET ORAL at 05:21

## 2022-10-08 RX ADMIN — DOCUSATE SODIUM 50 MG AND SENNOSIDES 8.6 MG 2 TABLET: 8.6; 5 TABLET, FILM COATED ORAL at 05:21

## 2022-10-08 RX ADMIN — ALBUTEROL SULFATE 2 PUFF: 90 AEROSOL, METERED RESPIRATORY (INHALATION) at 15:49

## 2022-10-08 RX ADMIN — ALBUTEROL SULFATE 2 PUFF: 90 AEROSOL, METERED RESPIRATORY (INHALATION) at 06:12

## 2022-10-08 RX ADMIN — DOCUSATE SODIUM 50 MG AND SENNOSIDES 8.6 MG 2 TABLET: 8.6; 5 TABLET, FILM COATED ORAL at 17:39

## 2022-10-08 RX ADMIN — RIVAROXABAN 10 MG: 10 TABLET, FILM COATED ORAL at 17:39

## 2022-10-08 RX ADMIN — ALBUTEROL SULFATE 2 PUFF: 90 AEROSOL, METERED RESPIRATORY (INHALATION) at 11:11

## 2022-10-08 ASSESSMENT — LIFESTYLE VARIABLES
HAVE YOU EVER FELT YOU SHOULD CUT DOWN ON YOUR DRINKING: NO
TOTAL SCORE: 0
ALCOHOL_USE: NO
REASON UNABLE TO ASSESS: CONFUSED
CONSUMPTION TOTAL: NEGATIVE
TOTAL SCORE: 0
AVERAGE NUMBER OF DAYS PER WEEK YOU HAVE A DRINK CONTAINING ALCOHOL: 0
DOES PATIENT WANT TO STOP DRINKING: NO
ON A TYPICAL DAY WHEN YOU DRINK ALCOHOL HOW MANY DRINKS DO YOU HAVE: 0
EVER HAD A DRINK FIRST THING IN THE MORNING TO STEADY YOUR NERVES TO GET RID OF A HANGOVER: NO
TOTAL SCORE: 0
EVER FELT BAD OR GUILTY ABOUT YOUR DRINKING: NO
HAVE PEOPLE ANNOYED YOU BY CRITICIZING YOUR DRINKING: NO
HOW MANY TIMES IN THE PAST YEAR HAVE YOU HAD 5 OR MORE DRINKS IN A DAY: 0

## 2022-10-08 ASSESSMENT — FIBROSIS 4 INDEX: FIB4 SCORE: 2.111111111111111111

## 2022-10-08 ASSESSMENT — PAIN DESCRIPTION - PAIN TYPE
TYPE: ACUTE PAIN

## 2022-10-08 NOTE — CARE PLAN
The patient is Stable - Low risk of patient condition declining or worsening    Shift Goals  Clinical Goals: Safety, rest  Patient Goals: Rest  Family Goals: ALEX    Progress made toward(s) clinical / shift goals:    Pt is a SBA when OOB. Encouraged pt to call when she needs to get OOB. Pt refuses despite education. Needs reinforcement.

## 2022-10-08 NOTE — PROGRESS NOTES
Assumed care of patient at bedside report from NOC RN. Updated on POC. Patient currently A & O x 2, disoriented to situation and time; on room air to 2 L O2 nasal cannula at night for comfort; up standby assist; without complaints of acute pain. Assessment completed. Call light within reach. Whiteboard updated. Fall precautions in place. Bed locked and in lowest position. All questions answered. No other needs indicated at this time.

## 2022-10-08 NOTE — CARE PLAN
The patient is Stable - Low risk of patient condition declining or worsening    Shift Goals  Clinical Goals: safe mobilization; maintain skin integ  Patient Goals: rest  Family Goals: no family present    Progress made toward(s) clinical / shift goals:    Problem: Mobility  Goal: Patient's capacity to carry out activities will improve  Outcome: Progressing     Problem: Self Care  Goal: Patient will have the ability to perform ADLs independently or with assistance (bathe, groom, dress, toilet and feed)  10/8/2022 1011 by Mirta Madrid R.N.  Outcome: Progressing  10/8/2022 1011 by Mirta Madrid R.N.  Outcome: Progressing     Problem: Knowledge Deficit - Standard  Goal: Patient and family/care givers will demonstrate understanding of plan of care, disease process/condition, diagnostic tests and medications  Outcome: Progressing   Discussed daily POC. Encouraged safe mobilization and calling for assistance. Encouraged engagement in self care.    Patient is not progressing towards the following goals:

## 2022-10-08 NOTE — PROGRESS NOTES
Report received, assumed care at 1900. Pt is A&Ox2, anxious. Requesting her inhaler, states she feels short of breath. No visible signs of shortness of breath, no increase work of breathing, no retractions, pt able to speak in full sentences with ease. Will give her the albuterol inhaler per request. Denies pain. SBA.  Alarm on bed turned on. Pt updated on POC, needs met and questions answered.  Calls appropriately.  Call light within reach and working properly.

## 2022-10-09 PROCEDURE — G0378 HOSPITAL OBSERVATION PER HR: HCPCS

## 2022-10-09 PROCEDURE — A9270 NON-COVERED ITEM OR SERVICE: HCPCS | Performed by: STUDENT IN AN ORGANIZED HEALTH CARE EDUCATION/TRAINING PROGRAM

## 2022-10-09 PROCEDURE — 700102 HCHG RX REV CODE 250 W/ 637 OVERRIDE(OP): Performed by: STUDENT IN AN ORGANIZED HEALTH CARE EDUCATION/TRAINING PROGRAM

## 2022-10-09 PROCEDURE — A9270 NON-COVERED ITEM OR SERVICE: HCPCS | Performed by: NURSE PRACTITIONER

## 2022-10-09 PROCEDURE — 99224 PR SUBSEQUENT OBSERVATION CARE,LEVEL I: CPT | Performed by: STUDENT IN AN ORGANIZED HEALTH CARE EDUCATION/TRAINING PROGRAM

## 2022-10-09 PROCEDURE — 94760 N-INVAS EAR/PLS OXIMETRY 1: CPT

## 2022-10-09 PROCEDURE — 700101 HCHG RX REV CODE 250: Performed by: STUDENT IN AN ORGANIZED HEALTH CARE EDUCATION/TRAINING PROGRAM

## 2022-10-09 PROCEDURE — 700102 HCHG RX REV CODE 250 W/ 637 OVERRIDE(OP): Performed by: NURSE PRACTITIONER

## 2022-10-09 PROCEDURE — 94640 AIRWAY INHALATION TREATMENT: CPT

## 2022-10-09 RX ORDER — LORATADINE 10 MG/1
10 TABLET ORAL
Status: DISCONTINUED | OUTPATIENT
Start: 2022-10-09 | End: 2022-10-20 | Stop reason: HOSPADM

## 2022-10-09 RX ORDER — CARBOXYMETHYLCELLULOSE SODIUM 5 MG/ML
1 SOLUTION/ DROPS OPHTHALMIC PRN
Status: DISCONTINUED | OUTPATIENT
Start: 2022-10-09 | End: 2022-10-20 | Stop reason: HOSPADM

## 2022-10-09 RX ADMIN — LORATADINE 10 MG: 10 TABLET ORAL at 12:46

## 2022-10-09 RX ADMIN — CARBOXYMETHYLCELLULOSE SODIUM 1 DROP: 5 SOLUTION/ DROPS OPHTHALMIC at 12:46

## 2022-10-09 RX ADMIN — DOCUSATE SODIUM 50 MG AND SENNOSIDES 8.6 MG 2 TABLET: 8.6; 5 TABLET, FILM COATED ORAL at 06:23

## 2022-10-09 RX ADMIN — IPRATROPIUM BROMIDE AND ALBUTEROL SULFATE 3 ML: 2.5; .5 SOLUTION RESPIRATORY (INHALATION) at 18:21

## 2022-10-09 RX ADMIN — IPRATROPIUM BROMIDE AND ALBUTEROL SULFATE 3 ML: 2.5; .5 SOLUTION RESPIRATORY (INHALATION) at 01:10

## 2022-10-09 RX ADMIN — ALBUTEROL SULFATE 2 PUFF: 90 AEROSOL, METERED RESPIRATORY (INHALATION) at 08:12

## 2022-10-09 RX ADMIN — RIVAROXABAN 10 MG: 10 TABLET, FILM COATED ORAL at 17:23

## 2022-10-09 RX ADMIN — ALBUTEROL SULFATE 2 PUFF: 90 AEROSOL, METERED RESPIRATORY (INHALATION) at 00:51

## 2022-10-09 RX ADMIN — ALBUTEROL SULFATE 2 PUFF: 90 AEROSOL, METERED RESPIRATORY (INHALATION) at 17:24

## 2022-10-09 RX ADMIN — LEVOTHYROXINE SODIUM 50 MCG: 0.05 TABLET ORAL at 06:23

## 2022-10-09 ASSESSMENT — ENCOUNTER SYMPTOMS
COUGH: 0
EYE DISCHARGE: 0
PALPITATIONS: 0
CHILLS: 0
EYE PAIN: 0
NERVOUS/ANXIOUS: 0
INSOMNIA: 0
NECK PAIN: 0
ABDOMINAL PAIN: 0
FEVER: 0
VOMITING: 0
DIZZINESS: 0
NAUSEA: 0
DIARRHEA: 0
HEADACHES: 0
HEMOPTYSIS: 0

## 2022-10-09 ASSESSMENT — PAIN DESCRIPTION - PAIN TYPE
TYPE: ACUTE PAIN

## 2022-10-09 NOTE — CARE PLAN
The patient is Stable - Low risk of patient condition declining or worsening    Shift Goals  Clinical Goals: Safety, rest  Patient Goals: Rest  Family Goals: N/A    Progress Note    Patient is Aox2 confused, moves all extremities. Neurologically intact. Vital signs stable. On room air, diminished lung sounds, bowel sounds present, last BM 10/8, voiding. Has no PIV.     Other: Patient denies pain.
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
## 2022-10-10 PROCEDURE — A9270 NON-COVERED ITEM OR SERVICE: HCPCS | Performed by: STUDENT IN AN ORGANIZED HEALTH CARE EDUCATION/TRAINING PROGRAM

## 2022-10-10 PROCEDURE — 700102 HCHG RX REV CODE 250 W/ 637 OVERRIDE(OP): Performed by: STUDENT IN AN ORGANIZED HEALTH CARE EDUCATION/TRAINING PROGRAM

## 2022-10-10 PROCEDURE — 700102 HCHG RX REV CODE 250 W/ 637 OVERRIDE(OP): Performed by: NURSE PRACTITIONER

## 2022-10-10 PROCEDURE — A9270 NON-COVERED ITEM OR SERVICE: HCPCS | Performed by: NURSE PRACTITIONER

## 2022-10-10 PROCEDURE — 94640 AIRWAY INHALATION TREATMENT: CPT

## 2022-10-10 PROCEDURE — G0378 HOSPITAL OBSERVATION PER HR: HCPCS

## 2022-10-10 PROCEDURE — 700101 HCHG RX REV CODE 250: Performed by: STUDENT IN AN ORGANIZED HEALTH CARE EDUCATION/TRAINING PROGRAM

## 2022-10-10 RX ADMIN — CARBOXYMETHYLCELLULOSE SODIUM 1 DROP: 5 SOLUTION/ DROPS OPHTHALMIC at 05:01

## 2022-10-10 RX ADMIN — ALBUTEROL SULFATE 2 PUFF: 90 AEROSOL, METERED RESPIRATORY (INHALATION) at 11:39

## 2022-10-10 RX ADMIN — RIVAROXABAN 10 MG: 10 TABLET, FILM COATED ORAL at 18:03

## 2022-10-10 RX ADMIN — IPRATROPIUM BROMIDE AND ALBUTEROL SULFATE 3 ML: 2.5; .5 SOLUTION RESPIRATORY (INHALATION) at 22:18

## 2022-10-10 RX ADMIN — ALBUTEROL SULFATE 2 PUFF: 90 AEROSOL, METERED RESPIRATORY (INHALATION) at 01:30

## 2022-10-10 RX ADMIN — ALBUTEROL SULFATE 2 PUFF: 90 AEROSOL, METERED RESPIRATORY (INHALATION) at 16:45

## 2022-10-10 RX ADMIN — ALBUTEROL SULFATE 2 PUFF: 90 AEROSOL, METERED RESPIRATORY (INHALATION) at 05:01

## 2022-10-10 RX ADMIN — DOCUSATE SODIUM 50 MG AND SENNOSIDES 8.6 MG 2 TABLET: 8.6; 5 TABLET, FILM COATED ORAL at 18:03

## 2022-10-10 RX ADMIN — LEVOTHYROXINE SODIUM 50 MCG: 0.05 TABLET ORAL at 05:00

## 2022-10-10 RX ADMIN — ACETAMINOPHEN 650 MG: 325 TABLET, FILM COATED ORAL at 08:54

## 2022-10-10 RX ADMIN — ALBUTEROL SULFATE 2 PUFF: 90 AEROSOL, METERED RESPIRATORY (INHALATION) at 21:18

## 2022-10-10 ASSESSMENT — PATIENT HEALTH QUESTIONNAIRE - PHQ9
SUM OF ALL RESPONSES TO PHQ9 QUESTIONS 1 AND 2: 0
2. FEELING DOWN, DEPRESSED, IRRITABLE, OR HOPELESS: NOT AT ALL
1. LITTLE INTEREST OR PLEASURE IN DOING THINGS: NOT AT ALL

## 2022-10-10 ASSESSMENT — PAIN DESCRIPTION - PAIN TYPE
TYPE: ACUTE PAIN
TYPE: ACUTE PAIN

## 2022-10-10 NOTE — CARE PLAN
Problem: Mobility  Goal: Patient's capacity to carry out activities will improve  Outcome: Progressing     Problem: Knowledge Deficit - Standard  Goal: Patient and family/care givers will demonstrate understanding of plan of care, disease process/condition, diagnostic tests and medications  Outcome: Progressing   The patient is Stable - Low risk of patient condition declining or worsening    Shift Goals  Clinical Goals: Safety  Patient Goals: Rest  Family Goals: N/A    Progress made toward(s) clinical / shift goals:  Pt ambulating the halls frequently, turns self in bed.     Patient is not progressing towards the following goals:

## 2022-10-10 NOTE — PROGRESS NOTES
Assumed care at 0700. Received report from NOC shift RN. Pt is awake and alert in bed, A&Ox self, on RA, reports a pain level of 0/10. Fall precautions and appropriate signs in place. Call light and belongings within reach. Bed alarm and hourly rounding in place. Communication board updated. Pt denies any additional needs at this time.

## 2022-10-11 PROCEDURE — G0378 HOSPITAL OBSERVATION PER HR: HCPCS

## 2022-10-11 PROCEDURE — 700102 HCHG RX REV CODE 250 W/ 637 OVERRIDE(OP): Performed by: STUDENT IN AN ORGANIZED HEALTH CARE EDUCATION/TRAINING PROGRAM

## 2022-10-11 PROCEDURE — 94640 AIRWAY INHALATION TREATMENT: CPT

## 2022-10-11 PROCEDURE — 700101 HCHG RX REV CODE 250: Performed by: STUDENT IN AN ORGANIZED HEALTH CARE EDUCATION/TRAINING PROGRAM

## 2022-10-11 PROCEDURE — A9270 NON-COVERED ITEM OR SERVICE: HCPCS | Performed by: STUDENT IN AN ORGANIZED HEALTH CARE EDUCATION/TRAINING PROGRAM

## 2022-10-11 PROCEDURE — A9270 NON-COVERED ITEM OR SERVICE: HCPCS | Performed by: NURSE PRACTITIONER

## 2022-10-11 PROCEDURE — 700102 HCHG RX REV CODE 250 W/ 637 OVERRIDE(OP): Performed by: NURSE PRACTITIONER

## 2022-10-11 RX ADMIN — ALBUTEROL SULFATE 2 PUFF: 90 AEROSOL, METERED RESPIRATORY (INHALATION) at 17:22

## 2022-10-11 RX ADMIN — ALBUTEROL SULFATE 2 PUFF: 90 AEROSOL, METERED RESPIRATORY (INHALATION) at 08:56

## 2022-10-11 RX ADMIN — ALBUTEROL SULFATE 2 PUFF: 90 AEROSOL, METERED RESPIRATORY (INHALATION) at 06:07

## 2022-10-11 RX ADMIN — IPRATROPIUM BROMIDE AND ALBUTEROL SULFATE 3 ML: 2.5; .5 SOLUTION RESPIRATORY (INHALATION) at 09:02

## 2022-10-11 RX ADMIN — ALBUTEROL SULFATE 2 PUFF: 90 AEROSOL, METERED RESPIRATORY (INHALATION) at 20:18

## 2022-10-11 RX ADMIN — IPRATROPIUM BROMIDE AND ALBUTEROL SULFATE 3 ML: 2.5; .5 SOLUTION RESPIRATORY (INHALATION) at 06:19

## 2022-10-11 RX ADMIN — DOCUSATE SODIUM 50 MG AND SENNOSIDES 8.6 MG 2 TABLET: 8.6; 5 TABLET, FILM COATED ORAL at 17:06

## 2022-10-11 RX ADMIN — RIVAROXABAN 10 MG: 10 TABLET, FILM COATED ORAL at 17:06

## 2022-10-11 RX ADMIN — LEVOTHYROXINE SODIUM 50 MCG: 0.05 TABLET ORAL at 04:54

## 2022-10-11 RX ADMIN — CARBOXYMETHYLCELLULOSE SODIUM 1 DROP: 5 SOLUTION/ DROPS OPHTHALMIC at 06:08

## 2022-10-11 RX ADMIN — DOCUSATE SODIUM 50 MG AND SENNOSIDES 8.6 MG 2 TABLET: 8.6; 5 TABLET, FILM COATED ORAL at 04:54

## 2022-10-11 ASSESSMENT — PAIN DESCRIPTION - PAIN TYPE: TYPE: ACUTE PAIN

## 2022-10-11 NOTE — CARE PLAN
The patient is Stable - Low risk of patient condition declining or worsening    Shift Goals  Clinical Goals: safety, rest and comfort  Patient Goals: rest, sleeps well  Family Goals: n/a      Problem: Discharge Barriers/Planning  Goal: Patient's continuum of care needs are met  Outcome: Progressing     Problem: Respiratory  Goal: Patient will achieve/maintain optimum respiratory ventilation and gas exchange  Outcome: Progressing     Problem: Mobility  Goal: Patient's capacity to carry out activities will improve  Outcome: Progressing     Problem: Self Care  Goal: Patient will have the ability to perform ADLs independently or with assistance (bathe, groom, dress, toilet and feed)  Outcome: Progressing     Problem: Knowledge Deficit - Standard  Goal: Patient and family/care givers will demonstrate understanding of plan of care, disease process/condition, diagnostic tests and medications  Outcome: Progressing       Progress made toward(s) clinical / shift goals:  Patient is alert and oriented x 2. Not oriented to time and situation. Complaint of SOB. Albuterol inhaler given, says doesn't helps. RT made aware. Ambulating in the hallway, anxious, irritable and refusing assistance. Currently in bed now. Bed alarm in placed, Bed in lowest position, Call light and personal items within reached. Kept safe and monitored.     Patient is not progressing towards the following goals:

## 2022-10-11 NOTE — PROGRESS NOTES
Patient is alert and oriented x 2. Not oriented to time and situation. Complaint of SOB. Albuterol inhaler given, says doesn't helps. RT made aware. Ambulating in the hallway, anxious, irritable and refusing assistance. Currently in bed now. Bed alarm in placed, Bed in lowest position, Call light and personal items within reached. Kept safe and monitored.

## 2022-10-12 PROBLEM — R51.9 HEADACHE: Status: ACTIVE | Noted: 2022-10-12

## 2022-10-12 PROCEDURE — 700101 HCHG RX REV CODE 250: Performed by: STUDENT IN AN ORGANIZED HEALTH CARE EDUCATION/TRAINING PROGRAM

## 2022-10-12 PROCEDURE — 700102 HCHG RX REV CODE 250 W/ 637 OVERRIDE(OP): Performed by: STUDENT IN AN ORGANIZED HEALTH CARE EDUCATION/TRAINING PROGRAM

## 2022-10-12 PROCEDURE — 99224 PR SUBSEQUENT OBSERVATION CARE,LEVEL I: CPT | Performed by: STUDENT IN AN ORGANIZED HEALTH CARE EDUCATION/TRAINING PROGRAM

## 2022-10-12 PROCEDURE — A9270 NON-COVERED ITEM OR SERVICE: HCPCS | Performed by: NURSE PRACTITIONER

## 2022-10-12 PROCEDURE — A9270 NON-COVERED ITEM OR SERVICE: HCPCS | Performed by: STUDENT IN AN ORGANIZED HEALTH CARE EDUCATION/TRAINING PROGRAM

## 2022-10-12 PROCEDURE — G0378 HOSPITAL OBSERVATION PER HR: HCPCS

## 2022-10-12 PROCEDURE — 94640 AIRWAY INHALATION TREATMENT: CPT

## 2022-10-12 PROCEDURE — 700102 HCHG RX REV CODE 250 W/ 637 OVERRIDE(OP): Performed by: NURSE PRACTITIONER

## 2022-10-12 RX ADMIN — ALBUTEROL SULFATE 2 PUFF: 90 AEROSOL, METERED RESPIRATORY (INHALATION) at 05:14

## 2022-10-12 RX ADMIN — ALBUTEROL SULFATE 2 PUFF: 90 AEROSOL, METERED RESPIRATORY (INHALATION) at 18:00

## 2022-10-12 RX ADMIN — IPRATROPIUM BROMIDE AND ALBUTEROL SULFATE 3 ML: 2.5; .5 SOLUTION RESPIRATORY (INHALATION) at 15:22

## 2022-10-12 RX ADMIN — LEVOTHYROXINE SODIUM 50 MCG: 0.05 TABLET ORAL at 04:28

## 2022-10-12 RX ADMIN — DOCUSATE SODIUM 50 MG AND SENNOSIDES 8.6 MG 2 TABLET: 8.6; 5 TABLET, FILM COATED ORAL at 04:28

## 2022-10-12 RX ADMIN — ACETAMINOPHEN 650 MG: 325 TABLET, FILM COATED ORAL at 04:28

## 2022-10-12 RX ADMIN — ALBUTEROL SULFATE 2 PUFF: 90 AEROSOL, METERED RESPIRATORY (INHALATION) at 00:09

## 2022-10-12 RX ADMIN — RIVAROXABAN 10 MG: 10 TABLET, FILM COATED ORAL at 17:57

## 2022-10-12 RX ADMIN — IPRATROPIUM BROMIDE AND ALBUTEROL SULFATE 3 ML: 2.5; .5 SOLUTION RESPIRATORY (INHALATION) at 07:55

## 2022-10-12 RX ADMIN — ALBUTEROL SULFATE 2 PUFF: 90 AEROSOL, METERED RESPIRATORY (INHALATION) at 22:13

## 2022-10-12 RX ADMIN — ALBUTEROL SULFATE 2 PUFF: 90 AEROSOL, METERED RESPIRATORY (INHALATION) at 02:29

## 2022-10-12 ASSESSMENT — ENCOUNTER SYMPTOMS
NAUSEA: 0
CHILLS: 0
NERVOUS/ANXIOUS: 0
VOMITING: 0
EYE PAIN: 0
INSOMNIA: 0
PALPITATIONS: 0
COUGH: 0
EYE DISCHARGE: 0
FEVER: 0
NECK PAIN: 0
HEADACHES: 1
DIARRHEA: 0
ABDOMINAL PAIN: 0
DIZZINESS: 0
HEMOPTYSIS: 0

## 2022-10-12 ASSESSMENT — PAIN DESCRIPTION - PAIN TYPE
TYPE: ACUTE PAIN
TYPE: ACUTE PAIN
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
## 2022-10-12 NOTE — PROGRESS NOTES
Patient is alert and oriented x 2. Not oriented to time and situation. Complaint of SOB. Albuterol inhaler given, says doesn't helps. RT made aware. Ambulating in the hallway, anxious, irritable and refusing assistance. Up and sitting in a chair at nurses station. Bed alarm in placed, Bed in lowest position, Call light and personal items within reached. Kept safe and monitored.

## 2022-10-12 NOTE — DISCHARGE PLANNING
Case Management Discharge Planning    Admission Date: 9/26/2022  GMLOS:    ALOS: 0    6-Clicks ADL Score: 17  6-Clicks Mobility Score: 17  PT and/or OT Eval ordered: Yes  Post-acute Referrals Ordered: Yes  Post-acute Choice Obtained: Yes  Has referral(s) been sent to post-acute provider:  Yes      Anticipated Discharge Dispo: Discharge Disposition: Still a Patient (30)    DME Needed: No    Action(s) Taken: Updated Provider/Nurse on Discharge Plan    Escalations Completed: Leadership    Medically Clear: Yes    Next Steps: Lsw received [employer] from [facility] Director asking for Lsw to call [facility] to see if anyone has an opening.    Lsw received list of 3 to start calling and ask for bedside assessments.    Lsw called Bill with [facility] [phone]. Think might have bed open for female w/ hospice. Pt is petite and able to be moved safely. He will see pt for assessment and update LSW.    Still needs Hospice choice. She is not A/O today.    He will speak w/ pt tonight and speak w/ LSw tomorrow.  Mostly has women at  she would get along. His only concern is BEAR as he would like several months and renew. He is wondering if pt has an expected time on Hospice.     Barriers to Discharge: Pending Placement    Is the patient up for discharge tomorrow: No

## 2022-10-13 PROCEDURE — A9270 NON-COVERED ITEM OR SERVICE: HCPCS | Performed by: STUDENT IN AN ORGANIZED HEALTH CARE EDUCATION/TRAINING PROGRAM

## 2022-10-13 PROCEDURE — 700102 HCHG RX REV CODE 250 W/ 637 OVERRIDE(OP): Performed by: STUDENT IN AN ORGANIZED HEALTH CARE EDUCATION/TRAINING PROGRAM

## 2022-10-13 PROCEDURE — 700102 HCHG RX REV CODE 250 W/ 637 OVERRIDE(OP): Performed by: NURSE PRACTITIONER

## 2022-10-13 PROCEDURE — G0378 HOSPITAL OBSERVATION PER HR: HCPCS

## 2022-10-13 PROCEDURE — A9270 NON-COVERED ITEM OR SERVICE: HCPCS | Performed by: NURSE PRACTITIONER

## 2022-10-13 RX ADMIN — ALBUTEROL SULFATE 2 PUFF: 90 AEROSOL, METERED RESPIRATORY (INHALATION) at 16:15

## 2022-10-13 RX ADMIN — ALBUTEROL SULFATE 2 PUFF: 90 AEROSOL, METERED RESPIRATORY (INHALATION) at 19:30

## 2022-10-13 RX ADMIN — ALBUTEROL SULFATE 2 PUFF: 90 AEROSOL, METERED RESPIRATORY (INHALATION) at 12:15

## 2022-10-13 RX ADMIN — ALBUTEROL SULFATE 2 PUFF: 90 AEROSOL, METERED RESPIRATORY (INHALATION) at 22:57

## 2022-10-13 RX ADMIN — ALBUTEROL SULFATE 2 PUFF: 90 AEROSOL, METERED RESPIRATORY (INHALATION) at 05:20

## 2022-10-13 RX ADMIN — RIVAROXABAN 10 MG: 10 TABLET, FILM COATED ORAL at 17:36

## 2022-10-13 RX ADMIN — LEVOTHYROXINE SODIUM 50 MCG: 0.05 TABLET ORAL at 04:38

## 2022-10-13 RX ADMIN — DOCUSATE SODIUM 50 MG AND SENNOSIDES 8.6 MG 2 TABLET: 8.6; 5 TABLET, FILM COATED ORAL at 04:38

## 2022-10-13 RX ADMIN — ALBUTEROL SULFATE 2 PUFF: 90 AEROSOL, METERED RESPIRATORY (INHALATION) at 07:47

## 2022-10-13 ASSESSMENT — PAIN DESCRIPTION - PAIN TYPE
TYPE: ACUTE PAIN
TYPE: ACUTE PAIN

## 2022-10-13 NOTE — PROGRESS NOTES
Assumed care of patient at 0700 from Alva WARREN. Patient is A&O x1, states pain level is 0/10. Bed locked in lowest position with 2 rail up. Call light  in place, belongings at bedside. Hourly rounding is in place.

## 2022-10-13 NOTE — DISCHARGE PLANNING
Case Management Discharge Planning    Admission Date: 9/26/2022  GMLOS:    ALOS: 0    6-Clicks ADL Score: 17  6-Clicks Mobility Score: 17  PT and/or OT Eval ordered: Yes  Post-acute Referrals Ordered: Yes  Post-acute Choice Obtained: Yes  Has referral(s) been sent to post-acute provider:  Yes      Anticipated Discharge Dispo: Discharge Disposition: Still a Patient (30)    DME Needed: No    Action(s) Taken: OTHER    Escalations Completed: Provider    Medically Clear: Yes    Next Steps: Lsw called  owner Bill to see if he has an update from visiting pt at bedside last night. Lsw left .    Barriers to Discharge: Pending Placement w/ , and hospice order/choice once A/Ox4 again    Is the patient up for discharge tomorrow: No    Addendum:   Lsw spoke to Erasmo lin/ [facility] about pt. He is willing to accept her w/ hospice. He has several questions about BEAR and wants to have a working relationship / [facility]. Lsw provided immediate supervisor, Nuvia, contact information for BEAR questions.    LSw sent email to [facility] delia/ Gayathri LEON requesting a Medicaid screening.     Addendum:  [facility] director has spoken to  and has completed the BEAR for  at 4 months for $3500/month.    Lsw called  owner, and left . Lsw asked if we can set up transport for pt to go to  tomorrow, Friday.

## 2022-10-13 NOTE — PROGRESS NOTES
Bedside report completed with night nurse. Assumed pt care. Pt sleeping in bed. Bed alarm on. Bed in low and locked position. Call light within reach. Will continue to monitor.

## 2022-10-13 NOTE — CARE PLAN
The patient is Stable - Low risk of patient condition declining or worsening    Shift Goals  Clinical Goals: Safety, transfer room near nurses station,guardianship,  Patient Goals: Rest, discharge  Family Goals: n/a      Problem: Discharge Barriers/Planning  Goal: Patient's continuum of care needs are met  Outcome: Progressing     Problem: Respiratory  Goal: Patient will achieve/maintain optimum respiratory ventilation and gas exchange  Outcome: Progressing     Problem: Mobility  Goal: Patient's capacity to carry out activities will improve  Outcome: Progressing     Problem: Self Care  Goal: Patient will have the ability to perform ADLs independently or with assistance (bathe, groom, dress, toilet and feed)  Outcome: Progressing     Problem: Knowledge Deficit - Standard  Goal: Patient and family/care givers will demonstrate understanding of plan of care, disease process/condition, diagnostic tests and medications  Outcome: Progressing       Progress made toward(s) clinical / shift goals:  Patient is alert and oriented x 2. Not oriented to time and situation. Ambulating in the hallway, cooperating at this time. Transferred to room S522 via hospital bed assisted by RN and CNA. Bed alarm in placed, Bed in lowest position, Call light and personal items within reached. Kept safe and monitored.     Patient is not progressing towards the following goals:

## 2022-10-13 NOTE — CARE PLAN
The patient is Stable - Low risk of patient condition declining or worsening    Shift Goals  Clinical Goals: Safety, awaiting placement  Patient Goals: Rest  Family Goals: ALEX    Progress made toward(s) clinical / shift goals:      Problem: Discharge Barriers/Planning  Goal: Patient's continuum of care needs are met  Outcome: Progressing     Problem: Respiratory  Goal: Patient will achieve/maintain optimum respiratory ventilation and gas exchange  Outcome: Progressing     Problem: Mobility  Goal: Patient's capacity to carry out activities will improve  Outcome: Progressing     Problem: Self Care  Goal: Patient will have the ability to perform ADLs independently or with assistance (bathe, groom, dress, toilet and feed)  Outcome: Progressing     Problem: Knowledge Deficit - Standard  Goal: Patient and family/care givers will demonstrate understanding of plan of care, disease process/condition, diagnostic tests and medications  Outcome: Progressing

## 2022-10-13 NOTE — PROGRESS NOTES
Patient is alert and oriented x 2. Not oriented to time and situation. Ambulating in the hallway, cooperating at this time. Transferred to room S522 via hospital bed assisted by RN and CNA. Bed alarm in placed, Bed in lowest position, Call light and personal items within reached. Kept safe and monitored.

## 2022-10-13 NOTE — ASSESSMENT & PLAN NOTE
10/12: Complaining of headache.  Encouraged to drink more water and stay hydrated.  Tylenol as needed

## 2022-10-13 NOTE — DISCHARGE PLANNING
[facility] Director spoke to Erasmo Celestin, owner of [facility] [place] ([phone]).  [facility] Director explained the BEAR process.  Erasmo is agreeable to place pt in a shared room at $3500/month.  BEAR completed for 4 months and sent to Erasmo.  Pt is pending group home waiver.  Erasmo states he will work with unit LSW to coordinate transfer.

## 2022-10-14 PROBLEM — Z75.8 DISCHARGE PLANNING ISSUES: Status: ACTIVE | Noted: 2022-10-14

## 2022-10-14 PROBLEM — Z02.9 DISCHARGE PLANNING ISSUES: Status: ACTIVE | Noted: 2022-10-14

## 2022-10-14 PROBLEM — R51.9 HEADACHE: Status: RESOLVED | Noted: 2022-10-12 | Resolved: 2022-10-14

## 2022-10-14 PROBLEM — G93.40 ACUTE ENCEPHALOPATHY: Status: RESOLVED | Noted: 2022-09-27 | Resolved: 2022-10-14

## 2022-10-14 PROBLEM — N39.0 COMPLICATED UTI (URINARY TRACT INFECTION): Status: RESOLVED | Noted: 2022-09-27 | Resolved: 2022-10-14

## 2022-10-14 PROBLEM — E87.6 HYPOKALEMIA: Status: RESOLVED | Noted: 2022-09-27 | Resolved: 2022-10-14

## 2022-10-14 LAB
SARS-COV+SARS-COV-2 AG RESP QL IA.RAPID: NOTDETECTED
SPECIMEN SOURCE: NORMAL

## 2022-10-14 PROCEDURE — RXMED WILLOW AMBULATORY MEDICATION CHARGE: Performed by: STUDENT IN AN ORGANIZED HEALTH CARE EDUCATION/TRAINING PROGRAM

## 2022-10-14 PROCEDURE — 87426 SARSCOV CORONAVIRUS AG IA: CPT

## 2022-10-14 PROCEDURE — A9270 NON-COVERED ITEM OR SERVICE: HCPCS | Performed by: STUDENT IN AN ORGANIZED HEALTH CARE EDUCATION/TRAINING PROGRAM

## 2022-10-14 PROCEDURE — 99497 ADVNCD CARE PLAN 30 MIN: CPT | Performed by: STUDENT IN AN ORGANIZED HEALTH CARE EDUCATION/TRAINING PROGRAM

## 2022-10-14 PROCEDURE — 700102 HCHG RX REV CODE 250 W/ 637 OVERRIDE(OP): Performed by: STUDENT IN AN ORGANIZED HEALTH CARE EDUCATION/TRAINING PROGRAM

## 2022-10-14 PROCEDURE — G0378 HOSPITAL OBSERVATION PER HR: HCPCS

## 2022-10-14 PROCEDURE — A9270 NON-COVERED ITEM OR SERVICE: HCPCS | Performed by: NURSE PRACTITIONER

## 2022-10-14 PROCEDURE — 700102 HCHG RX REV CODE 250 W/ 637 OVERRIDE(OP): Performed by: NURSE PRACTITIONER

## 2022-10-14 PROCEDURE — 99224 PR SUBSEQUENT OBSERVATION CARE,LEVEL I: CPT | Mod: 25 | Performed by: STUDENT IN AN ORGANIZED HEALTH CARE EDUCATION/TRAINING PROGRAM

## 2022-10-14 RX ORDER — ALBUTEROL SULFATE 90 UG/1
2 AEROSOL, METERED RESPIRATORY (INHALATION) EVERY 4 HOURS PRN
Qty: 8.5 G | Refills: 0 | Status: SHIPPED | OUTPATIENT
Start: 2022-10-14 | End: 2022-12-31

## 2022-10-14 RX ORDER — CARBOXYMETHYLCELLULOSE SODIUM 5 MG/ML
1 SOLUTION/ DROPS OPHTHALMIC PRN
Qty: 15 ML | Refills: 0 | Status: SHIPPED | OUTPATIENT
Start: 2022-10-14 | End: 2022-11-18

## 2022-10-14 RX ORDER — LEVOTHYROXINE SODIUM 0.05 MG/1
50 TABLET ORAL
Qty: 30 TABLET | Refills: 0 | Status: SHIPPED | OUTPATIENT
Start: 2022-10-15 | End: 2022-10-18 | Stop reason: SDUPTHER

## 2022-10-14 RX ORDER — ACETAMINOPHEN 325 MG/1
650 TABLET ORAL EVERY 6 HOURS PRN
Qty: 30 TABLET | Refills: 0 | Status: SHIPPED | OUTPATIENT
Start: 2022-10-14 | End: 2022-12-31

## 2022-10-14 RX ORDER — LORATADINE 10 MG/1
10 TABLET ORAL
Qty: 30 TABLET | Refills: 0 | Status: SHIPPED | OUTPATIENT
Start: 2022-10-14 | End: 2022-12-31

## 2022-10-14 RX ADMIN — DOCUSATE SODIUM 50 MG AND SENNOSIDES 8.6 MG 2 TABLET: 8.6; 5 TABLET, FILM COATED ORAL at 18:00

## 2022-10-14 RX ADMIN — LEVOTHYROXINE SODIUM 50 MCG: 0.05 TABLET ORAL at 05:10

## 2022-10-14 RX ADMIN — ALBUTEROL SULFATE 2 PUFF: 90 AEROSOL, METERED RESPIRATORY (INHALATION) at 05:11

## 2022-10-14 RX ADMIN — DOCUSATE SODIUM 50 MG AND SENNOSIDES 8.6 MG 2 TABLET: 8.6; 5 TABLET, FILM COATED ORAL at 05:10

## 2022-10-14 RX ADMIN — ALBUTEROL SULFATE 2 PUFF: 90 AEROSOL, METERED RESPIRATORY (INHALATION) at 23:22

## 2022-10-14 RX ADMIN — RIVAROXABAN 10 MG: 10 TABLET, FILM COATED ORAL at 18:00

## 2022-10-14 RX ADMIN — ALBUTEROL SULFATE 2 PUFF: 90 AEROSOL, METERED RESPIRATORY (INHALATION) at 03:16

## 2022-10-14 RX ADMIN — ALBUTEROL SULFATE 2 PUFF: 90 AEROSOL, METERED RESPIRATORY (INHALATION) at 20:34

## 2022-10-14 ASSESSMENT — PAIN DESCRIPTION - PAIN TYPE: TYPE: ACUTE PAIN

## 2022-10-14 ASSESSMENT — ENCOUNTER SYMPTOMS
FEVER: 0
EYE DISCHARGE: 0
CHILLS: 0
NAUSEA: 0
PALPITATIONS: 0
EYE PAIN: 0
DIZZINESS: 0
HEMOPTYSIS: 0
DIARRHEA: 0
ABDOMINAL PAIN: 0
NECK PAIN: 0
COUGH: 0
HEADACHES: 0
INSOMNIA: 0
VOMITING: 0
NERVOUS/ANXIOUS: 0

## 2022-10-14 NOTE — CARE PLAN
The patient is Stable - Low risk of patient condition declining or worsening    Shift Goals  Clinical Goals: placement  Patient Goals: rest  Family Goals: ALEX    Progress made toward(s) clinical / shift goals:      Problem: Discharge Barriers/Planning  Goal: Patient's continuum of care needs are met  Outcome: Progressing  Pt planning to discharge to group home when available.      Problem: Knowledge Deficit - Standard  Goal: Patient and family/care givers will demonstrate understanding of plan of care, disease process/condition, diagnostic tests and medications  Outcome: Progressing   Pt updated at beginning of shift regarding plan for the night. Continued to update.    Patient is not progressing towards the following goals:

## 2022-10-14 NOTE — CARE PLAN
Problem: Discharge Barriers/Planning  Goal: Patient's continuum of care needs are met  Outcome: Progressing     Problem: Knowledge Deficit - Standard  Goal: Patient and family/care givers will demonstrate understanding of plan of care, disease process/condition, diagnostic tests and medications  Outcome: Progressing       The patient is Stable - Low risk of patient condition declining or worsening    Shift Goals  Clinical Goals: Monitor VS, placement and safety  Patient Goals: Rest  Family Goals: No family at bedisde at this time    Progress made toward(s) clinical / shift goals:  Continue working with  for discharge, Patient is reoriented as needed, bed alarm in place.    Patient is not progressing towards the following goals:       Suturegard Retention Suture: 2-0 Nylon

## 2022-10-14 NOTE — DISCHARGE PLANNING
Case Management Discharge Planning    Admission Date: 9/26/2022  GMLOS:    ALOS: 0    6-Clicks ADL Score: 17  6-Clicks Mobility Score: 17  PT and/or OT Eval ordered: NA  Post-acute Referrals Ordered: NA  Post-acute Choice Obtained: NA  Has referral(s) been sent to post-acute provider:  TIFFANIE      Anticipated Discharge Dispo: d/c to  once all necessary documents are received    DME Needed: No    Action(s) Taken: Updated Provider/Nurse on Discharge Plan    Escalations Completed: None    Medically Clear: Yes    Next Steps: Lsw completed 18 page admission packet for .    Lsw scanned and emailed to  owner as fax number for  was not working.     owner will come tonight to go over packet and get pt's signature.    Pt's dtr called and [facility] director advised Lsw provide the contact to .    Lsw provided dtr's contact info to  owner, Guero.      Barriers to Discharge: Pending Placement    Is the patient up for discharge tomorrow: No

## 2022-10-15 PROCEDURE — 700102 HCHG RX REV CODE 250 W/ 637 OVERRIDE(OP): Performed by: NURSE PRACTITIONER

## 2022-10-15 PROCEDURE — A9270 NON-COVERED ITEM OR SERVICE: HCPCS | Performed by: NURSE PRACTITIONER

## 2022-10-15 PROCEDURE — A9270 NON-COVERED ITEM OR SERVICE: HCPCS | Performed by: STUDENT IN AN ORGANIZED HEALTH CARE EDUCATION/TRAINING PROGRAM

## 2022-10-15 PROCEDURE — 700102 HCHG RX REV CODE 250 W/ 637 OVERRIDE(OP): Performed by: STUDENT IN AN ORGANIZED HEALTH CARE EDUCATION/TRAINING PROGRAM

## 2022-10-15 PROCEDURE — G0378 HOSPITAL OBSERVATION PER HR: HCPCS

## 2022-10-15 PROCEDURE — 700101 HCHG RX REV CODE 250: Performed by: STUDENT IN AN ORGANIZED HEALTH CARE EDUCATION/TRAINING PROGRAM

## 2022-10-15 RX ADMIN — ALBUTEROL SULFATE 2 PUFF: 90 AEROSOL, METERED RESPIRATORY (INHALATION) at 05:41

## 2022-10-15 RX ADMIN — IPRATROPIUM BROMIDE AND ALBUTEROL SULFATE 3 ML: 2.5; .5 SOLUTION RESPIRATORY (INHALATION) at 12:13

## 2022-10-15 RX ADMIN — ALBUTEROL SULFATE 2 PUFF: 90 AEROSOL, METERED RESPIRATORY (INHALATION) at 03:26

## 2022-10-15 RX ADMIN — IPRATROPIUM BROMIDE AND ALBUTEROL SULFATE 3 ML: 2.5; .5 SOLUTION RESPIRATORY (INHALATION) at 12:02

## 2022-10-15 RX ADMIN — ACETAMINOPHEN 650 MG: 325 TABLET, FILM COATED ORAL at 06:04

## 2022-10-15 RX ADMIN — DOCUSATE SODIUM 50 MG AND SENNOSIDES 8.6 MG 2 TABLET: 8.6; 5 TABLET, FILM COATED ORAL at 17:47

## 2022-10-15 RX ADMIN — ALBUTEROL SULFATE 2 PUFF: 90 AEROSOL, METERED RESPIRATORY (INHALATION) at 17:48

## 2022-10-15 RX ADMIN — LEVOTHYROXINE SODIUM 50 MCG: 0.05 TABLET ORAL at 05:41

## 2022-10-15 RX ADMIN — RIVAROXABAN 10 MG: 10 TABLET, FILM COATED ORAL at 17:47

## 2022-10-15 ASSESSMENT — PAIN DESCRIPTION - PAIN TYPE
TYPE: ACUTE PAIN

## 2022-10-15 ASSESSMENT — FIBROSIS 4 INDEX: FIB4 SCORE: 2.111111111111111111

## 2022-10-15 NOTE — CARE PLAN
Problem: Respiratory  Goal: Patient will achieve/maintain optimum respiratory ventilation and gas exchange  Outcome: Progressing     Problem: Mobility  Goal: Patient's capacity to carry out activities will improve  Outcome: Progressing       The patient is Stable - Low risk of patient condition declining or worsening    Shift Goals  Clinical Goals: Monitor VS, labs, comfort, safety and oxygen sats  Patient Goals: Breathing treatments and rest  Family Goals: No family at bedside at this time    Progress made toward(s) clinical / shift goals: Assess for oxygen needs and demands, using neb tx and inhaler as ordered. Continue to have bed alarm on, stand by assistance.    Patient is not progressing towards the following goals:
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
## 2022-10-15 NOTE — CARE PLAN
The patient is Stable - Low risk of patient condition declining or worsening    Shift Goals  Clinical Goals: monitor oxygen; safety  Patient Goals: comfort  Family Goals: N/A    Progress made toward(s) clinical / shift goals:    Problem: Knowledge Deficit - Standard  Goal: Patient and family/care givers will demonstrate understanding of plan of care, disease process/condition, diagnostic tests and medications  Outcome: Progressing     Problem: Respiratory  Goal: Patient will achieve/maintain optimum respiratory ventilation and gas exchange  Outcome: Progressing       Patient is not progressing towards the following goals:

## 2022-10-15 NOTE — ASSESSMENT & PLAN NOTE
"10/14: Bill from [facility] group home was ready to take the patient and arrived with papers to sign.  However, the patient refused to sign because she did not want to live with other people.  She expressed fears about leaving her brother people and being mistreated.  She had concerns about it \"not working out.\"  She was concerned about hard feelings if it did not.  I explained to the patient that I felt it was in her best interest to go to the group home because she is nearing an age where she needs more assistance with activities of daily living.  She stated that because of what happened in the past, she made a vow to take care of her self until the very end.  I told her it was my medical recommendation to go to the group home.  Patient was agreeable finally to at least check it out.  I discussed with the charge nurse about the possibility of a pass so that she could check at the group home.  I discussed the issues with Erasmo.    10/17: Discussed with Erasmo requesting QuantiFERON testing and COVID-19 before being discharged.  "

## 2022-10-16 PROCEDURE — 700102 HCHG RX REV CODE 250 W/ 637 OVERRIDE(OP): Performed by: STUDENT IN AN ORGANIZED HEALTH CARE EDUCATION/TRAINING PROGRAM

## 2022-10-16 PROCEDURE — A9270 NON-COVERED ITEM OR SERVICE: HCPCS | Performed by: STUDENT IN AN ORGANIZED HEALTH CARE EDUCATION/TRAINING PROGRAM

## 2022-10-16 PROCEDURE — A9270 NON-COVERED ITEM OR SERVICE: HCPCS | Performed by: NURSE PRACTITIONER

## 2022-10-16 PROCEDURE — G0378 HOSPITAL OBSERVATION PER HR: HCPCS

## 2022-10-16 PROCEDURE — 700102 HCHG RX REV CODE 250 W/ 637 OVERRIDE(OP): Performed by: NURSE PRACTITIONER

## 2022-10-16 RX ADMIN — ALBUTEROL SULFATE 2 PUFF: 90 AEROSOL, METERED RESPIRATORY (INHALATION) at 04:39

## 2022-10-16 RX ADMIN — LEVOTHYROXINE SODIUM 50 MCG: 0.05 TABLET ORAL at 04:39

## 2022-10-16 RX ADMIN — ALBUTEROL SULFATE 2 PUFF: 90 AEROSOL, METERED RESPIRATORY (INHALATION) at 14:02

## 2022-10-16 RX ADMIN — RIVAROXABAN 10 MG: 10 TABLET, FILM COATED ORAL at 16:54

## 2022-10-16 RX ADMIN — ALBUTEROL SULFATE 2 PUFF: 90 AEROSOL, METERED RESPIRATORY (INHALATION) at 00:32

## 2022-10-16 RX ADMIN — DOCUSATE SODIUM 50 MG AND SENNOSIDES 8.6 MG 2 TABLET: 8.6; 5 TABLET, FILM COATED ORAL at 16:54

## 2022-10-16 ASSESSMENT — PATIENT HEALTH QUESTIONNAIRE - PHQ9
1. LITTLE INTEREST OR PLEASURE IN DOING THINGS: NOT AT ALL
2. FEELING DOWN, DEPRESSED, IRRITABLE, OR HOPELESS: NOT AT ALL
SUM OF ALL RESPONSES TO PHQ9 QUESTIONS 1 AND 2: 0

## 2022-10-16 ASSESSMENT — PAIN DESCRIPTION - PAIN TYPE
TYPE: ACUTE PAIN

## 2022-10-16 NOTE — DISCHARGE PLANNING
Case Management Discharge Planning    Admission Date: 9/26/2022  GMLOS:    ALOS: 0    6-Clicks ADL Score: 17  6-Clicks Mobility Score: 17  PT and/or OT Eval ordered: Yes  Post-acute Referrals Ordered: No  Post-acute Choice Obtained: Yes  Has referral(s) been sent to post-acute provider:  Yes      Anticipated Discharge Dispo: Discharge Disposition: Still a Patient (30)    DME Needed: No    Action(s) Taken: Updated Provider/Nurse on Discharge Plan    Escalations Completed: Provider and Pending Discharge Destination    Medically Clear: Yes    Next Steps: Rec'd call from Tamika Peck, charge nurse for S 5 at the request of Dr Wang. Pt is a [facility] pt. Tamika states pt's orientation waxes and wanes and pt cannot take care of herself. Pt refused to go to the group home that did an eval on her on Friday because she does not want a roommate. Since that time, pt now agreeable to the group home but wants to go tour it before she decides. Dr Wang is asking Tamika to call CM to see if that is possible.This CM does not know of any such “Day Pass” allowable for pts. CM suggested Dr Wang review on Monday with his superior Dr Soria.     Barriers to Discharge: Pending Placement and Refusing treatment(s)    Is the patient up for discharge tomorrow: No

## 2022-10-16 NOTE — CARE PLAN
The patient is Stable - Low risk of patient condition declining or worsening    Shift Goals  Clinical Goals: monitor oxygen; comfort  Patient Goals: monitor oxygen; PRN inhalers  Family Goals: N/A    Progress made toward(s) clinical / shift goals:    Problem: Mobility  Goal: Patient's capacity to carry out activities will improve  Outcome: Progressing     Problem: Self Care  Goal: Patient will have the ability to perform ADLs independently or with assistance (bathe, groom, dress, toilet and feed)  Outcome: Progressing       Patient is not progressing towards the following goals:

## 2022-10-17 PROBLEM — Z66 DNR (DO NOT RESUSCITATE): Status: ACTIVE | Noted: 2022-10-17

## 2022-10-17 PROCEDURE — 700102 HCHG RX REV CODE 250 W/ 637 OVERRIDE(OP): Performed by: NURSE PRACTITIONER

## 2022-10-17 PROCEDURE — 700102 HCHG RX REV CODE 250 W/ 637 OVERRIDE(OP): Performed by: STUDENT IN AN ORGANIZED HEALTH CARE EDUCATION/TRAINING PROGRAM

## 2022-10-17 PROCEDURE — 36415 COLL VENOUS BLD VENIPUNCTURE: CPT

## 2022-10-17 PROCEDURE — A9270 NON-COVERED ITEM OR SERVICE: HCPCS | Performed by: NURSE PRACTITIONER

## 2022-10-17 PROCEDURE — 99224 PR SUBSEQUENT OBSERVATION CARE,LEVEL I: CPT | Performed by: STUDENT IN AN ORGANIZED HEALTH CARE EDUCATION/TRAINING PROGRAM

## 2022-10-17 PROCEDURE — 86480 TB TEST CELL IMMUN MEASURE: CPT

## 2022-10-17 PROCEDURE — A9270 NON-COVERED ITEM OR SERVICE: HCPCS | Performed by: STUDENT IN AN ORGANIZED HEALTH CARE EDUCATION/TRAINING PROGRAM

## 2022-10-17 PROCEDURE — G0378 HOSPITAL OBSERVATION PER HR: HCPCS

## 2022-10-17 RX ADMIN — ALBUTEROL SULFATE 2 PUFF: 90 AEROSOL, METERED RESPIRATORY (INHALATION) at 21:29

## 2022-10-17 RX ADMIN — LEVOTHYROXINE SODIUM 50 MCG: 0.05 TABLET ORAL at 06:20

## 2022-10-17 RX ADMIN — ALBUTEROL SULFATE 2 PUFF: 90 AEROSOL, METERED RESPIRATORY (INHALATION) at 02:50

## 2022-10-17 RX ADMIN — RIVAROXABAN 10 MG: 10 TABLET, FILM COATED ORAL at 17:02

## 2022-10-17 ASSESSMENT — ENCOUNTER SYMPTOMS
EYE PAIN: 0
HEMOPTYSIS: 0
DIZZINESS: 0
PALPITATIONS: 0
NECK PAIN: 0
HEADACHES: 0
NAUSEA: 0
CHILLS: 0
EYE DISCHARGE: 0
DIARRHEA: 0
COUGH: 0
VOMITING: 0
FEVER: 0
NERVOUS/ANXIOUS: 0
ABDOMINAL PAIN: 0
INSOMNIA: 0

## 2022-10-17 ASSESSMENT — PAIN DESCRIPTION - PAIN TYPE: TYPE: ACUTE PAIN

## 2022-10-17 NOTE — CARE PLAN
Problem: Discharge Barriers/Planning  Goal: Patient's continuum of care needs are met  Outcome: Progressing     Problem: Knowledge Deficit - Standard  Goal: Patient and family/care givers will demonstrate understanding of plan of care, disease process/condition, diagnostic tests and medications  Outcome: Progressing     The patient is Stable - Low risk of patient condition declining or worsening    Shift Goals  Clinical Goals: Monitor labs, VS, safety and comfort  Patient Goals: Rest  Family Goals: No family at bedside at this time    Progress made toward(s) clinical / shift goals: Monitor and discuss with patient plan of care, reorient as needed.    Patient is not progressing towards the following goals:

## 2022-10-17 NOTE — PROGRESS NOTES
Recieved patient from night RN at 0700. Patient is A&O x 3. Stated pain level is 0/10. Bed in lowest position with bed rails up. Call light within reach. Patient belongings near patient. Patient calling for tissues, no additional needs at this time. Hourly rounding in place.

## 2022-10-17 NOTE — DISCHARGE PLANNING
Case Management Discharge Planning    Admission Date: 9/26/2022  GMLOS:    ALOS: 0    6-Clicks ADL Score: 17  6-Clicks Mobility Score: 17  PT and/or OT Eval ordered: Yes  Post-acute Referrals Ordered: Yes  Post-acute Choice Obtained: Yes  Has referral(s) been sent to post-acute provider:  Yes      Anticipated Discharge Dispo: Discharge Disposition: D/T to facility providing [facility]/supportive care (04)    DME Needed: No    Action(s) Taken: Updated Provider/Nurse on Discharge Plan    Escalations Completed: None    Medically Clear: Yes    Next Steps: Lsw spoke to medical team.    Lsw spoke to  owner. He received CV/Quant, admission application, and BEAR.    LSw acquired MD assessment and scanned/emailed to  owner.    Charge has located pt's son's gson. He spoke to pt via phone. He advised the  is best d/c. Pt is considering going back to [facility]. Pt is concerned about not having all her money to herself. Medical team explaining how much care she will receive based on the cost.    Charge will place good gson's information as contact for pt as she will speak w/ him. [place] and difficult to get through to his phone.     owner, LSW, and gson will hopefully be available on Tues between 8a-9a to explain to pt about signing the admission docs.        Barriers to Discharge: Pending Placement    Is the patient up for discharge tomorrow: No

## 2022-10-17 NOTE — CARE PLAN
The patient is Stable - Low risk of patient condition declining or worsening    Shift Goals  Clinical Goals: comfort; safety  Patient Goals: comfort; mobility  Family Goals: N/A    Progress made toward(s) clinical / shift goals:    Problem: Respiratory  Goal: Patient will achieve/maintain optimum respiratory ventilation and gas exchange  Outcome: Progressing     Problem: Mobility  Goal: Patient's capacity to carry out activities will improve  Outcome: Progressing       Patient is not progressing towards the following goals:

## 2022-10-17 NOTE — CARE PLAN
The patient is Stable - Low risk of patient condition declining or worsening    Shift Goals  Clinical Goals: safety, work toward DC  Patient Goals: have a good day  Family Goals: N/A    Progress made toward(s) clinical / shift goals:     Problem: Discharge Barriers/Planning  Goal: Patient's continuum of care needs are met  Outcome: Progressing     Problem: Knowledge Deficit - Standard  Goal: Patient and family/care givers will demonstrate understanding of plan of care, disease process/condition, diagnostic tests and medications  Outcome: Progressing       Patient is not progressing towards the following goals:

## 2022-10-18 LAB
GAMMA INTERFERON BACKGROUND BLD IA-ACNC: 0.05 IU/ML
M TB IFN-G BLD-IMP: NEGATIVE
M TB IFN-G CD4+ BCKGRND COR BLD-ACNC: -0.01 IU/ML
MITOGEN IGNF BCKGRD COR BLD-ACNC: >10 IU/ML
QFT TB2 - NIL TBQ2: -0.01 IU/ML

## 2022-10-18 PROCEDURE — 700102 HCHG RX REV CODE 250 W/ 637 OVERRIDE(OP): Performed by: NURSE PRACTITIONER

## 2022-10-18 PROCEDURE — 700102 HCHG RX REV CODE 250 W/ 637 OVERRIDE(OP): Performed by: STUDENT IN AN ORGANIZED HEALTH CARE EDUCATION/TRAINING PROGRAM

## 2022-10-18 PROCEDURE — A9270 NON-COVERED ITEM OR SERVICE: HCPCS | Performed by: NURSE PRACTITIONER

## 2022-10-18 PROCEDURE — RXMED WILLOW AMBULATORY MEDICATION CHARGE: Performed by: INTERNAL MEDICINE

## 2022-10-18 PROCEDURE — A9270 NON-COVERED ITEM OR SERVICE: HCPCS | Performed by: STUDENT IN AN ORGANIZED HEALTH CARE EDUCATION/TRAINING PROGRAM

## 2022-10-18 PROCEDURE — G0378 HOSPITAL OBSERVATION PER HR: HCPCS

## 2022-10-18 RX ORDER — ACETAMINOPHEN 325 MG/1
650 TABLET ORAL EVERY 6 HOURS PRN
Qty: 30 TABLET | Refills: 0 | Status: SHIPPED | OUTPATIENT
Start: 2022-10-18 | End: 2022-10-19

## 2022-10-18 RX ORDER — LEVOTHYROXINE SODIUM 0.05 MG/1
50 TABLET ORAL
Qty: 90 TABLET | Refills: 3 | Status: SHIPPED | OUTPATIENT
Start: 2022-10-18 | End: 2022-12-31

## 2022-10-18 RX ORDER — CARBOXYMETHYLCELLULOSE SODIUM 5 MG/ML
1 SOLUTION/ DROPS OPHTHALMIC PRN
Qty: 15 ML | Refills: 0 | Status: SHIPPED | OUTPATIENT
Start: 2022-10-18 | End: 2022-10-19

## 2022-10-18 RX ORDER — LORATADINE 10 MG/1
10 TABLET ORAL
Qty: 30 TABLET | Refills: 0 | Status: SHIPPED | OUTPATIENT
Start: 2022-10-18 | End: 2022-10-19

## 2022-10-18 RX ORDER — ALBUTEROL SULFATE 90 UG/1
2 AEROSOL, METERED RESPIRATORY (INHALATION) EVERY 4 HOURS PRN
Qty: 8.5 G | Refills: 0 | Status: SHIPPED | OUTPATIENT
Start: 2022-10-18 | End: 2022-10-19

## 2022-10-18 RX ADMIN — ALBUTEROL SULFATE 2 PUFF: 90 AEROSOL, METERED RESPIRATORY (INHALATION) at 06:14

## 2022-10-18 RX ADMIN — LEVOTHYROXINE SODIUM 50 MCG: 0.05 TABLET ORAL at 06:14

## 2022-10-18 RX ADMIN — RIVAROXABAN 10 MG: 10 TABLET, FILM COATED ORAL at 18:31

## 2022-10-18 NOTE — DISCHARGE PLANNING
Case Management Discharge Planning    Admission Date: 9/26/2022  GMLOS:    ALOS: 0    6-Clicks ADL Score: 17  6-Clicks Mobility Score: 17  PT and/or OT Eval ordered: Yes  Post-acute Referrals Ordered: No  Post-acute Choice Obtained: No  Has referral(s) been sent to post-acute provider:  No      Anticipated Discharge Dispo: Discharge Disposition: D/T to facility providing jail/supportive care (04)    DME Needed: No    Action(s) Taken: Updated Provider/Nurse on Discharge Plan    Escalations Completed: Long Length of Stay Committee     Medically Clear: Yes    Next Steps: follow up with patient, provide transport to group home or to shelter    Barriers to Discharge: Pending Placement    Is the patient up for discharge tomorrow: No  SW spoke to patient with group home owner, Bill.  Patient is hard of hearing, aware where she is and reason for admit.  Patient has changed her mind and no longer wants to go to a group home.  Telling SW that she does not want to spend all of her money, and can find an apartment for $600.00.  SW asked where patient can find one at this price, patient stated I will find one. Patient then stated she has a friend that comes in daily, and will ask her to help find a place to stay.  SW stated that patient can not stay at the hospital any longer as she no longer has a medical need to be here.  Patient stated she understood this, and is aware that she will have to go to the shelter if she does go to the group home.  SW stated yes, that she will be discharged tomorrow morning no later than 11:00 to either the group home or the shelter.    Patient is able to make her decisions and is aware that she can go to a group home.  At this times she is not wanting to spend her money towards the cost of care.  Saying she can find something cheaper, and thinks that giving up all of her money for a bed and meals is right.

## 2022-10-18 NOTE — PROGRESS NOTES
Recieved patient from night RN at 0700. Patient is A&O x 3. Stated pain level is 0/10. Bed in lowest position with bed rails up. Call light within reach. Patient belongings near patient. Patient expresses no needs at this time. Hourly rounding in place. Plan to DC to [facility] today.

## 2022-10-18 NOTE — CARE PLAN
The patient is Stable - Low risk of patient condition declining or worsening    Shift Goals  Clinical Goals: go to group home  Patient Goals: leave  Family Goals: N/A    Progress made toward(s) clinical / shift goals:      Problem: Discharge Barriers/Planning  Goal: Patient's continuum of care needs are met  Outcome: Progressing     Problem: Mobility  Goal: Patient's capacity to carry out activities will improve  Outcome: Progressing    Patient is not progressing towards the following goals:    Problem: Knowledge Deficit - Standard  Goal: Patient and family/care givers will demonstrate understanding of plan of care, disease process/condition, diagnostic tests and medications  Outcome: Not Progressing  Patient not decided she wants to go to the shelter or the hotel and not the group home.
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
## 2022-10-18 NOTE — CARE PLAN
The patient is Stable - Low risk of patient condition declining or worsening    Shift Goals  Clinical Goals: safety, work toward DC  Patient Goals: have a good day  Family Goals: N/A    Progress made toward(s) clinical / shift goals:    Problem: Knowledge Deficit - Standard  Goal: Patient and family/care givers will demonstrate understanding of plan of care, disease process/condition, diagnostic tests and medications  Outcome: Progressing   The patient is educated on her plan of care, medications, treatments, and questions answered, pt has sever dementia so much of what she is educated on she does not retain.   Problem: Respiratory  Goal: Patient will achieve/maintain optimum respiratory ventilation and gas exchange  Outcome: Progressing   The patient is on room air with normal saturations   Problem: Mobility  Goal: Patient's capacity to carry out activities will improve  Outcome: Progressing   The patient mobilizes well without DME assistance, staff is present when she does mobilize to prevent falls.     Patient is not progressing towards the following goals:

## 2022-10-19 ENCOUNTER — PHARMACY VISIT (OUTPATIENT)
Dept: PHARMACY | Facility: MEDICAL CENTER | Age: 87
End: 2022-10-19
Payer: COMMERCIAL

## 2022-10-19 PROCEDURE — G0378 HOSPITAL OBSERVATION PER HR: HCPCS

## 2022-10-19 PROCEDURE — 700102 HCHG RX REV CODE 250 W/ 637 OVERRIDE(OP): Performed by: STUDENT IN AN ORGANIZED HEALTH CARE EDUCATION/TRAINING PROGRAM

## 2022-10-19 PROCEDURE — A9270 NON-COVERED ITEM OR SERVICE: HCPCS | Performed by: STUDENT IN AN ORGANIZED HEALTH CARE EDUCATION/TRAINING PROGRAM

## 2022-10-19 PROCEDURE — A9270 NON-COVERED ITEM OR SERVICE: HCPCS | Performed by: NURSE PRACTITIONER

## 2022-10-19 PROCEDURE — 700102 HCHG RX REV CODE 250 W/ 637 OVERRIDE(OP): Performed by: NURSE PRACTITIONER

## 2022-10-19 RX ADMIN — LEVOTHYROXINE SODIUM 50 MCG: 0.05 TABLET ORAL at 04:33

## 2022-10-19 RX ADMIN — RIVAROXABAN 10 MG: 10 TABLET, FILM COATED ORAL at 17:16
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
## 2022-10-19 NOTE — PROGRESS NOTES
Recieved patient from night RN at 0700. Patient is A&O x 4. Stated pain level is 0/10. Bed in lowest position with bed rails up. Call light within reach. Patient belongings near patient. Patient expresses no needs at this time. Hourly rounding in place.

## 2022-10-19 NOTE — DISCHARGE PLANNING
Medical Social Work  Patient does not want to go to the group home.  Again saying she is not giving her money as she can find something cheaper.   ANGEL stated that patient will be discharged to shelter today.     ANGEL Velasquezed DEBBIE with an update, ANGEL will provide cab voucher to patient to the shelter

## 2022-10-19 NOTE — CARE PLAN
The patient is Stable - Low risk of patient condition declining or worsening    Shift Goals  Clinical Goals: comfort; monitor breathing  Patient Goals: comfort; inhalers  Family Goals: N/A    Progress made toward(s) clinical / shift goals:    Problem: Respiratory  Goal: Patient will achieve/maintain optimum respiratory ventilation and gas exchange  Outcome: Progressing     Problem: Mobility  Goal: Patient's capacity to carry out activities will improve  Outcome: Progressing       Patient is not progressing towards the following goals:

## 2022-10-19 NOTE — CARE PLAN
The patient is Stable - Low risk of patient condition declining or worsening    Shift Goals  Clinical Goals: discharge  Patient Goals: discharge  Family Goals: N/A    Progress made toward(s) clinical / shift goals:      Problem: Mobility  Goal: Patient's capacity to carry out activities will improve  Outcome: Progressing     Problem: Self Care  Goal: Patient will have the ability to perform ADLs independently or with assistance (bathe, groom, dress, toilet and feed)  Outcome: Progressing     Patient is not progressing towards the following goals:    Problem: Knowledge Deficit - Standard  Goal: Patient and family/care givers will demonstrate understanding of plan of care, disease process/condition, diagnostic tests and medications  Outcome: Not Progressing

## 2022-10-20 VITALS
HEIGHT: 60 IN | SYSTOLIC BLOOD PRESSURE: 130 MMHG | DIASTOLIC BLOOD PRESSURE: 68 MMHG | RESPIRATION RATE: 20 BRPM | OXYGEN SATURATION: 90 % | BODY MASS INDEX: 24.19 KG/M2 | HEART RATE: 69 BPM | TEMPERATURE: 97.8 F | WEIGHT: 123.24 LBS

## 2022-10-20 PROCEDURE — G0378 HOSPITAL OBSERVATION PER HR: HCPCS

## 2022-10-20 PROCEDURE — A9270 NON-COVERED ITEM OR SERVICE: HCPCS | Performed by: STUDENT IN AN ORGANIZED HEALTH CARE EDUCATION/TRAINING PROGRAM

## 2022-10-20 PROCEDURE — 99217 PR OBSERVATION CARE DISCHARGE: CPT | Performed by: STUDENT IN AN ORGANIZED HEALTH CARE EDUCATION/TRAINING PROGRAM

## 2022-10-20 PROCEDURE — 700102 HCHG RX REV CODE 250 W/ 637 OVERRIDE(OP): Performed by: STUDENT IN AN ORGANIZED HEALTH CARE EDUCATION/TRAINING PROGRAM

## 2022-10-20 RX ADMIN — ALBUTEROL SULFATE 2 PUFF: 90 AEROSOL, METERED RESPIRATORY (INHALATION) at 07:55

## 2022-10-20 RX ADMIN — LEVOTHYROXINE SODIUM 50 MCG: 0.05 TABLET ORAL at 04:35

## 2022-10-20 NOTE — DISCHARGE PLANNING
"Medical Social Work  PC to patient's son, Gilbert [phone]: SW explained purpose of the call.  Gilbert stated he lives in [place] and is not an option to help with his mother.  Suggested SW contact other daughter Tracie [phone]    PC to Tracie [phone], SW explained purpose of the call.  Tracie stated she has had minimal involvement with the patient, last contact was about 10 years ago.  Patient was living with other sister Joann Leblanc in [place]. Believes they had a following out, and patient was asked to leave.  Tracie stated that Joann has taken off and does not know where she is.  Tracie stated that her mother was not a good parent, \" I ran away when I was 17.\"  Described her as being mean.    Narayan Funez, 71, haven't seen him in a very long time  Andria Jensen, 69, I haven't heard from her since 2013.  Gilbert, 67  Tracie 65  Joann Ignacia 63    Tracie told SW that she does not want anything to do with patient, and doubts any of her siblings will.    ANGEL informed patient is okay to discharge.  Cab Voucher provided to [facility]  # [id number]    "
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
## 2022-10-20 NOTE — CARE PLAN
The patient is Stable - Low risk of patient condition declining or worsening    Shift Goals  Clinical Goals: safety; comfort  Patient Goals: sleep  Family Goals: N/A    Progress made toward(s) clinical / shift goals:    Problem: Respiratory  Goal: Patient will achieve/maintain optimum respiratory ventilation and gas exchange  Outcome: Progressing     Problem: Mobility  Goal: Patient's capacity to carry out activities will improve  Outcome: Progressing       Patient is not progressing towards the following goals:

## 2022-10-20 NOTE — PROGRESS NOTES
Received report from KELVIN Ferro. Assumed care at 0715. This pt is A&O x2 . Patient and RN discussed plan of care, all questions answered. Labs noted. Call light in place, personal belongings available bed in lowest position, 3 bedrails up, bed alarm on, patient educated on importance of calling for assistance, hourly rounding in place. No additional needs at this time. VSS

## 2022-12-28 ENCOUNTER — HOSPITAL ENCOUNTER (INPATIENT)
Facility: MEDICAL CENTER | Age: 87
LOS: 2 days | DRG: 871 | End: 2022-12-30
Attending: EMERGENCY MEDICINE | Admitting: STUDENT IN AN ORGANIZED HEALTH CARE EDUCATION/TRAINING PROGRAM
Payer: MEDICARE

## 2022-12-28 ENCOUNTER — APPOINTMENT (OUTPATIENT)
Dept: RADIOLOGY | Facility: MEDICAL CENTER | Age: 87
DRG: 871 | End: 2022-12-28
Attending: EMERGENCY MEDICINE
Payer: MEDICARE

## 2022-12-28 DIAGNOSIS — J18.9 PNEUMONIA OF RIGHT LUNG DUE TO INFECTIOUS ORGANISM, UNSPECIFIED PART OF LUNG: ICD-10-CM

## 2022-12-28 PROBLEM — N39.0 UTI (URINARY TRACT INFECTION): Status: ACTIVE | Noted: 2022-12-28

## 2022-12-28 PROBLEM — A41.9 SEPSIS (HCC): Status: ACTIVE | Noted: 2022-12-28

## 2022-12-28 PROBLEM — Z66 DNR (DO NOT RESUSCITATE): Status: ACTIVE | Noted: 2022-12-28

## 2022-12-28 PROBLEM — E87.20 LACTIC ACID ACIDOSIS: Status: ACTIVE | Noted: 2022-12-28

## 2022-12-28 PROBLEM — J96.01 ACUTE RESPIRATORY FAILURE WITH HYPOXIA (HCC): Status: ACTIVE | Noted: 2022-12-28

## 2022-12-28 LAB
ALBUMIN SERPL BCP-MCNC: 3.2 G/DL (ref 3.2–4.9)
ALBUMIN/GLOB SERPL: 1 G/DL
ALP SERPL-CCNC: 91 U/L (ref 30–99)
ALT SERPL-CCNC: 17 U/L (ref 2–50)
ANION GAP SERPL CALC-SCNC: 13 MMOL/L (ref 7–16)
APPEARANCE UR: ABNORMAL
AST SERPL-CCNC: 37 U/L (ref 12–45)
BACTERIA #/AREA URNS HPF: ABNORMAL /HPF
BASOPHILS # BLD AUTO: 0.5 % (ref 0–1.8)
BASOPHILS # BLD: 0.07 K/UL (ref 0–0.12)
BILIRUB SERPL-MCNC: 2.3 MG/DL (ref 0.1–1.5)
BILIRUB UR QL STRIP.AUTO: NEGATIVE
BUN SERPL-MCNC: 36 MG/DL (ref 8–22)
CALCIUM ALBUM COR SERPL-MCNC: 9.1 MG/DL (ref 8.5–10.5)
CALCIUM SERPL-MCNC: 8.5 MG/DL (ref 8.5–10.5)
CHLORIDE SERPL-SCNC: 95 MMOL/L (ref 96–112)
CO2 SERPL-SCNC: 22 MMOL/L (ref 20–33)
COLOR UR: ABNORMAL
CREAT SERPL-MCNC: 0.89 MG/DL (ref 0.5–1.4)
EKG IMPRESSION: NORMAL
EOSINOPHIL # BLD AUTO: 0 K/UL (ref 0–0.51)
EOSINOPHIL NFR BLD: 0 % (ref 0–6.9)
EPI CELLS #/AREA URNS HPF: ABNORMAL /HPF
ERYTHROCYTE [DISTWIDTH] IN BLOOD BY AUTOMATED COUNT: 43.8 FL (ref 35.9–50)
FLUAV RNA SPEC QL NAA+PROBE: NEGATIVE
FLUBV RNA SPEC QL NAA+PROBE: NEGATIVE
GFR SERPLBLD CREATININE-BSD FMLA CKD-EPI: 62 ML/MIN/1.73 M 2
GLOBULIN SER CALC-MCNC: 3.2 G/DL (ref 1.9–3.5)
GLUCOSE SERPL-MCNC: 106 MG/DL (ref 65–99)
GLUCOSE UR STRIP.AUTO-MCNC: NEGATIVE MG/DL
HCT VFR BLD AUTO: 39.7 % (ref 37–47)
HGB BLD-MCNC: 13.5 G/DL (ref 12–16)
HYALINE CASTS #/AREA URNS LPF: ABNORMAL /LPF
IMM GRANULOCYTES # BLD AUTO: 0.14 K/UL (ref 0–0.11)
IMM GRANULOCYTES NFR BLD AUTO: 1 % (ref 0–0.9)
KETONES UR STRIP.AUTO-MCNC: 15 MG/DL
LACTATE SERPL-SCNC: 1.6 MMOL/L (ref 0.5–2)
LACTATE SERPL-SCNC: 2.1 MMOL/L (ref 0.5–2)
LACTATE SERPL-SCNC: 2.5 MMOL/L (ref 0.5–2)
LEUKOCYTE ESTERASE UR QL STRIP.AUTO: ABNORMAL
LYMPHOCYTES # BLD AUTO: 0.25 K/UL (ref 1–4.8)
LYMPHOCYTES NFR BLD: 1.8 % (ref 22–41)
MCH RBC QN AUTO: 30.9 PG (ref 27–33)
MCHC RBC AUTO-ENTMCNC: 34 G/DL (ref 33.6–35)
MCV RBC AUTO: 90.8 FL (ref 81.4–97.8)
MICRO URNS: ABNORMAL
MONOCYTES # BLD AUTO: 0.68 K/UL (ref 0–0.85)
MONOCYTES NFR BLD AUTO: 4.9 % (ref 0–13.4)
NEUTROPHILS # BLD AUTO: 12.68 K/UL (ref 2–7.15)
NEUTROPHILS NFR BLD: 91.8 % (ref 44–72)
NITRITE UR QL STRIP.AUTO: NEGATIVE
NRBC # BLD AUTO: 0 K/UL
NRBC BLD-RTO: 0 /100 WBC
PH UR STRIP.AUTO: 5 [PH] (ref 5–8)
PLATELET # BLD AUTO: 342 K/UL (ref 164–446)
PMV BLD AUTO: 10 FL (ref 9–12.9)
POTASSIUM SERPL-SCNC: 3.7 MMOL/L (ref 3.6–5.5)
PROT SERPL-MCNC: 6.4 G/DL (ref 6–8.2)
PROT UR QL STRIP: 30 MG/DL
RBC # BLD AUTO: 4.37 M/UL (ref 4.2–5.4)
RBC # URNS HPF: ABNORMAL /HPF
RBC UR QL AUTO: NEGATIVE
RSV RNA SPEC QL NAA+PROBE: NEGATIVE
SARS-COV-2 RNA RESP QL NAA+PROBE: NOTDETECTED
SODIUM SERPL-SCNC: 130 MMOL/L (ref 135–145)
SP GR UR STRIP.AUTO: 1.02
SPECIMEN SOURCE: NORMAL
UROBILINOGEN UR STRIP.AUTO-MCNC: 1 MG/DL
WBC # BLD AUTO: 13.8 K/UL (ref 4.8–10.8)
WBC #/AREA URNS HPF: ABNORMAL /HPF

## 2022-12-28 PROCEDURE — 87040 BLOOD CULTURE FOR BACTERIA: CPT | Mod: 91

## 2022-12-28 PROCEDURE — 99285 EMERGENCY DEPT VISIT HI MDM: CPT

## 2022-12-28 PROCEDURE — 99223 1ST HOSP IP/OBS HIGH 75: CPT | Performed by: STUDENT IN AN ORGANIZED HEALTH CARE EDUCATION/TRAINING PROGRAM

## 2022-12-28 PROCEDURE — 770000 HCHG ROOM/CARE - INTERMEDIATE ICU *

## 2022-12-28 PROCEDURE — 700111 HCHG RX REV CODE 636 W/ 250 OVERRIDE (IP): Performed by: STUDENT IN AN ORGANIZED HEALTH CARE EDUCATION/TRAINING PROGRAM

## 2022-12-28 PROCEDURE — 700102 HCHG RX REV CODE 250 W/ 637 OVERRIDE(OP): Performed by: EMERGENCY MEDICINE

## 2022-12-28 PROCEDURE — 700102 HCHG RX REV CODE 250 W/ 637 OVERRIDE(OP): Performed by: STUDENT IN AN ORGANIZED HEALTH CARE EDUCATION/TRAINING PROGRAM

## 2022-12-28 PROCEDURE — 36415 COLL VENOUS BLD VENIPUNCTURE: CPT

## 2022-12-28 PROCEDURE — 0241U HCHG SARS-COV-2 COVID-19 NFCT DS RESP RNA 4 TRGT MIC: CPT

## 2022-12-28 PROCEDURE — A9270 NON-COVERED ITEM OR SERVICE: HCPCS | Performed by: STUDENT IN AN ORGANIZED HEALTH CARE EDUCATION/TRAINING PROGRAM

## 2022-12-28 PROCEDURE — 85025 COMPLETE CBC W/AUTO DIFF WBC: CPT

## 2022-12-28 PROCEDURE — 81001 URINALYSIS AUTO W/SCOPE: CPT

## 2022-12-28 PROCEDURE — 71045 X-RAY EXAM CHEST 1 VIEW: CPT

## 2022-12-28 PROCEDURE — 700111 HCHG RX REV CODE 636 W/ 250 OVERRIDE (IP): Performed by: EMERGENCY MEDICINE

## 2022-12-28 PROCEDURE — 700105 HCHG RX REV CODE 258: Performed by: STUDENT IN AN ORGANIZED HEALTH CARE EDUCATION/TRAINING PROGRAM

## 2022-12-28 PROCEDURE — 83605 ASSAY OF LACTIC ACID: CPT | Mod: 91

## 2022-12-28 PROCEDURE — 700101 HCHG RX REV CODE 250: Performed by: GENERAL PRACTICE

## 2022-12-28 PROCEDURE — 87077 CULTURE AEROBIC IDENTIFY: CPT

## 2022-12-28 PROCEDURE — 87086 URINE CULTURE/COLONY COUNT: CPT

## 2022-12-28 PROCEDURE — 93005 ELECTROCARDIOGRAM TRACING: CPT | Performed by: EMERGENCY MEDICINE

## 2022-12-28 PROCEDURE — 94640 AIRWAY INHALATION TREATMENT: CPT

## 2022-12-28 PROCEDURE — 87186 SC STD MICRODIL/AGAR DIL: CPT

## 2022-12-28 PROCEDURE — A9270 NON-COVERED ITEM OR SERVICE: HCPCS | Performed by: GENERAL PRACTICE

## 2022-12-28 PROCEDURE — A9270 NON-COVERED ITEM OR SERVICE: HCPCS | Performed by: EMERGENCY MEDICINE

## 2022-12-28 PROCEDURE — 80053 COMPREHEN METABOLIC PANEL: CPT

## 2022-12-28 PROCEDURE — 700102 HCHG RX REV CODE 250 W/ 637 OVERRIDE(OP): Performed by: GENERAL PRACTICE

## 2022-12-28 PROCEDURE — 96368 THER/DIAG CONCURRENT INF: CPT

## 2022-12-28 PROCEDURE — 99291 CRITICAL CARE FIRST HOUR: CPT | Performed by: INTERNAL MEDICINE

## 2022-12-28 PROCEDURE — C9803 HOPD COVID-19 SPEC COLLECT: HCPCS | Performed by: EMERGENCY MEDICINE

## 2022-12-28 PROCEDURE — 700105 HCHG RX REV CODE 258: Performed by: EMERGENCY MEDICINE

## 2022-12-28 PROCEDURE — 96365 THER/PROPH/DIAG IV INF INIT: CPT

## 2022-12-28 RX ORDER — AMOXICILLIN 250 MG
2 CAPSULE ORAL 2 TIMES DAILY
Status: DISCONTINUED | OUTPATIENT
Start: 2022-12-28 | End: 2022-12-30 | Stop reason: HOSPADM

## 2022-12-28 RX ORDER — ACETAMINOPHEN 325 MG/1
650 TABLET ORAL EVERY 6 HOURS PRN
Status: DISCONTINUED | OUTPATIENT
Start: 2022-12-28 | End: 2022-12-30 | Stop reason: HOSPADM

## 2022-12-28 RX ORDER — GUAIFENESIN 600 MG/1
1200 TABLET, EXTENDED RELEASE ORAL EVERY 12 HOURS
Status: DISCONTINUED | OUTPATIENT
Start: 2022-12-28 | End: 2022-12-30 | Stop reason: HOSPADM

## 2022-12-28 RX ORDER — ENOXAPARIN SODIUM 100 MG/ML
40 INJECTION SUBCUTANEOUS DAILY
Status: DISCONTINUED | OUTPATIENT
Start: 2022-12-28 | End: 2022-12-30 | Stop reason: HOSPADM

## 2022-12-28 RX ORDER — AZITHROMYCIN 500 MG/1
500 INJECTION, POWDER, LYOPHILIZED, FOR SOLUTION INTRAVENOUS ONCE
Status: COMPLETED | OUTPATIENT
Start: 2022-12-28 | End: 2022-12-28

## 2022-12-28 RX ORDER — AZITHROMYCIN 500 MG/5ML
500 INJECTION, POWDER, LYOPHILIZED, FOR SOLUTION INTRAVENOUS EVERY 24 HOURS
Status: DISCONTINUED | OUTPATIENT
Start: 2022-12-29 | End: 2022-12-29

## 2022-12-28 RX ORDER — POLYETHYLENE GLYCOL 3350 17 G/17G
1 POWDER, FOR SOLUTION ORAL
Status: DISCONTINUED | OUTPATIENT
Start: 2022-12-28 | End: 2022-12-30 | Stop reason: HOSPADM

## 2022-12-28 RX ORDER — ACETAMINOPHEN 500 MG
1000 TABLET ORAL ONCE
Status: COMPLETED | OUTPATIENT
Start: 2022-12-28 | End: 2022-12-28

## 2022-12-28 RX ORDER — ONDANSETRON 4 MG/1
4 TABLET, ORALLY DISINTEGRATING ORAL EVERY 4 HOURS PRN
Status: DISCONTINUED | OUTPATIENT
Start: 2022-12-28 | End: 2022-12-30 | Stop reason: HOSPADM

## 2022-12-28 RX ORDER — IPRATROPIUM BROMIDE AND ALBUTEROL SULFATE 2.5; .5 MG/3ML; MG/3ML
3 SOLUTION RESPIRATORY (INHALATION)
Status: DISCONTINUED | OUTPATIENT
Start: 2022-12-28 | End: 2022-12-30 | Stop reason: HOSPADM

## 2022-12-28 RX ORDER — IBUPROFEN 800 MG/1
800 TABLET ORAL ONCE
Status: COMPLETED | OUTPATIENT
Start: 2022-12-28 | End: 2022-12-28

## 2022-12-28 RX ORDER — ONDANSETRON 2 MG/ML
4 INJECTION INTRAMUSCULAR; INTRAVENOUS EVERY 4 HOURS PRN
Status: DISCONTINUED | OUTPATIENT
Start: 2022-12-28 | End: 2022-12-30 | Stop reason: HOSPADM

## 2022-12-28 RX ORDER — BISACODYL 10 MG
10 SUPPOSITORY, RECTAL RECTAL
Status: DISCONTINUED | OUTPATIENT
Start: 2022-12-28 | End: 2022-12-30 | Stop reason: HOSPADM

## 2022-12-28 RX ORDER — ASPIRIN 325 MG
325 TABLET ORAL EVERY 6 HOURS PRN
COMMUNITY
End: 2023-03-02

## 2022-12-28 RX ORDER — SODIUM CHLORIDE, SODIUM LACTATE, POTASSIUM CHLORIDE, AND CALCIUM CHLORIDE .6; .31; .03; .02 G/100ML; G/100ML; G/100ML; G/100ML
30 INJECTION, SOLUTION INTRAVENOUS ONCE
Status: COMPLETED | OUTPATIENT
Start: 2022-12-28 | End: 2022-12-28

## 2022-12-28 RX ORDER — AZITHROMYCIN 500 MG/1
500 INJECTION, POWDER, LYOPHILIZED, FOR SOLUTION INTRAVENOUS ONCE
Status: DISCONTINUED | OUTPATIENT
Start: 2022-12-29 | End: 2022-12-28

## 2022-12-28 RX ADMIN — AMPICILLIN AND SULBACTAM 3 G: 1; 2 INJECTION, POWDER, FOR SOLUTION INTRAMUSCULAR; INTRAVENOUS at 21:00

## 2022-12-28 RX ADMIN — ENOXAPARIN SODIUM 40 MG: 40 INJECTION SUBCUTANEOUS at 17:19

## 2022-12-28 RX ADMIN — IBUPROFEN 800 MG: 800 TABLET, FILM COATED ORAL at 02:34

## 2022-12-28 RX ADMIN — GUAIFENESIN 1200 MG: 600 TABLET, EXTENDED RELEASE ORAL at 08:15

## 2022-12-28 RX ADMIN — AMPICILLIN AND SULBACTAM: 1; 2 INJECTION, POWDER, FOR SOLUTION INTRAMUSCULAR; INTRAVENOUS at 10:07

## 2022-12-28 RX ADMIN — GUAIFENESIN 1200 MG: 600 TABLET, EXTENDED RELEASE ORAL at 17:19

## 2022-12-28 RX ADMIN — AMPICILLIN AND SULBACTAM 3 G: 1; 2 INJECTION, POWDER, FOR SOLUTION INTRAMUSCULAR; INTRAVENOUS at 15:00

## 2022-12-28 RX ADMIN — ACETAMINOPHEN 1000 MG: 500 TABLET ORAL at 02:34

## 2022-12-28 RX ADMIN — SENNOSIDES AND DOCUSATE SODIUM 2 TABLET: 50; 8.6 TABLET ORAL at 17:19

## 2022-12-28 RX ADMIN — AZITHROMYCIN MONOHYDRATE 500 MG: 500 INJECTION, POWDER, LYOPHILIZED, FOR SOLUTION INTRAVENOUS at 03:42

## 2022-12-28 RX ADMIN — SODIUM CHLORIDE, POTASSIUM CHLORIDE, SODIUM LACTATE AND CALCIUM CHLORIDE 1905 ML: 600; 310; 30; 20 INJECTION, SOLUTION INTRAVENOUS at 02:34

## 2022-12-28 RX ADMIN — ACETAMINOPHEN 650 MG: 325 TABLET, FILM COATED ORAL at 07:53

## 2022-12-28 RX ADMIN — IPRATROPIUM BROMIDE AND ALBUTEROL SULFATE 3 ML: 2.5; .5 SOLUTION RESPIRATORY (INHALATION) at 21:42

## 2022-12-28 RX ADMIN — SODIUM CHLORIDE 3 G: 900 INJECTION INTRAVENOUS at 03:39

## 2022-12-28 ASSESSMENT — COGNITIVE AND FUNCTIONAL STATUS - GENERAL
HELP NEEDED FOR BATHING: A LITTLE
DAILY ACTIVITIY SCORE: 18
SUGGESTED CMS G CODE MODIFIER MOBILITY: CK
CLIMB 3 TO 5 STEPS WITH RAILING: A LOT
WALKING IN HOSPITAL ROOM: A LITTLE
DRESSING REGULAR UPPER BODY CLOTHING: A LITTLE
SUGGESTED CMS G CODE MODIFIER DAILY ACTIVITY: CK
TOILETING: A LITTLE
SUGGESTED CMS G CODE MODIFIER MOBILITY: CJ
SUGGESTED CMS G CODE MODIFIER DAILY ACTIVITY: CK
MOBILITY SCORE: 19
EATING MEALS: A LITTLE
TOILETING: A LITTLE
WALKING IN HOSPITAL ROOM: A LITTLE
DAILY ACTIVITIY SCORE: 19
STANDING UP FROM CHAIR USING ARMS: A LITTLE
HELP NEEDED FOR BATHING: A LITTLE
MOBILITY SCORE: 20
CLIMB 3 TO 5 STEPS WITH RAILING: A LOT
DRESSING REGULAR UPPER BODY CLOTHING: A LITTLE
PERSONAL GROOMING: A LITTLE
DRESSING REGULAR LOWER BODY CLOTHING: A LITTLE
MOVING FROM LYING ON BACK TO SITTING ON SIDE OF FLAT BED: A LITTLE
EATING MEALS: A LITTLE
STANDING UP FROM CHAIR USING ARMS: A LITTLE
DRESSING REGULAR LOWER BODY CLOTHING: A LITTLE

## 2022-12-28 ASSESSMENT — LIFESTYLE VARIABLES
CONSUMPTION TOTAL: INCOMPLETE
HAVE PEOPLE ANNOYED YOU BY CRITICIZING YOUR DRINKING: NO
HAVE PEOPLE ANNOYED YOU BY CRITICIZING YOUR DRINKING: NO
HAVE YOU EVER FELT YOU SHOULD CUT DOWN ON YOUR DRINKING: NO
HAVE YOU EVER FELT YOU SHOULD CUT DOWN ON YOUR DRINKING: NO
CONSUMPTION TOTAL: NEGATIVE
AVERAGE NUMBER OF DAYS PER WEEK YOU HAVE A DRINK CONTAINING ALCOHOL: 0
EVER HAD A DRINK FIRST THING IN THE MORNING TO STEADY YOUR NERVES TO GET RID OF A HANGOVER: NO
TOTAL SCORE: 0
ON A TYPICAL DAY WHEN YOU DRINK ALCOHOL HOW MANY DRINKS DO YOU HAVE: 0
TOTAL SCORE: 0
EVER FELT BAD OR GUILTY ABOUT YOUR DRINKING: NO
TOTAL SCORE: 0
DOES PATIENT WANT TO STOP DRINKING: NO
EVER HAD A DRINK FIRST THING IN THE MORNING TO STEADY YOUR NERVES TO GET RID OF A HANGOVER: NO
TOTAL SCORE: 0
TOTAL SCORE: 0
ALCOHOL_USE: NO
ALCOHOL_USE: NO
TOTAL SCORE: 0
HOW MANY TIMES IN THE PAST YEAR HAVE YOU HAD 5 OR MORE DRINKS IN A DAY: 0
EVER FELT BAD OR GUILTY ABOUT YOUR DRINKING: NO

## 2022-12-28 ASSESSMENT — ENCOUNTER SYMPTOMS
DIZZINESS: 0
HEADACHES: 0
SHORTNESS OF BREATH: 1
NECK PAIN: 0
COUGH: 1
DIARRHEA: 0
LOSS OF CONSCIOUSNESS: 0
BACK PAIN: 0
CHILLS: 0
CARDIOVASCULAR NEGATIVE: 1
FLANK PAIN: 0
ABDOMINAL PAIN: 0
FEVER: 1
DOUBLE VISION: 0
SORE THROAT: 0
FEVER: 0
GASTROINTESTINAL NEGATIVE: 1
NERVOUS/ANXIOUS: 0
EYES NEGATIVE: 1
CHILLS: 1
MUSCULOSKELETAL NEGATIVE: 1
PALPITATIONS: 0
PSYCHIATRIC NEGATIVE: 1
BRUISES/BLEEDS EASILY: 0
BLURRED VISION: 0
NEUROLOGICAL NEGATIVE: 1
NAUSEA: 0
DEPRESSION: 0
SINUS PAIN: 0
SPUTUM PRODUCTION: 1
VOMITING: 0

## 2022-12-28 ASSESSMENT — COPD QUESTIONNAIRES
HAVE YOU SMOKED AT LEAST 100 CIGARETTES IN YOUR ENTIRE LIFE: NO/DON'T KNOW
COPD SCREENING SCORE: 4
DURING THE PAST 4 WEEKS HOW MUCH DID YOU FEEL SHORT OF BREATH: SOME OF THE TIME
DO YOU EVER COUGH UP ANY MUCUS OR PHLEGM?: YES, A FEW DAYS A WEEK OR MONTH

## 2022-12-28 NOTE — ASSESSMENT & PLAN NOTE
Due to suspected bacterial pneumonia  Pt is a confirmed DNR/DNI  Cont [facility] for O2 goals > 92%  RT/O2 protocols  Aim for euvolemia  May need ECHO

## 2022-12-28 NOTE — CONSULTS
Critical Care Consultation    Date of consult: 12/28/2022    Referring Physician  Katelin Liu M.D.    Reason for Consultation  Acute hypoxic respiratory failure due to suspected bacterial pneumonia.    History of Presenting Illness  Ms. Funez is a 89-year-old female with past medical history significant for asthma as well as a possible lobectomy according to old chart review who was found at the [facility] with worsening shortness of breath and a cough symptoms.  The patient reports that she started feeling ill about 1 week ago.  She notes that her cough is productive with gray sputum.  She denies any obvious sick contacts.  She denies any fevers or chills.  Due to the patient being on maximum high flow nasal cannula she will be admitted to the intermediate medical care unit.    Code Status  DNAR/DNI    Review of Systems  Review of Systems   Constitutional:  Positive for malaise/fatigue. Negative for chills and fever.   HENT:  Negative for sinus pain and sore throat.    Eyes:  Negative for blurred vision and double vision.   Respiratory:  Positive for cough, sputum production and shortness of breath.    Cardiovascular:  Negative for chest pain and leg swelling.   Gastrointestinal:  Negative for nausea and vomiting.   Genitourinary:  Negative for flank pain and hematuria.   Musculoskeletal:  Negative for back pain and neck pain.   Skin:  Negative for rash.   Neurological:  Negative for dizziness and headaches.   Endo/Heme/Allergies:  Does not bruise/bleed easily.   Psychiatric/Behavioral:  Negative for depression. The patient is not nervous/anxious.    **Very hard of hearing      Past Medical History  No medical history per patient, ?asthma    Surgical History  Hysterectomy and lobectomy    Family History  None that she can remember    Social History   No smoking, alcohol, or illegal drugs    Medications  Home Medications    **Home medications have not yet been reviewed for this encounter**       Current  Facility-Administered Medications   Medication Dose Route Frequency Provider Last Rate Last Admin    ampicillin/sulbactam (UNASYN) 3 g in  mL IVPB  3 g Intravenous Once Katelin Liu M.D. 200 mL/hr at 12/28/22 0339 3 g at 12/28/22 0339    azithromycin (ZITHROMAX) injection 500 mg  500 mg Intravenous Once Katelin Liu M.D.   500 mg at 12/28/22 0342     No current outpatient medications on file.       Allergies  No Known Allergies    Vital Signs last 24 hours  Temp:  [38.9 °C (102.1 °F)] 38.9 °C (102.1 °F)  Pulse:  [] 83  Resp:  [20-34] 33  BP: (98)/(55-59) 98/59  SpO2:  [84 %-96 %] 96 %    Physical Exam  Physical Exam  Vitals and nursing note reviewed.   Constitutional:       General: She is not in acute distress.     Appearance: She is ill-appearing. She is not toxic-appearing.      Comments: Very hard of hearing   HENT:      Head: Normocephalic and atraumatic.      Right Ear: External ear normal.      Left Ear: External ear normal.      Nose: Nose normal.      Comments: HFNC in place     Mouth/Throat:      Pharynx: Oropharynx is clear.   Eyes:      General: No scleral icterus.     Conjunctiva/sclera: Conjunctivae normal.      Pupils: Pupils are equal, round, and reactive to light.   Cardiovascular:      Rate and Rhythm: Regular rhythm. Tachycardia present.      Pulses:           Radial pulses are 2+ on the right side and 2+ on the left side.        Dorsalis pedis pulses are 2+ on the right side and 2+ on the left side.      Heart sounds: Heart sounds are distant. No murmur heard.  Pulmonary:      Breath sounds: Wheezing present.      Comments: Coarse wheezing heard  Chest:      Chest wall: No tenderness.   Abdominal:      General: There is no distension.      Palpations: Abdomen is soft.      Tenderness: There is no abdominal tenderness. There is no guarding.   Musculoskeletal:         General: Normal range of motion.      Cervical back: Normal range of motion and neck supple.      Right lower  leg: No edema.      Left lower leg: No edema.   Lymphadenopathy:      Cervical: No cervical adenopathy.   Skin:     General: Skin is warm and dry.      Capillary Refill: Capillary refill takes 2 to 3 seconds.      Findings: No rash.   Neurological:      Mental Status: She is alert and oriented to person, place, and time.      Cranial Nerves: No cranial nerve deficit.      Sensory: No sensory deficit.      Motor: No weakness.   Psychiatric:         Mood and Affect: Mood normal.         Behavior: Behavior normal.         Thought Content: Thought content normal.       Fluids  No intake or output data in the 24 hours ending 22 0357    Laboratory  Recent Results (from the past 48 hour(s))   EKG    Collection Time: 22  2:01 AM   Result Value Ref Range    Report       [facility]    Test Date:  2022  Pt Name:    BABS LAU             Department: ER  MRN:        [id number]                      Room:       [facility]  Gender:     Female                       Technician: [id number]  :        1933                   Requested By:CINDY FISCHER  Order #:    [id number]                    Reading MD:    Measurements  Intervals                                Axis  Rate:       94                           P:          62  UT:         168                          QRS:        -86  QRSD:       98                           T:          64  QT:         359  QTc:        449    Interpretive Statements  Sinus rhythm  Left anterior fascicular block  Abnormal R-wave progression, late transition  Baseline wander in lead(s) V6  No previous ECG available for comparison     CBC With Differential    Collection Time: 22  2:14 AM   Result Value Ref Range    WBC 13.8 (H) 4.8 - 10.8 K/uL    RBC 4.37 4.20 - 5.40 M/uL    Hemoglobin 13.5 12.0 - 16.0 g/dL    Hematocrit 39.7 37.0 - 47.0 %    MCV 90.8 81.4 - 97.8 fL    MCH 30.9 27.0 - 33.0 pg    MCHC 34.0 33.6 - 35.0 g/dL    RDW 43.8 35.9 - 50.0  fL    Platelet Count 342 164 - 446 K/uL    MPV 10.0 9.0 - 12.9 fL    Neutrophils-Polys 91.80 (H) 44.00 - 72.00 %    Lymphocytes 1.80 (L) 22.00 - 41.00 %    Monocytes 4.90 0.00 - 13.40 %    Eosinophils 0.00 0.00 - 6.90 %    Basophils 0.50 0.00 - 1.80 %    Immature Granulocytes 1.00 (H) 0.00 - 0.90 %    Nucleated RBC 0.00 /100 WBC    Neutrophils (Absolute) 12.68 (H) 2.00 - 7.15 K/uL    Lymphs (Absolute) 0.25 (L) 1.00 - 4.80 K/uL    Monos (Absolute) 0.68 0.00 - 0.85 K/uL    Eos (Absolute) 0.00 0.00 - 0.51 K/uL    Baso (Absolute) 0.07 0.00 - 0.12 K/uL    Immature Granulocytes (abs) 0.14 (H) 0.00 - 0.11 K/uL    NRBC (Absolute) 0.00 K/uL   CoV-2, FLU A/B, and RSV by PCR (2-4 Hours Gradeable) : Collect NP swab in VTM    Collection Time: 12/28/22  2:14 AM    Specimen: Respirate   Result Value Ref Range    Influenza virus A RNA Negative Negative    Influenza virus B, PCR Negative Negative    RSV, PCR Negative Negative    SARS-CoV-2 by PCR NotDetected     SARS-CoV-2 Source NP Swab    Lactic acid (lactate)    Collection Time: 12/28/22  3:01 AM   Result Value Ref Range    Lactic Acid 2.1 (H) 0.5 - 2.0 mmol/L   Comp Metabolic Panel    Collection Time: 12/28/22  3:01 AM   Result Value Ref Range    Sodium 130 (L) 135 - 145 mmol/L    Potassium 3.7 3.6 - 5.5 mmol/L    Chloride 95 (L) 96 - 112 mmol/L    Co2 22 20 - 33 mmol/L    Anion Gap 13.0 7.0 - 16.0    Glucose 106 (H) 65 - 99 mg/dL    Bun 36 (H) 8 - 22 mg/dL    Creatinine 0.89 0.50 - 1.40 mg/dL    Calcium 8.5 8.5 - 10.5 mg/dL    AST(SGOT) 37 12 - 45 U/L    ALT(SGPT) 17 2 - 50 U/L    Alkaline Phosphatase 91 30 - 99 U/L    Total Bilirubin 2.3 (H) 0.1 - 1.5 mg/dL    Albumin 3.2 3.2 - 4.9 g/dL    Total Protein 6.4 6.0 - 8.2 g/dL    Globulin 3.2 1.9 - 3.5 g/dL    A-G Ratio 1.0 g/dL   ESTIMATED GFR    Collection Time: 12/28/22  3:01 AM   Result Value Ref Range    GFR (CKD-EPI) 62 >60 mL/min/1.73 m 2   CORRECTED CALCIUM    Collection Time: 12/28/22  3:01 AM   Result Value Ref Range     Correct Calcium 9.1 8.5 - 10.5 mg/dL   Urinalysis    Collection Time: 12/28/22  3:17 AM    Specimen: Urine, Clean Catch   Result Value Ref Range    Micro Urine Req Microscopic        Imaging  CXR(reviewed): patchy infiltrates noted to the right mid lung and base    Assessment/Plan  * Sepsis (HCC)- (present on admission)  Assessment & Plan  This is Sepsis Present on admission  SIRS criteria identified on my evaluation include: Tachycardia, with heart rate greater than 90 BPM, Tachypnea, with respirations greater than 20 per minute and Leukocytosis, with WBC greater than 12,000  Source is urine vs pulmonary  Sepsis protocol initiated  Fluid resuscitation ordered per protocol  Crystalloid Fluid Administration: Fluid resuscitation ordered per standard protocol - 30 mL/kg per current or ideal body weight  IV antibiotics as appropriate for source of sepsis  Reassessment: I have reassessed the patient's hemodynamic status    Follow urine and blood cultures  Cont broad spectrum abx  MAP goals > 65  Monitor UOP/creat  Trend lactate          DNR (do not resuscitate)- (present on admission)  Assessment & Plan  Discussed code status and patient does not want resuscitation or mechanical ventilation  DNR/DNI    Pneumonia due to infectious organism- (present on admission)  Assessment & Plan  Check urine Strep and Legionella Ag  Cont broad spectrum abx:  Azithromycin and ceftriaxone  Rt/O2 protocols  RSV/COVID/Influenza negative    Acute respiratory failure with hypoxia (HCC)- (present on admission)  Assessment & Plan  Due to suspected bacterial pneumonia  Pt is a confirmed DNR/DNI  Cont HHFNC for O2 goals > 92%  RT/O2 protocols  Aim for euvolemia  May need ECHO        Discussed patient condition and risk of morbidity and/or mortality with Hospitalist, RN, RT, Therapies, Pharmacy, Code status disscussed, Charge nurse / hot rounds, and Patient.      The patient remains critically ill requiring active titration of high flow nasal  cannula for acute hypoxic respiratory failure due to what appears to be bacterial pneumonia.  The patient is at high risk of clinical deterioration, worsening vital organ dysfunction, and death without the above critical care interventions.    Critical care time = 65 minutes in directly providing and coordinating critical care and extensive data review.  No time overlap and excludes procedures.

## 2022-12-28 NOTE — HOSPITAL COURSE
This is an 89-year-old female who resides at the [facility] with past medical history of asthma who was admitted on 12/28/2022 with acute hypoxemic respiratory failure and sepsis likely secondary to bacterial pneumonia.    Patient on admission is requiring HHFNC, due to suspected bacterial pneumonia, will be monitored on the IMCU floor.  Influenza, RSV, COVID-19 negative. CXR noted probable right middle and lower lobe pneumonia.

## 2022-12-28 NOTE — ASSESSMENT & PLAN NOTE
This is Sepsis Present on admission  SIRS criteria identified on my evaluation include: Tachycardia, with heart rate greater than 90 BPM, Tachypnea, with respirations greater than 20 per minute and Leukocytosis, with WBC greater than 12,000  Source is urine vs pulmonary  Sepsis protocol initiated  Fluid resuscitation ordered per protocol  Crystalloid Fluid Administration: Fluid resuscitation ordered per standard protocol - 30 mL/kg per current or ideal body weight  IV antibiotics as appropriate for source of sepsis  Reassessment: I have reassessed the patient's hemodynamic status    Follow urine and blood cultures  Cont broad spectrum abx  MAP goals > 65  Monitor UOP/creat  Trend lactate

## 2022-12-28 NOTE — ED NOTES
Med Rec completed per patient at bedside  Allergies reviewed and updated   No ORAL antibiotics in last 30 days

## 2022-12-28 NOTE — CARE PLAN
The patient is Watcher - Medium risk of patient condition declining or worsening         Progress made toward(s) clinical / shift goals:    Problem: Knowledge Deficit - Standard  Goal: Patient and family/care givers will demonstrate understanding of plan of care, disease process/condition, diagnostic tests and medications  Outcome: Progressing     Problem: Hemodynamics  Goal: Patient's hemodynamics, fluid balance and neurologic status will be stable or improve  Outcome: Progressing     Problem: Respiratory  Goal: Patient will achieve/maintain optimum respiratory ventilation and gas exchange  Outcome: Progressing     Problem: Skin Integrity  Goal: Skin integrity is maintained or improved  Outcome: Progressing       Patient is not progressing towards the following goals:

## 2022-12-28 NOTE — ED PROVIDER NOTES
"ED Provider Note    CHIEF COMPLAINT  Chief Complaint   Patient presents with    Shortness of Breath     PT from [facility], reports SOB x 1 week. PT reports productive cough x 1 month, reports mucous is gray in color.        LIMITATION TO HISTORY   Select: Altered mental status / Confusion    HPI  Alyssa Funez is a 89 y.o. female who presents to the emerge department from [facility] for hypoxia and altered mental status.  Apparently she was reporting a week of shortness of breath and productive cough for maybe a month.  The patient for me is not a great historian she tells me that she hurts everywhere she has been coughing she does not know for how long she does not know or if she has been vomiting or having diarrhea.  She states she lives in a motel she is not on oxygen at home but otherwise her history is pretty limited    OUTSIDE HISTORIAN(S):  Select: EMS arrival vitals and interventions    EXTERNAL RECORDS REVIEWED  Select: Other patient has never been here before    REVIEW OF SYSTEMS  See HPI for further details. All other systems are negative.     PAST MEDICAL HISTORY       SURGICAL HISTORY  patient denies any surgical history    FAMILY HISTORY  No family history on file.    SOCIAL HISTORY  Social History     Tobacco Use    Smoking status: Not on file    Smokeless tobacco: Not on file   Substance and Sexual Activity    Alcohol use: Not on file    Drug use: Not on file    Sexual activity: Not on file       CURRENT MEDICATIONS  Home Medications    **Home medications have not yet been reviewed for this encounter**         ALLERGIES  No Known Allergies    PHYSICAL EXAM  VITAL SIGNS: BP 98/55   Pulse 100   Temp (!) 38.9 °C (102.1 °F) (Oral)   Resp (!) 34   Ht 1.575 m (5' 2\")   Wt 63.5 kg (140 lb)   SpO2 (!) 84%   BMI 25.61 kg/m²    Pulse Ox Interpretation:   Pulse Ox is low on room air  Constitutional: Alert in mild distress  HENT: Normocephalic atraumatic, MMM  Eyes: PER, Conjunctiva normal, " Non-icteric.   Neck: Normal range of motion, No tenderness, Supple, No stridor.   Cardiovascular: Regular rate and rhythm, no murmurs.   Thorax & Lungs: Rhonchi in the right lower lobe tachypnea and accessory muscle use no chest tenderness.   Abdomen: Bowel sounds normal, Soft, No tenderness, No pulsatile masses. No peritoneal signs.  Skin: W hot to touch, Dry, No erythema, No rash.   Back: No bony tenderness, No CVA tenderness.   Extremities/MSK: Intact equal distal pulses, No edema, No tenderness, No cyanosis, no major deformities noted  Neurologic: Alert and to person and city but not place or year no focal deficits noted and she is generally confused      DIAGNOSTIC STUDIES / PROCEDURES  EKG  Results for orders placed or performed during the hospital encounter of 22   EKG   Result Value Ref Range    Report       [facility] Emergency Dept.    Test Date:  2022  Pt Name:    BABS LAU             Department: ER  MRN:        [id number]                      Room:       [facility]  Gender:     Female                       Technician: [id number]  :        1933                   Requested By:CINDY FISCHER  Order #:    [id number]                    Reading MD:    Measurements  Intervals                                Axis  Rate:       94                           P:          62  GA:         168                          QRS:        -86  QRSD:       98                           T:          64  QT:         359  QTc:        449    Interpretive Statements  Sinus rhythm  Left anterior fascicular block  Abnormal R-wave progression, late transition  Baseline wander in lead(s) V6  No previous ECG available for comparison       Within normal limits no evidence of ST elevation ST depression    LABS  Labs Reviewed   LACTIC ACID - Abnormal; Notable for the following components:       Result Value    Lactic Acid 2.1 (*)     All other components within normal limits   CBC WITH DIFFERENTIAL -  "Abnormal; Notable for the following components:    WBC 13.8 (*)     Neutrophils-Polys 91.80 (*)     Lymphocytes 1.80 (*)     Immature Granulocytes 1.00 (*)     Neutrophils (Absolute) 12.68 (*)     Lymphs (Absolute) 0.25 (*)     Immature Granulocytes (abs) 0.14 (*)     All other components within normal limits   COMP METABOLIC PANEL - Abnormal; Notable for the following components:    Sodium 130 (*)     Chloride 95 (*)     Glucose 106 (*)     Bun 36 (*)     Total Bilirubin 2.3 (*)     All other components within normal limits   URINALYSIS - Abnormal; Notable for the following components:    Character Cloudy (*)     Ketones 15 (*)     Protein 30 (*)     Leukocyte Esterase Moderate (*)     All other components within normal limits    Narrative:     Indication for culture:->Evaluation for sepsis without a  clear source of infection   URINE MICROSCOPIC (W/UA) - Abnormal; Notable for the following components:    WBC  (*)     Bacteria Many (*)     All other components within normal limits    Narrative:     Indication for culture:->Evaluation for sepsis without a  clear source of infection   COV-2, FLU A/B, AND RSV BY PCR ([employer])   ESTIMATED GFR   CORRECTED CALCIUM   LACTIC ACID   LACTIC ACID   URINE CULTURE(NEW)    Narrative:     Indication for culture:->Evaluation for sepsis without a  clear source of infection   BLOOD CULTURE    Narrative:     Per Hospital Policy: Only change Specimen Src: to \"Line\" if  specified by physician order.   BLOOD CULTURE    Narrative:     Per Hospital Policy: Only change Specimen Src: to \"Line\" if  specified by physician order.   STREP PNEUMO AG URINE   LEGIONELLA PNEUMOPHILA UR AG         RADIOLOGY  I have independently interpreted the diagnostic imaging associated with this visit and am waiting the final reading from the radiologist. Select: X-ray(s) chest x-ray showing signs of right lower lobe pneumonia with effusion      COURSE & MEDICAL DECISION MAKING  Pertinent Labs & Imaging " studies reviewed. (See chart for details)    Differential Diagnosis Considered  Patient presented with signs symptoms likely consistent with either viral or bacterial pneumonia.  She was hypoxic tachypneic with abnormal breath sounds mostly in the right lower lobe.  She was treated with antipyretics as well as a sepsis protocol and IV fluids.  She was treated with antibiotics as well and pending her COVID analysis.  Patient is a little confused cannot tell me how long she has been sick for we do not have any family at the bedside      Diagnostic tests and prescription drugs considered including, but not limited to: Select: D-dimer patient not complaining of any chest pain she is short of breath and hypoxic by most likely secondary to infectious process especially if she is got elevated white blood cell count and a fever.  D-dimer was not selected this time as I doubt pulmonary embolism .    In addition to the chief complaint, the following problems were addressed: Body aches she was treated with pain management as well as antipyretics    I have discussed management of the patient with the following physicians and LILLIE's:      Spoke with Dr. Hu with the ICU the patient was started on high flow nasal cannula secondary to her respiratory distress and she is tolerating it much better and doing much improved and appears more comfortable.  She agrees patient is a good candidate for stepdown      Spoke Dr. Schmidt with the hospitalist service and he has accepted the patient      Patient given IV fluids in the setting of sepsis    CRITICAL CARE  The very real possibilty of a deterioration of this patient's condition required the highest level of my preparedness for sudden, emergent intervention.  I provided critical care services, which included medication orders, frequent reevaluations of the patient's condition and response to treatment, ordering and reviewing test results, and discussing the case with various  consultants.  The critical care time associated with the care of the patient was 35 minutes. Review chart for interventions. This time is exclusive of any other billable procedures.       FINAL IMPRESSION  1.  Right lower lobe bacterial pneumonia  .  Confusion  3.  UTI  4.  Sepsis    Record care time 35 minutes       Electronically signed by: Katelin Liu M.D., 12/28/2022 2:03 AM

## 2022-12-28 NOTE — PROGRESS NOTES
4 Eyes Skin Assessment Completed by JUANA Estrella RN and KELVIN Medina.    Head WDL  Ears WDL  Nose WDL  Mouth WDL  Neck WDL  Breast/Chest WDL  Shoulder Blades WDL  Spine WDL  (R) Arm/Elbow/Hand WDL  (L) Arm/Elbow/Hand WDL  Abdomen WDL  Groin WDL  Scrotum/Coccyx/Buttocks Redness, Blanching, and Excoriation  (R) Leg WDL  (L) Leg WDL  (R) Heel/Foot/Toe WDL  (L) Heel/Foot/Toe WDL          Devices In Places ECG, Blood Pressure Cuff, Pulse Ox, SCD's, and Oxy Mask      Interventions In Place Pillows, Q2 Turns, Low Air Loss Mattress, and Heels Loaded W/Pillows    Possible Skin Injury No    Pictures Uploaded Into Epic Yes  Wound Consult Placed N/A  RN Wound Prevention Protocol Ordered Yes

## 2022-12-28 NOTE — ASSESSMENT & PLAN NOTE
This is Sepsis Present on admission  Source is PNA  Cultures neg  S/p IVF sepsis bolus  Pressures borderline but pt is now ASxympt, extremities warm and perfused, no evidence of end organ failure and LA has normalized

## 2022-12-28 NOTE — H&P
Hospital Medicine History & Physical Note    Date of Service  12/28/2022    Primary Care Physician  No primary care provider on file.    Consultants  None    Code Status  DNAR/DNI    Chief Complaint  Chief Complaint   Patient presents with    Shortness of Breath     PT from [facility], reports SOB x 1 week. PT reports productive cough x 1 month, reports mucous is gray in color.        History of Presenting Illness  Alyssa Funez is a 89 y.o. female who presented 12/28/2022 with worsening shortness of breath for a week.  Complains of a productive cough with grayish phlegm. She denies drinking smoke illicit drug use..    In ED, patient febrile and tachypneic and tachycardic. WBC 13.8.  UA positive for UTI.  Chest x-ray showing probable right mid and lower lung pneumonia with small right pleural effusion.    I discussed the plan of care with patient.    Review of Systems  Review of Systems   Constitutional:  Positive for chills, fever and malaise/fatigue.   HENT: Negative.     Eyes: Negative.    Respiratory:  Positive for shortness of breath.    Cardiovascular: Negative.    Gastrointestinal: Negative.    Genitourinary: Negative.    Musculoskeletal: Negative.    Skin: Negative.    Neurological: Negative.    Endo/Heme/Allergies: Negative.    Psychiatric/Behavioral: Negative.       Past Medical History  No pertinent medical history    Surgical History  No pertinent surgical history    Family History   Family history reviewed with patient. There is no family history that is pertinent to the chief complaint.     Social History       Allergies  No Known Allergies    Medications  None       Physical Exam  Temp:  [38.9 °C (102.1 °F)] 38.9 °C (102.1 °F)  Pulse:  [] 76  Resp:  [20-34] 25  BP: ()/(55-83) 133/83  SpO2:  [84 %-96 %] 96 %  Blood Pressure : 133/83   Temperature: (!) 38.9 °C (102.1 °F)   Pulse: 76   Respiration: (!) 25   Pulse Oximetry: 96 %       Physical Exam  Constitutional:       Appearance:  Normal appearance. She is normal weight.   HENT:      Head: Normocephalic.      Nose: Nose normal.      Mouth/Throat:      Mouth: Mucous membranes are moist.   Eyes:      Pupils: Pupils are equal, round, and reactive to light.   Cardiovascular:      Rate and Rhythm: Regular rhythm. Tachycardia present.      Pulses: Normal pulses.   Pulmonary:      Effort: Pulmonary effort is normal.      Comments: On high flow nasal cannula  Rhonchi heard on right lung base    Abdominal:      General: Abdomen is flat. Bowel sounds are normal.      Palpations: Abdomen is soft.   Musculoskeletal:         General: Normal range of motion.      Cervical back: Neck supple.   Skin:     General: Skin is warm.   Neurological:      General: No focal deficit present.      Mental Status: She is alert and oriented to person, place, and time. Mental status is at baseline.   Psychiatric:         Mood and Affect: Mood normal.         Behavior: Behavior normal.         Thought Content: Thought content normal.       Laboratory:  Recent Labs     12/28/22  0214   WBC 13.8*   RBC 4.37   HEMOGLOBIN 13.5   HEMATOCRIT 39.7   MCV 90.8   MCH 30.9   MCHC 34.0   RDW 43.8   PLATELETCT 342   MPV 10.0     Recent Labs     12/28/22  0301   SODIUM 130*   POTASSIUM 3.7   CHLORIDE 95*   CO2 22   GLUCOSE 106*   BUN 36*   CREATININE 0.89   CALCIUM 8.5     Recent Labs     12/28/22  0301   ALTSGPT 17   ASTSGOT 37   ALKPHOSPHAT 91   TBILIRUBIN 2.3*   GLUCOSE 106*         No results for input(s): NTPROBNP in the last 72 hours.      No results for input(s): TROPONINT in the last 72 hours.    Imaging:  DX-CHEST-PORTABLE (1 VIEW)   Final Result      1.  Probable RIGHT mid and lower lung pneumonia with small RIGHT pleural effusion.   2.  Mildly prominent interstitium, nonspecific.   3.  Hyperinflation suggests COPD.          X-Ray:  I have personally reviewed the images and compared with prior images.    Assessment/Plan:  Justification for Admission Status  I anticipate this  patient will require at least two midnights for appropriate medical management, necessitating inpatient admission because patient has sepsis secondary to community-acquired pneumonia      * Sepsis (HCC)- (present on admission)  Assessment & Plan  This is Sepsis Present on admission  SIRS criteria identified on my evaluation include: Fever, with temperature greater than 101 deg F, Tachycardia, with heart rate greater than 90 BPM, Tachypnea, with respirations greater than 20 per minute and Leukocytosis, with WBC greater than 12,000  Source is PNA  Sepsis protocol initiated  Fluid resuscitation ordered per protocol  Crystalloid Fluid Administration: Fluid resuscitation ordered per standard protocol - 30 mL/kg per current or ideal body weight  IV antibiotics as appropriate for source of sepsis  Reassessment: I have reassessed the patient's hemodynamic status          Lactic acid acidosis  Assessment & Plan  From sepsis. Fluids. Trend    UTI (urinary tract infection)  Assessment & Plan  abx    DNR (do not resuscitate)- (present on admission)  Assessment & Plan  Discussed with patient.  DNR/DNI    Pneumonia due to infectious organism- (present on admission)  Assessment & Plan  Zithromax and Unasyn  Cultures        VTE prophylaxis: enoxaparin ppx

## 2022-12-28 NOTE — ED TRIAGE NOTES
"Chief Complaint   Patient presents with    Shortness of Breath     PT from [facility], reports SOB x 1 week. PT reports productive cough x 1 month, reports mucous is gray in color.        BIB EMS to green 23, pt on monitor, provided call bell and in gown, labs drawn and sent.      BP 98/55   Pulse 100   Temp (!) 38.9 °C (102.1 °F) (Oral)   Ht 1.575 m (5' 2\")   Wt 63.5 kg (140 lb)   SpO2 (!) 84%   BMI 25.61 kg/m²     "

## 2022-12-28 NOTE — ASSESSMENT & PLAN NOTE
Check urine Strep and Legionella Ag  Cont broad spectrum abx:  Azithromycin and ceftriaxone  Rt/O2 protocols  RSV/COVID/Influenza negative

## 2022-12-28 NOTE — PROGRESS NOTES
Patient had $16,665 in her johnathan pack patient access came counted money with myself and patient put in locked bag and taken to lock up.

## 2022-12-28 NOTE — ASSESSMENT & PLAN NOTE
Per PHILIP my partner on admission pt is DNR  PHILIP her son by phone: concerned pt does not want any care and unsure if she may not prefer [facility].  He was unsure of what she would want and asked that we call him again tomorrow

## 2022-12-28 NOTE — PROGRESS NOTES
Hospital Medicine Daily Progress Note    Date of Service  12/28/2022    Chief Complaint  Alyssa Funez is a 89 y.o. female admitted 12/28/2022 with SOB    Hospital Course  88yo PMHx asthma.  [facility] in from homeless [facility] with SOB.  Found to have pneumonia.  Admitted on HFNC.  COVID/influenza/RSV neg    Interval Problem Update  ROS limited as pt is quite hard of hearing  C/o productive cough and some pain when she coughs.    Tmax 102.1  Sinus 50-70s  SBP 80-90s  10 litres mask    I have discussed this patient's plan of care and discharge plan at IDT rounds today with Case Management, Nursing, Nursing leadership, and other members of the IDT team.    Consultants/Specialty      Code Status  DNAR/DNI    Disposition  Patient is not medically cleared for discharge.   Anticipate discharge to to home with close outpatient follow-up.  I have placed the appropriate orders for post-discharge needs.    Review of Systems  Review of Systems   Constitutional:  Negative for chills and fever.   HENT:  Negative for nosebleeds and sore throat.    Eyes:  Negative for blurred vision and double vision.   Respiratory:  Positive for cough, sputum production and shortness of breath.    Cardiovascular:  Positive for chest pain. Negative for palpitations and leg swelling.   Gastrointestinal:  Negative for abdominal pain, diarrhea, nausea and vomiting.   Genitourinary:  Negative for dysuria and urgency.   Musculoskeletal:  Negative for back pain.   Skin:  Negative for rash.   Neurological:  Negative for dizziness, loss of consciousness and headaches.      Physical Exam  Temp:  [35.7 °C (96.2 °F)-38.9 °C (102.1 °F)] 36.3 °C (97.3 °F)  Pulse:  [] 55  Resp:  [20-34] 21  BP: ()/(45-83) 83/48  SpO2:  [84 %-99 %] 97 %    Physical Exam  Constitutional:       General: She is not in acute distress.     Appearance: She is well-developed. She is not diaphoretic.   HENT:      Head: Normocephalic and atraumatic.   Neck:      Vascular: No  JVD.   Cardiovascular:      Rate and Rhythm: Normal rate and regular rhythm.   Pulmonary:      Effort: Pulmonary effort is normal. No respiratory distress.      Breath sounds: No stridor. Rhonchi present. No wheezing or rales.   Abdominal:      Palpations: Abdomen is soft.      Tenderness: There is no abdominal tenderness. There is no guarding or rebound.   Musculoskeletal:         General: No tenderness.      Right lower leg: No edema.      Left lower leg: No edema.   Skin:     General: Skin is warm and dry.      Findings: No rash.   Neurological:      Mental Status: She is alert and oriented to person, place, and time.   Psychiatric:         Mood and Affect: Mood normal.         Behavior: Behavior normal.         Thought Content: Thought content normal.       Fluids    Intake/Output Summary (Last 24 hours) at 12/28/2022 1244  Last data filed at 12/28/2022 0508  Gross per 24 hour   Intake 2100 ml   Output --   Net 2100 ml       Laboratory  Recent Labs     12/28/22  0214   WBC 13.8*   RBC 4.37   HEMOGLOBIN 13.5   HEMATOCRIT 39.7   MCV 90.8   MCH 30.9   MCHC 34.0   RDW 43.8   PLATELETCT 342   MPV 10.0     Recent Labs     12/28/22  0301   SODIUM 130*   POTASSIUM 3.7   CHLORIDE 95*   CO2 22   GLUCOSE 106*   BUN 36*   CREATININE 0.89   CALCIUM 8.5                   Imaging  DX-CHEST-PORTABLE (1 VIEW)   Final Result      1.  Probable RIGHT mid and lower lung pneumonia with small RIGHT pleural effusion.   2.  Mildly prominent interstitium, nonspecific.   3.  Hyperinflation suggests COPD.           Assessment/Plan  * Sepsis (HCC)- (present on admission)  Assessment & Plan  This is Sepsis Present on admission  Source is PNA  Follow cultures  S/p IVF sepsis bolus  Pressures borderline but pt is now ASxympt, extremities warm and perfused, no evidence of end organ failure and LA has normalized            Lactic acid acidosis  Assessment & Plan  From sepsis. Fluids. Trend    UTI (urinary tract infection)  Assessment &  Plan  Rocephin  Follow cultures    DNR (do not resuscitate)- (present on admission)  Assessment & Plan  Discussed with patient.  DNR/DNI    Pneumonia due to infectious organism- (present on admission)  Assessment & Plan  Zithromax   Amp/sulbactam  Follow cultures   COVID/Influenza/RSV neg    Acute respiratory failure with hypoxia (HCC)- (present on admission)  Assessment & Plan  Secondary to pneumonia  O2/RT protocols         VTE prophylaxis: enoxaparin ppx    I have performed a physical exam and reviewed and updated ROS and Plan today (12/28/2022). In review of yesterday's note (12/27/2022), there are no changes except as documented above.

## 2022-12-29 ENCOUNTER — APPOINTMENT (OUTPATIENT)
Dept: RADIOLOGY | Facility: MEDICAL CENTER | Age: 87
DRG: 871 | End: 2022-12-29
Attending: HOSPITALIST
Payer: MEDICARE

## 2022-12-29 VITALS
DIASTOLIC BLOOD PRESSURE: 56 MMHG | BODY MASS INDEX: 25.76 KG/M2 | HEART RATE: 83 BPM | TEMPERATURE: 97.6 F | WEIGHT: 140 LBS | SYSTOLIC BLOOD PRESSURE: 105 MMHG | HEIGHT: 62 IN | RESPIRATION RATE: 16 BRPM | OXYGEN SATURATION: 92 %

## 2022-12-29 LAB
ANION GAP SERPL CALC-SCNC: 13 MMOL/L (ref 7–16)
BUN SERPL-MCNC: 32 MG/DL (ref 8–22)
CALCIUM SERPL-MCNC: 8.5 MG/DL (ref 8.5–10.5)
CHLORIDE SERPL-SCNC: 94 MMOL/L (ref 96–112)
CO2 SERPL-SCNC: 24 MMOL/L (ref 20–33)
CREAT SERPL-MCNC: 0.72 MG/DL (ref 0.5–1.4)
ERYTHROCYTE [DISTWIDTH] IN BLOOD BY AUTOMATED COUNT: 45.4 FL (ref 35.9–50)
GFR SERPLBLD CREATININE-BSD FMLA CKD-EPI: 80 ML/MIN/1.73 M 2
GLUCOSE SERPL-MCNC: 78 MG/DL (ref 65–99)
HCT VFR BLD AUTO: 39.5 % (ref 37–47)
HGB BLD-MCNC: 13.4 G/DL (ref 12–16)
MCH RBC QN AUTO: 31.2 PG (ref 27–33)
MCHC RBC AUTO-ENTMCNC: 33.9 G/DL (ref 33.6–35)
MCV RBC AUTO: 91.9 FL (ref 81.4–97.8)
PLATELET # BLD AUTO: 322 K/UL (ref 164–446)
PMV BLD AUTO: 10.2 FL (ref 9–12.9)
POTASSIUM SERPL-SCNC: 3.3 MMOL/L (ref 3.6–5.5)
RBC # BLD AUTO: 4.3 M/UL (ref 4.2–5.4)
SODIUM SERPL-SCNC: 131 MMOL/L (ref 135–145)
WBC # BLD AUTO: 14.2 K/UL (ref 4.8–10.8)

## 2022-12-29 PROCEDURE — A9270 NON-COVERED ITEM OR SERVICE: HCPCS | Performed by: GENERAL PRACTICE

## 2022-12-29 PROCEDURE — 770001 HCHG ROOM/CARE - MED/SURG/GYN PRIV*

## 2022-12-29 PROCEDURE — A9270 NON-COVERED ITEM OR SERVICE: HCPCS | Performed by: HOSPITALIST

## 2022-12-29 PROCEDURE — 700102 HCHG RX REV CODE 250 W/ 637 OVERRIDE(OP): Performed by: GENERAL PRACTICE

## 2022-12-29 PROCEDURE — 700111 HCHG RX REV CODE 636 W/ 250 OVERRIDE (IP): Performed by: STUDENT IN AN ORGANIZED HEALTH CARE EDUCATION/TRAINING PROGRAM

## 2022-12-29 PROCEDURE — 99232 SBSQ HOSP IP/OBS MODERATE 35: CPT | Performed by: HOSPITALIST

## 2022-12-29 PROCEDURE — 700102 HCHG RX REV CODE 250 W/ 637 OVERRIDE(OP): Performed by: HOSPITALIST

## 2022-12-29 PROCEDURE — 85027 COMPLETE CBC AUTOMATED: CPT

## 2022-12-29 PROCEDURE — 700101 HCHG RX REV CODE 250: Performed by: STUDENT IN AN ORGANIZED HEALTH CARE EDUCATION/TRAINING PROGRAM

## 2022-12-29 PROCEDURE — 700105 HCHG RX REV CODE 258: Performed by: STUDENT IN AN ORGANIZED HEALTH CARE EDUCATION/TRAINING PROGRAM

## 2022-12-29 PROCEDURE — 80048 BASIC METABOLIC PNL TOTAL CA: CPT

## 2022-12-29 RX ORDER — AMOXICILLIN AND CLAVULANATE POTASSIUM 875; 125 MG/1; MG/1
1 TABLET, FILM COATED ORAL EVERY 12 HOURS
Status: DISCONTINUED | OUTPATIENT
Start: 2022-12-29 | End: 2022-12-30 | Stop reason: HOSPADM

## 2022-12-29 RX ADMIN — GUAIFENESIN 1200 MG: 600 TABLET, EXTENDED RELEASE ORAL at 04:36

## 2022-12-29 RX ADMIN — SENNOSIDES AND DOCUSATE SODIUM 2 TABLET: 50; 8.6 TABLET ORAL at 04:36

## 2022-12-29 RX ADMIN — AMPICILLIN AND SULBACTAM 3 G: 1; 2 INJECTION, POWDER, FOR SOLUTION INTRAMUSCULAR; INTRAVENOUS at 03:58

## 2022-12-29 RX ADMIN — AMPICILLIN AND SULBACTAM 3 G: 1; 2 INJECTION, POWDER, FOR SOLUTION INTRAMUSCULAR; INTRAVENOUS at 14:13

## 2022-12-29 RX ADMIN — GUAIFENESIN 1200 MG: 600 TABLET, EXTENDED RELEASE ORAL at 17:29

## 2022-12-29 RX ADMIN — AZITHROMYCIN MONOHYDRATE 500 MG: 500 INJECTION, POWDER, LYOPHILIZED, FOR SOLUTION INTRAVENOUS at 04:40

## 2022-12-29 RX ADMIN — AMPICILLIN AND SULBACTAM 3 G: 1; 2 INJECTION, POWDER, FOR SOLUTION INTRAMUSCULAR; INTRAVENOUS at 10:48

## 2022-12-29 RX ADMIN — AMOXICILLIN AND CLAVULANATE POTASSIUM 1 TABLET: 875; 125 TABLET, FILM COATED ORAL at 17:30

## 2022-12-29 RX ADMIN — ENOXAPARIN SODIUM 40 MG: 40 INJECTION SUBCUTANEOUS at 17:31

## 2022-12-29 ASSESSMENT — ENCOUNTER SYMPTOMS: COUGH: 1

## 2022-12-29 NOTE — CARE PLAN
The patient is Stable - Low risk of patient condition declining or worsening    Shift Goals  Clinical Goals: Neuro status, stable vitals  Patient Goals: Get home    Progress made toward(s) clinical / shift goals:    Problem: Knowledge Deficit - Standard  Goal: Patient and family/care givers will demonstrate understanding of plan of care, disease process/condition, diagnostic tests and medications  Outcome: Progressing     Problem: Fluid Volume  Goal: Fluid volume balance will be maintained  Outcome: Progressing     Problem: Skin Integrity  Goal: Skin integrity is maintained or improved  Outcome: Progressing     Problem: Fall Risk  Goal: Patient will remain free from falls  Outcome: Progressing     Problem: Pain - Standard  Goal: Alleviation of pain or a reduction in pain to the patient’s comfort goal  Outcome: Progressing      goals:

## 2022-12-29 NOTE — PROGRESS NOTES
At 0615 patient pulled out iv and tele monitor, b/p cuff got out of bed and walked to bathroom. Tele monitor called to say patient off monitor. Staff had been in room frequently during shift. Bed alarm had been on last check. Patient with shuffling gate and unsteady back to bed.

## 2022-12-29 NOTE — PROGRESS NOTES
"Hospital Medicine Daily Progress Note    Date of Service  12/29/2022    Chief Complaint  Alyssa Funez is a 89 y.o. female admitted 12/28/2022 with SOB    Hospital Course  88yo PMHx asthma.  Brought in from homeless shelter with SOB.  Found to have pneumonia.  Admitted on HFNC.  COVID/influenza/RSV neg    Interval Problem Update  ROS limited as pt is upset and will not answer my questions,  Kicks me when I try to auscultate her lungs \"dont' touch me\"  Does say she feels \"fine\"    Called pt's son and spoke with him RE her care.  When I asked him about GOC as it appears his Mom does not wish our care, he stated he didn't know and needed to think about it.    AFebrile  Sinus 60s  SBP   3 litres mask    I have discussed this patient's plan of care and discharge plan at IDT rounds today with Case Management, Nursing, Nursing leadership, and other members of the IDT team.    Consultants/Specialty      Code Status  DNAR/DNI    Disposition  Patient is not medically cleared for discharge.   Anticipate discharge to to home with close outpatient follow-up.  I have placed the appropriate orders for post-discharge needs.    Review of Systems  Review of Systems   Respiratory:  Positive for cough.       Physical Exam  Temp:  [35.7 °C (96.2 °F)-36.4 °C (97.5 °F)] 36.3 °C (97.3 °F)  Pulse:  [52-95] 66  Resp:  [18-67] 33  BP: ()/(45-83) 104/78  SpO2:  [92 %-99 %] 97 %    Physical Exam  Constitutional:       General: She is not in acute distress.     Appearance: She is well-developed. She is not diaphoretic.   HENT:      Head: Normocephalic and atraumatic.   Neck:      Vascular: No JVD.   Cardiovascular:      Rate and Rhythm: Normal rate and regular rhythm.   Pulmonary:      Effort: Pulmonary effort is normal. No respiratory distress.      Breath sounds: No stridor. Rhonchi present. No wheezing or rales.   Abdominal:      Palpations: Abdomen is soft.   Musculoskeletal:      Right lower leg: No edema.      Left lower " leg: No edema.   Skin:     General: Skin is warm and dry.      Findings: No rash.   Neurological:      General: No focal deficit present.      Mental Status: She is alert and oriented to person, place, and time.      Comments: O to person and place but does not know why she is in the hospital   Psychiatric:         Mood and Affect: Mood normal.         Behavior: Behavior is uncooperative, aggressive and combative.         Thought Content: Thought content normal.       Fluids    Intake/Output Summary (Last 24 hours) at 12/29/2022 0625  Last data filed at 12/28/2022 1700  Gross per 24 hour   Intake 400 ml   Output --   Net 400 ml         Laboratory  Recent Labs     12/28/22  0214 12/29/22  0331   WBC 13.8* 14.2*   RBC 4.37 4.30   HEMOGLOBIN 13.5 13.4   HEMATOCRIT 39.7 39.5   MCV 90.8 91.9   MCH 30.9 31.2   MCHC 34.0 33.9   RDW 43.8 45.4   PLATELETCT 342 322   MPV 10.0 10.2       Recent Labs     12/28/22  0301 12/29/22  0331   SODIUM 130* 131*   POTASSIUM 3.7 3.3*   CHLORIDE 95* 94*   CO2 22 24   GLUCOSE 106* 78   BUN 36* 32*   CREATININE 0.89 0.72   CALCIUM 8.5 8.5                     Imaging  DX-CHEST-PORTABLE (1 VIEW)   Final Result      1.  Probable RIGHT mid and lower lung pneumonia with small RIGHT pleural effusion.   2.  Mildly prominent interstitium, nonspecific.   3.  Hyperinflation suggests COPD.             Assessment/Plan  * Sepsis (HCC)- (present on admission)  Assessment & Plan  This is Sepsis Present on admission  Source is PNA  Cultures neg  S/p IVF sepsis bolus  Pressures borderline but pt is now ASxympt, extremities warm and perfused, no evidence of end organ failure and LA has normalized            Lactic acid acidosis  Assessment & Plan  From sepsis. Fluids. Trend    UTI (urinary tract infection)  Assessment & Plan  Rocephin  Follow cultures    DNR (do not resuscitate)- (present on admission)  Assessment & Plan  Per DW my partner on admission pt is DNR  DW her son by phone: concerned pt does not  want any care and unsure if she may not prefer [facility].  He was unsure of what she would want and asked that we call him again tomorrow    Pneumonia due to infectious organism- (present on admission)  Assessment & Plan  Zithromax   Amp/sulbactam  Cultures neg thus far  COVID/Influenza/RSV neg  clnically improved    Acute respiratory failure with hypoxia (HCC)- (present on admission)  Assessment & Plan  Secondary to pneumonia  O2/RT protocols  O2 demand has decreased significantly and now off HFNC         VTE prophylaxis: enoxaparin ppx    I have performed a physical exam and reviewed and updated ROS and Plan today (12/29/2022). In review of yesterday's note (12/28/2022), there are no changes except as documented above.

## 2022-12-29 NOTE — CARE PLAN
The patient is Stable - Low risk of patient condition declining or worsening    Shift Goals  Clinical Goals: stable vitals, saftey  Patient Goals: wants her shoes    Progress made toward(s) clinical / shift goals:    Patient vitals remain stable  Problem: Knowledge Deficit - Standard  Goal: Patient and family/care givers will demonstrate understanding of plan of care, disease process/condition, diagnostic tests and medications  Description: Target End Date:  1-3 days or as soon as patient condition allows    Document in Patient Education    1.  Patient and family/caregiver oriented to unit, equipment, visitation policy and means for communicating concern  2.  Complete/review Learning Assessment  3.  Assess knowledge level of disease process/condition, treatment plan, diagnostic tests and medications  4.  Explain disease process/condition, treatment plan, diagnostic tests and medications  Outcome: Progressing     Problem: Hemodynamics  Goal: Patient's hemodynamics, fluid balance and neurologic status will be stable or improve  Description: Target End Date:  Prior to discharge or change in level of care    Document on Assessment and I/O flowsheet templates    1.  Monitor vital signs, pulse oximetry and cardiac monitor per provider order and/or policy  2.  Maintain blood pressure per provider order  3.  Hemodynamic monitoring per provider order  4.  Manage IV fluids and IV infusions  5.  Monitor intake and output  6.  Daily weights per unit policy or provider order  7.  Assess peripheral pulses and capillary refill  8.  Assess color and body temperature  9.  Position patient for maximum circulation/cardiac output  10. Monitor for signs/symptoms of excessive bleeding  11. Assess mental status, restlessness and changes in level of consciousness  12. Monitor temperature and report fever or hypothermia to provider immediately. Consideration of targeted temperature management.  Outcome: Progressing     Problem: Fluid  Volume  Goal: Fluid volume balance will be maintained  Description: Target End Date:  Prior to discharge or change in level of care    Document on I/O flowsheet    1.  Monitor intake and output as ordered  2.  Promote oral intake as appropriate  3.  Report inadequate intake or output to physician  4.  Administer IV therapy as ordered  5.  Weights per provider order  6.  Assess for signs and symptoms of bleeding  7.  Monitor for signs of fluid overload (respiratory changes, edema, weight gain, increased abdominal girth)  8.  Monitor of signs for inadequate fluid volume (poor skin turgor, dry mucous membranes)  9.  Instruct patient on adherence to fluid restrictions  Outcome: Progressing     Problem: Urinary - Renal Perfusion  Goal: Ability to achieve and maintain adequate renal perfusion and functioning will improve  Description: Target End Date:  Prior to discharge or change in level of care    Document on I/O and Assessment flowsheet    1.  Urine output will remain greater than 0.5ml/Kg/HR  2.  Monitor amount and/or characteristics of urine per order/policy. Specific gravity per order/policy  3.  Assess signs and symptoms of renal dysfunction  Outcome: Progressing     Problem: Respiratory  Goal: Patient will achieve/maintain optimum respiratory ventilation and gas exchange  Description: Target End Date:  Prior to discharge or change in level of care    Document on Assessment flowsheet    1.  Assess and monitor rate, rhythm, depth and effort of respiration  2.  Breath sounds assessed qshift and/or as needed  3.  Assess O2 saturation, administer/titrate oxygen as ordered  4.  Position patient for maximum ventilatory efficiency  5.  Turn, cough, and deep breath with splinting to improve effectiveness  6.  Collaborate with RT to administer medication/treatments per order  7.  Encourage use of incentive spirometer and encourage patient to cough after use and utilize splinting techniques if applicable  8.  Airway  suctioning  9.  Monitor sputum production for changes in color, consistency and frequency  10. Perform frequent oral hygiene  11. Alternate physical activity with rest periods  Outcome: Progressing     Problem: Physical Regulation  Goal: Diagnostic test results will improve  Description: Target End Date:  Prior to discharge or change in level of care    1.  Monitor lactic acid levels  2.  Monitor ABG's  3.  Monitor diagnostic test results  Outcome: Progressing  Goal: Signs and symptoms of infection will decrease  Description: Target End Date:  Prior to discharge or change in level of care    1.  Remove potential routes of infection, such as central lines and urinary catheter  2.  Follow facility protocol for changing IV tubing and sites  3.  Collaborate with Infectious Disease  4.  Antibiotic therapy per provider order  5.  Note drug effects and monitor for antibiotic toxicity  Outcome: Progressing     Problem: Skin Integrity  Goal: Skin integrity is maintained or improved  Description: Target End Date:  Prior to discharge or change in level of care    Document interventions on Skin Risk/Farzad flowsheet groups and corresponding LDA    1.  Assess and monitor skin integrity, appearance and/or temperature  2.  Assess risk factors for impaired skin integrity and/or pressures ulcers  3.  Implement precautions to protect skin integrity in collaboration with interdisciplinary team  4.  Implement pressure ulcer prevention protocol if at risk for skin breakdown  5.  Confirm wound care consult if at risk for skin breakdown  6.  Ensure patient use of pressure relieving devices  (Low air loss bed, waffle overlay, heel protectors, ROHO cushion, etc)  Outcome: Progressing       Patient is not progressing towards the following goals:

## 2022-12-29 NOTE — DISCHARGE PLANNING
Attempted to talk to pt but pt was refusing to answer any questions. She covered her face with a blanket while CM was trying to ask questions. RN was at bedside and stated that pt is refusing care.     CM called son Gilbert but he is unable to help since he lives in [place]. CM called daughter Althea Tel # [phone] but Althea said that there is noone in their family that is willing to help pt. They know she is homeless.     Discussed with Dr. Owens who said that pt might need homeO2 upon discharge.     Care Transition Team Assessment    Information Source  Orientation Level: Other (Comment) (Pt was uncooperative and refused to answer questions)  Information Given By: Other (Comments)  Who is responsible for making decisions for patient? : Patient    Elopement Risk  Legal Hold: No  Ambulatory or Self Mobile in Wheelchair: No-Not an Elopement Risk    Interdisciplinary Discharge Planning  Does Admitting Nurse Feel This Could be a Complex Discharge?: No  Primary Care Physician: No pcp  Lives with - Patient's Self Care Capacity: Other (Comments)  Patient or legal guardian wants to designate a caregiver: No  Support Systems: Shelter  Housing / Facility: Homeless  Name of Care Facility: [facility]  Able to Return to Previous ADL's: Yes  Mobility Issues: No  Prior Services: None  Patient Prefers to be Discharged to:: Shelter  Assistance Needed: Yes  Durable Medical Equipment: Not Applicable    Discharge Preparedness  What is your plan after discharge?: Uncertain - pending medical team collaboration  What are your discharge supports?: Other (comment) (none)  Prior Functional Level: Ambulatory    Functional Assesment  Prior Functional Level: Ambulatory    Finances  Financial Barriers to Discharge: No  Prescription Coverage: Yes    Vision / Hearing Impairment  Vision Impairment : No  Hearing Impairment : No    Values / Beliefs / Concerns  Values / Beliefs Concerns : No    Advance Directive  Advance Directive?:  None    Domestic Abuse  Have you ever been the victim of abuse or violence?: No  Physical Abuse or Sexual Abuse: No  Verbal Abuse or Emotional Abuse: No  Possible Abuse/Neglect Reported to:: Not Applicable    Discharge Risks or Barriers  Discharge risks or barriers?: Homeless / couch surfing  Patient risk factors: Cognitive / sensory / physical deficit, Homeless    Anticipated Discharge Information  Discharge Disposition: Discharged to home/self care (01)

## 2022-12-30 ENCOUNTER — PHARMACY VISIT (OUTPATIENT)
Dept: PHARMACY | Facility: MEDICAL CENTER | Age: 87
End: 2022-12-30
Payer: COMMERCIAL

## 2022-12-30 PROCEDURE — 700102 HCHG RX REV CODE 250 W/ 637 OVERRIDE(OP): Performed by: HOSPITALIST

## 2022-12-30 PROCEDURE — 700102 HCHG RX REV CODE 250 W/ 637 OVERRIDE(OP): Performed by: GENERAL PRACTICE

## 2022-12-30 PROCEDURE — A9270 NON-COVERED ITEM OR SERVICE: HCPCS | Performed by: GENERAL PRACTICE

## 2022-12-30 PROCEDURE — A9270 NON-COVERED ITEM OR SERVICE: HCPCS | Performed by: HOSPITALIST

## 2022-12-30 PROCEDURE — RXMED WILLOW AMBULATORY MEDICATION CHARGE: Performed by: HOSPITALIST

## 2022-12-30 PROCEDURE — 99239 HOSP IP/OBS DSCHRG MGMT >30: CPT | Performed by: HOSPITALIST

## 2022-12-30 RX ORDER — AMOXICILLIN AND CLAVULANATE POTASSIUM 875; 125 MG/1; MG/1
1 TABLET, FILM COATED ORAL EVERY 12 HOURS
Qty: 8 TABLET | Refills: 0 | Status: SHIPPED | OUTPATIENT
Start: 2022-12-30 | End: 2023-03-02

## 2022-12-30 RX ADMIN — SENNOSIDES AND DOCUSATE SODIUM 2 TABLET: 50; 8.6 TABLET ORAL at 05:18

## 2022-12-30 RX ADMIN — GUAIFENESIN 1200 MG: 600 TABLET, EXTENDED RELEASE ORAL at 05:17

## 2022-12-30 RX ADMIN — AMOXICILLIN AND CLAVULANATE POTASSIUM 1 TABLET: 875; 125 TABLET, FILM COATED ORAL at 05:18

## 2022-12-30 NOTE — DOCUMENTATION QUERY
[facility]                                                                       Query Response Note      PATIENT:               BABS LAU  ACCT #:                  [id number]  MRN:                     [id number]  :                      1933  ADMIT DATE:       2022 1:38 AM  DISCH DATE:          RESPONDING  PROVIDER #:        [id number]           QUERY TEXT:    Please clarify  the relationship, if any, between  the Sepsis and E. coli.    NOTE:  If an appropriate response is not listed below, please respond with a new note.      The patient's Clinical Indicators include:  Urine Cx positive for E. coli on . UA on admission noted cloudy appearance, moderate leukocytes, many bacteria, and WBC: .    Treatments include: Augmentin, Unasyn, Azithromycin, and LR Bolus.    Risk factors include: dx UTI, dx PNA, and dx Acidosis.    Thank you,  Zacarias Talamantes RN, BSN  Clinical   Connect via [employer]  Options provided:   -- Sepsis 2/2 UTI is due to or associated with E. coli   -- Sepsis 2/2 is not due to or associated with E. coli   -- Other explanation, Please specify   -- Unable to determine      Query created by: Zacarias Talamantes on 2022 8:51 AM    RESPONSE TEXT:    Sepsis 2/2 UTI is due to or associated with E. coli          Electronically signed by:  CHASE GARCIA DO 2022 10:55 AM

## 2022-12-30 NOTE — RESPIRATORY CARE
COPD EDUCATION by COPD CLINICAL EDUCATOR  12/30/2022 at 9:27 AM by Ena Rodríguez RRT     Patient reviewed by COPD education team. Patient does not have a diagnosis history of COPD, there is a history of Asthma. Patient never smoked and does not use respiratory medications. Therefore the patient does not qualify for the COPD program.

## 2022-12-30 NOTE — CARE PLAN
The patient is Watcher - Medium risk of patient condition declining or worsening    Shift Goals  Clinical Goals: Rest, maintain stable hemodynamics  Patient Goals: Go home  Family Goals: ALEX    Progress made toward(s) clinical / shift goals:    Problem: Hemodynamics  Goal: Patient's hemodynamics, fluid balance and neurologic status will be stable or improve  Outcome: Progressing     Problem: Respiratory  Goal: Patient will achieve/maintain optimum respiratory ventilation and gas exchange  Outcome: Progressing     Problem: Skin Integrity  Goal: Skin integrity is maintained or improved  Outcome: Progressing     Problem: Fall Risk  Goal: Patient will remain free from falls  Outcome: Progressing     Problem: Pain - Standard  Goal: Alleviation of pain or a reduction in pain to the patient’s comfort goal  Outcome: Progressing       Patient is not progressing towards the following goals:

## 2022-12-30 NOTE — DISCHARGE PLANNING
Patient discharging back to our place. LSW met with patient at bedside. Patient confirmed dc destination. Cab voucher given to bedside our place.

## 2022-12-31 ENCOUNTER — APPOINTMENT (OUTPATIENT)
Dept: RADIOLOGY | Facility: MEDICAL CENTER | Age: 87
DRG: 194 | End: 2022-12-31
Attending: EMERGENCY MEDICINE
Payer: MEDICARE

## 2022-12-31 ENCOUNTER — HOSPITAL ENCOUNTER (INPATIENT)
Facility: MEDICAL CENTER | Age: 87
LOS: 17 days | DRG: 194 | End: 2023-01-17
Attending: EMERGENCY MEDICINE | Admitting: INTERNAL MEDICINE
Payer: MEDICARE

## 2022-12-31 DIAGNOSIS — J18.9 COMMUNITY ACQUIRED PNEUMONIA OF RIGHT LUNG, UNSPECIFIED PART OF LUNG: ICD-10-CM

## 2022-12-31 DIAGNOSIS — J11.1 INFLUENZA: ICD-10-CM

## 2022-12-31 DIAGNOSIS — K21.9 GASTROESOPHAGEAL REFLUX DISEASE WITHOUT ESOPHAGITIS: ICD-10-CM

## 2022-12-31 PROBLEM — J96.01 ACUTE RESPIRATORY FAILURE WITH HYPOXIA (HCC): Status: ACTIVE | Noted: 2022-12-31

## 2022-12-31 PROBLEM — E87.6 HYPOKALEMIA: Status: ACTIVE | Noted: 2022-12-31

## 2022-12-31 PROBLEM — S02.2XXA CLOSED FRACTURE OF NASAL BONE: Status: ACTIVE | Noted: 2022-12-31

## 2022-12-31 PROBLEM — Z71.89 ADVANCE CARE PLANNING: Status: ACTIVE | Noted: 2022-12-31

## 2022-12-31 LAB
ANION GAP SERPL CALC-SCNC: 10 MMOL/L (ref 7–16)
ANISOCYTOSIS BLD QL SMEAR: ABNORMAL
BASOPHILS # BLD AUTO: 0 % (ref 0–1.8)
BASOPHILS # BLD: 0 K/UL (ref 0–0.12)
BUN SERPL-MCNC: 6 MG/DL (ref 8–22)
CALCIUM SERPL-MCNC: 8.4 MG/DL (ref 8.5–10.5)
CHLORIDE SERPL-SCNC: 101 MMOL/L (ref 96–112)
CO2 SERPL-SCNC: 26 MMOL/L (ref 20–33)
CREAT SERPL-MCNC: 0.49 MG/DL (ref 0.5–1.4)
EKG IMPRESSION: NORMAL
EOSINOPHIL # BLD AUTO: 0.09 K/UL (ref 0–0.51)
EOSINOPHIL NFR BLD: 0.8 % (ref 0–6.9)
ERYTHROCYTE [DISTWIDTH] IN BLOOD BY AUTOMATED COUNT: 45.2 FL (ref 35.9–50)
FLUAV RNA SPEC QL NAA+PROBE: NEGATIVE
FLUBV RNA SPEC QL NAA+PROBE: NEGATIVE
GFR SERPLBLD CREATININE-BSD FMLA CKD-EPI: 90 ML/MIN/1.73 M 2
GLUCOSE SERPL-MCNC: 96 MG/DL (ref 65–99)
HCT VFR BLD AUTO: 37.2 % (ref 37–47)
HGB BLD-MCNC: 12.6 G/DL (ref 12–16)
LACTATE SERPL-SCNC: 1.3 MMOL/L (ref 0.5–2)
LYMPHOCYTES # BLD AUTO: 2.92 K/UL (ref 1–4.8)
LYMPHOCYTES NFR BLD: 25.6 % (ref 22–41)
MACROCYTES BLD QL SMEAR: ABNORMAL
MANUAL DIFF BLD: NORMAL
MCH RBC QN AUTO: 30.9 PG (ref 27–33)
MCHC RBC AUTO-ENTMCNC: 33.9 G/DL (ref 33.6–35)
MCV RBC AUTO: 91.2 FL (ref 81.4–97.8)
METAMYELOCYTES NFR BLD MANUAL: 0.8 %
MONOCYTES # BLD AUTO: 0.86 K/UL (ref 0–0.85)
MONOCYTES NFR BLD AUTO: 7.5 % (ref 0–13.4)
MORPHOLOGY BLD-IMP: NORMAL
NEUTROPHILS # BLD AUTO: 7.44 K/UL (ref 2–7.15)
NEUTROPHILS NFR BLD: 65.3 % (ref 44–72)
NRBC # BLD AUTO: 0.02 K/UL
NRBC BLD-RTO: 0.2 /100 WBC
NT-PROBNP SERPL IA-MCNC: 710 PG/ML (ref 0–125)
PLATELET # BLD AUTO: 341 K/UL (ref 164–446)
PLATELET BLD QL SMEAR: NORMAL
PMV BLD AUTO: 9.8 FL (ref 9–12.9)
POTASSIUM SERPL-SCNC: 3.3 MMOL/L (ref 3.6–5.5)
PROCALCITONIN SERPL-MCNC: 3.88 NG/ML
RBC # BLD AUTO: 4.08 M/UL (ref 4.2–5.4)
RBC BLD AUTO: PRESENT
RSV RNA SPEC QL NAA+PROBE: NEGATIVE
SARS-COV-2 RNA RESP QL NAA+PROBE: NOTDETECTED
SODIUM SERPL-SCNC: 137 MMOL/L (ref 135–145)
SPECIMEN SOURCE: NORMAL
TROPONIN T SERPL-MCNC: 14 NG/L (ref 6–19)
WBC # BLD AUTO: 11.4 K/UL (ref 4.8–10.8)

## 2022-12-31 PROCEDURE — 70486 CT MAXILLOFACIAL W/O DYE: CPT

## 2022-12-31 PROCEDURE — 85007 BL SMEAR W/DIFF WBC COUNT: CPT

## 2022-12-31 PROCEDURE — 71045 X-RAY EXAM CHEST 1 VIEW: CPT

## 2022-12-31 PROCEDURE — 90715 TDAP VACCINE 7 YRS/> IM: CPT | Performed by: EMERGENCY MEDICINE

## 2022-12-31 PROCEDURE — 90471 IMMUNIZATION ADMIN: CPT

## 2022-12-31 PROCEDURE — 72125 CT NECK SPINE W/O DYE: CPT

## 2022-12-31 PROCEDURE — 99285 EMERGENCY DEPT VISIT HI MDM: CPT

## 2022-12-31 PROCEDURE — 87040 BLOOD CULTURE FOR BACTERIA: CPT

## 2022-12-31 PROCEDURE — 84145 PROCALCITONIN (PCT): CPT

## 2022-12-31 PROCEDURE — 70450 CT HEAD/BRAIN W/O DYE: CPT

## 2022-12-31 PROCEDURE — 83880 ASSAY OF NATRIURETIC PEPTIDE: CPT

## 2022-12-31 PROCEDURE — 700102 HCHG RX REV CODE 250 W/ 637 OVERRIDE(OP): Performed by: INTERNAL MEDICINE

## 2022-12-31 PROCEDURE — 80048 BASIC METABOLIC PNL TOTAL CA: CPT

## 2022-12-31 PROCEDURE — A9270 NON-COVERED ITEM OR SERVICE: HCPCS | Performed by: INTERNAL MEDICINE

## 2022-12-31 PROCEDURE — 93005 ELECTROCARDIOGRAM TRACING: CPT | Performed by: EMERGENCY MEDICINE

## 2022-12-31 PROCEDURE — 85025 COMPLETE CBC W/AUTO DIFF WBC: CPT

## 2022-12-31 PROCEDURE — 84484 ASSAY OF TROPONIN QUANT: CPT

## 2022-12-31 PROCEDURE — 36415 COLL VENOUS BLD VENIPUNCTURE: CPT

## 2022-12-31 PROCEDURE — C9803 HOPD COVID-19 SPEC COLLECT: HCPCS | Performed by: EMERGENCY MEDICINE

## 2022-12-31 PROCEDURE — 96375 TX/PRO/DX INJ NEW DRUG ADDON: CPT

## 2022-12-31 PROCEDURE — 770006 HCHG ROOM/CARE - MED/SURG/GYN SEMI*

## 2022-12-31 PROCEDURE — 700111 HCHG RX REV CODE 636 W/ 250 OVERRIDE (IP): Performed by: EMERGENCY MEDICINE

## 2022-12-31 PROCEDURE — 0241U HCHG SARS-COV-2 COVID-19 NFCT DS RESP RNA 4 TRGT MIC: CPT

## 2022-12-31 PROCEDURE — 96365 THER/PROPH/DIAG IV INF INIT: CPT

## 2022-12-31 PROCEDURE — 83605 ASSAY OF LACTIC ACID: CPT

## 2022-12-31 PROCEDURE — 3E0234Z INTRODUCTION OF SERUM, TOXOID AND VACCINE INTO MUSCLE, PERCUTANEOUS APPROACH: ICD-10-PCS | Performed by: EMERGENCY MEDICINE

## 2022-12-31 PROCEDURE — 99223 1ST HOSP IP/OBS HIGH 75: CPT | Mod: AI | Performed by: INTERNAL MEDICINE

## 2022-12-31 RX ORDER — POTASSIUM CHLORIDE 20 MEQ/1
40 TABLET, EXTENDED RELEASE ORAL ONCE
Status: ACTIVE | OUTPATIENT
Start: 2022-12-31 | End: 2023-01-01

## 2022-12-31 RX ORDER — ACETAMINOPHEN 325 MG/1
650 TABLET ORAL EVERY 6 HOURS PRN
Status: DISCONTINUED | OUTPATIENT
Start: 2022-12-31 | End: 2023-01-07

## 2022-12-31 RX ORDER — BENZONATATE 100 MG/1
100 CAPSULE ORAL 3 TIMES DAILY PRN
Status: DISCONTINUED | OUTPATIENT
Start: 2022-12-31 | End: 2023-01-17 | Stop reason: HOSPADM

## 2022-12-31 RX ORDER — AMOXICILLIN 250 MG
2 CAPSULE ORAL 2 TIMES DAILY
Status: DISCONTINUED | OUTPATIENT
Start: 2022-12-31 | End: 2023-01-10

## 2022-12-31 RX ORDER — BISACODYL 10 MG
10 SUPPOSITORY, RECTAL RECTAL
Status: DISCONTINUED | OUTPATIENT
Start: 2022-12-31 | End: 2023-01-17 | Stop reason: HOSPADM

## 2022-12-31 RX ORDER — ONDANSETRON 2 MG/ML
4 INJECTION INTRAMUSCULAR; INTRAVENOUS EVERY 4 HOURS PRN
Status: DISCONTINUED | OUTPATIENT
Start: 2022-12-31 | End: 2023-01-17 | Stop reason: HOSPADM

## 2022-12-31 RX ORDER — AZITHROMYCIN 500 MG/1
500 INJECTION, POWDER, LYOPHILIZED, FOR SOLUTION INTRAVENOUS ONCE
Status: COMPLETED | OUTPATIENT
Start: 2022-12-31 | End: 2022-12-31

## 2022-12-31 RX ORDER — POLYETHYLENE GLYCOL 3350 17 G/17G
1 POWDER, FOR SOLUTION ORAL
Status: DISCONTINUED | OUTPATIENT
Start: 2022-12-31 | End: 2023-01-17 | Stop reason: HOSPADM

## 2022-12-31 RX ORDER — ONDANSETRON 4 MG/1
4 TABLET, ORALLY DISINTEGRATING ORAL EVERY 4 HOURS PRN
Status: DISCONTINUED | OUTPATIENT
Start: 2022-12-31 | End: 2023-01-17 | Stop reason: HOSPADM

## 2022-12-31 RX ORDER — GUAIFENESIN/DEXTROMETHORPHAN 100-10MG/5
10 SYRUP ORAL EVERY 6 HOURS PRN
Status: DISCONTINUED | OUTPATIENT
Start: 2022-12-31 | End: 2023-01-17 | Stop reason: HOSPADM

## 2022-12-31 RX ORDER — ALBUTEROL SULFATE 90 UG/1
2 AEROSOL, METERED RESPIRATORY (INHALATION) EVERY 4 HOURS PRN
Status: DISCONTINUED | OUTPATIENT
Start: 2022-12-31 | End: 2023-01-17 | Stop reason: HOSPADM

## 2022-12-31 RX ORDER — AZITHROMYCIN 250 MG/1
500 TABLET, FILM COATED ORAL DAILY
Status: DISCONTINUED | OUTPATIENT
Start: 2023-01-01 | End: 2023-01-01

## 2022-12-31 RX ORDER — CEFTRIAXONE 1 G/1
1000 INJECTION, POWDER, FOR SOLUTION INTRAMUSCULAR; INTRAVENOUS ONCE
Status: COMPLETED | OUTPATIENT
Start: 2022-12-31 | End: 2022-12-31

## 2022-12-31 RX ADMIN — AZITHROMYCIN MONOHYDRATE 500 MG: 500 INJECTION, POWDER, LYOPHILIZED, FOR SOLUTION INTRAVENOUS at 18:22

## 2022-12-31 RX ADMIN — CEFTRIAXONE SODIUM 1000 MG: 1 INJECTION, POWDER, FOR SOLUTION INTRAMUSCULAR; INTRAVENOUS at 18:21

## 2022-12-31 RX ADMIN — CLOSTRIDIUM TETANI TOXOID ANTIGEN (FORMALDEHYDE INACTIVATED), CORYNEBACTERIUM DIPHTHERIAE TOXOID ANTIGEN (FORMALDEHYDE INACTIVATED), BORDETELLA PERTUSSIS TOXOID ANTIGEN (GLUTARALDEHYDE INACTIVATED), BORDETELLA PERTUSSIS FILAMENTOUS HEMAGGLUTININ ANTIGEN (FORMALDEHYDE INACTIVATED), BORDETELLA PERTUSSIS PERTACTIN ANTIGEN, AND BORDETELLA PERTUSSIS FIMBRIAE 2/3 ANTIGEN 0.5 ML: 5; 2; 2.5; 5; 3; 5 INJECTION, SUSPENSION INTRAMUSCULAR at 16:22

## 2022-12-31 ASSESSMENT — COGNITIVE AND FUNCTIONAL STATUS - GENERAL
CLIMB 3 TO 5 STEPS WITH RAILING: A LITTLE
WALKING IN HOSPITAL ROOM: A LITTLE
MOVING FROM LYING ON BACK TO SITTING ON SIDE OF FLAT BED: A LITTLE
SUGGESTED CMS G CODE MODIFIER MOBILITY: CJ
DAILY ACTIVITIY SCORE: 22
SUGGESTED CMS G CODE MODIFIER DAILY ACTIVITY: CJ
MOBILITY SCORE: 20
STANDING UP FROM CHAIR USING ARMS: A LITTLE
TOILETING: A LITTLE
HELP NEEDED FOR BATHING: A LITTLE

## 2022-12-31 ASSESSMENT — ENCOUNTER SYMPTOMS
PALPITATIONS: 0
HEADACHES: 1
FALLS: 1
ABDOMINAL PAIN: 0
VOMITING: 0
FEVER: 0
LOSS OF CONSCIOUSNESS: 0
COUGH: 1
NAUSEA: 0
BLURRED VISION: 0
DOUBLE VISION: 0
SEIZURES: 0
CHILLS: 0
SHORTNESS OF BREATH: 1
SORE THROAT: 0
HALLUCINATIONS: 0
DIARRHEA: 0

## 2022-12-31 ASSESSMENT — LIFESTYLE VARIABLES
CONSUMPTION TOTAL: NEGATIVE
HOW MANY TIMES IN THE PAST YEAR HAVE YOU HAD 5 OR MORE DRINKS IN A DAY: 0
TOTAL SCORE: 0
ALCOHOL_USE: NO
AVERAGE NUMBER OF DAYS PER WEEK YOU HAVE A DRINK CONTAINING ALCOHOL: 0
EVER FELT BAD OR GUILTY ABOUT YOUR DRINKING: NO
TOTAL SCORE: 0
SUBSTANCE_ABUSE: 0
HAVE PEOPLE ANNOYED YOU BY CRITICIZING YOUR DRINKING: NO
EVER HAD A DRINK FIRST THING IN THE MORNING TO STEADY YOUR NERVES TO GET RID OF A HANGOVER: NO
HAVE YOU EVER FELT YOU SHOULD CUT DOWN ON YOUR DRINKING: NO
TOTAL SCORE: 0
ON A TYPICAL DAY WHEN YOU DRINK ALCOHOL HOW MANY DRINKS DO YOU HAVE: 0

## 2022-12-31 ASSESSMENT — FIBROSIS 4 INDEX
FIB4 SCORE: 1.65
FIB4 SCORE: 2.111111111111111111

## 2022-12-31 ASSESSMENT — PATIENT HEALTH QUESTIONNAIRE - PHQ9
2. FEELING DOWN, DEPRESSED, IRRITABLE, OR HOPELESS: NOT AT ALL
SUM OF ALL RESPONSES TO PHQ9 QUESTIONS 1 AND 2: 0
1. LITTLE INTEREST OR PLEASURE IN DOING THINGS: NOT AT ALL

## 2022-12-31 ASSESSMENT — PAIN DESCRIPTION - PAIN TYPE: TYPE: ACUTE PAIN

## 2022-12-31 NOTE — ED TRIAGE NOTES
Chief Complaint   Patient presents with    T-5000 GLF     Pt BIB [facility] from [facility]. Pt was sleeping, fell out of bed, hit face on ground. Unknown LOC. Takes ASA daily. Abrasion & swelling to bridge of nose. Denies head or neck pain. Pt recently d/c'd after hospitalization with pneumonia.      Pt BIB [facility] for above. A&Ox4, GCS 15. Pt placed on 3L by [facility]. RA sat 90%.     Pt to 14H, placed on monitor, report given to Joan WARREN.

## 2022-12-31 NOTE — ED NOTES
SANDRA Jason - [phone] is willing to given patient ride back to [facility]

## 2022-12-31 NOTE — DISCHARGE SUMMARY
Discharge Summary    CHIEF COMPLAINT ON ADMISSION  Chief Complaint   Patient presents with    Shortness of Breath     PT from [facility], reports SOB x 1 week. PT reports productive cough x 1 month, reports mucous is gray in color.        Reason for Admission  Difficulty Breathing     Admission Date  12/28/2022    CODE STATUS  Prior    HPI & HOSPITAL COURSE  This is a 89 y.o. female with history of asthma.  Patient was at the [facility] shelter, with worsening shortness of breath, and was therefore brought to the emergency room.  In the ED she had a chest x-ray showing a possible right middle and lower lung infiltrate, as well as a white count of 13.  Her UA indicated UTI.  Patient was admitted to the IMCU with diagnosis of sepsis secondary to pneumonia, acute hypoxic respiratory failure on high flow nasal cannula.    In the IMCU, the patient's UA eventually grew out E. coli her blood cultures remain negative.  She was treated with ampicillin sulbactam.  As regards her pneumonia she was treated with Zithromax and ampicillin sulbactam.  Her respiratory status improved steadily, and on the day of discharge she is on room air and maintain her sats well.    We did try to find placement for the patient however she refused stating that she wished to go back to the [facility].      Therefore, she is discharged in good and stable condition to home with close outpatient follow-up.    The patient met 2-midnight criteria for an inpatient stay at the time of discharge.    Discharge Date  12/30/2022    FOLLOW UP ITEMS POST DISCHARGE  With PCP    DISCHARGE DIAGNOSES  Principal Problem:    Sepsis (HCC) POA: Yes  Active Problems:    Acute respiratory failure with hypoxia (HCC) POA: Yes    Pneumonia due to infectious organism POA: Yes    DNR (do not resuscitate) POA: Yes    UTI (urinary tract infection) POA: Unknown    Lactic acid acidosis POA: Unknown  Resolved Problems:    * No resolved hospital problems. *      FOLLOW  "UP  No future appointments.  [facility] ([facility]) - Primary Care and Family Medicine  [address] [place] [id number]  [phone]  Schedule an appointment as soon as possible for a visit        MEDICATIONS ON DISCHARGE     Medication List        START taking these medications        Instructions   amoxicillin-clavulanate 875-125 MG Tabs  Commonly known as: AUGMENTIN   Take 1 Tablet by mouth every 12 hours.  Dose: 1 Tablet            CONTINUE taking these medications        Instructions   aspirin 325 MG Tabs  Commonly known as: ASA   Take 325 mg by mouth every 6 hours as needed for Mild Pain.  Dose: 325 mg     PRIMATENE MIST INH   Inhale 1 Inhalation 1 time a day as needed (Shortness of breath).  Dose: 1 Inhalation              Allergies  Allergies   Allergen Reactions    Codeine Anxiety     \"I get crazy\"       DIET  No orders of the defined types were placed in this encounter.      ACTIVITY  As tolerated.  Weight bearing as tolerated    CONSULTATIONS      PROCEDURES      LABORATORY  Lab Results   Component Value Date    SODIUM 131 (L) 12/29/2022    POTASSIUM 3.3 (L) 12/29/2022    CHLORIDE 94 (L) 12/29/2022    CO2 24 12/29/2022    GLUCOSE 78 12/29/2022    BUN 32 (H) 12/29/2022    CREATININE 0.72 12/29/2022        Lab Results   Component Value Date    WBC 14.2 (H) 12/29/2022    HEMOGLOBIN 13.4 12/29/2022    HEMATOCRIT 39.5 12/29/2022    PLATELETCT 322 12/29/2022        Total time of the discharge process exceeds 32 minutes.  Most of that time spent with the patient reviewing discharge planning and instructions  "

## 2023-01-01 LAB
ALBUMIN SERPL BCP-MCNC: 2.4 G/DL (ref 3.2–4.9)
BASOPHILS # BLD AUTO: 0 % (ref 0–1.8)
BASOPHILS # BLD: 0 K/UL (ref 0–0.12)
BUN SERPL-MCNC: 6 MG/DL (ref 8–22)
BURR CELLS BLD QL SMEAR: NORMAL
CALCIUM ALBUM COR SERPL-MCNC: 9.4 MG/DL (ref 8.5–10.5)
CALCIUM SERPL-MCNC: 8.1 MG/DL (ref 8.5–10.5)
CHLORIDE SERPL-SCNC: 102 MMOL/L (ref 96–112)
CO2 SERPL-SCNC: 26 MMOL/L (ref 20–33)
CREAT SERPL-MCNC: 0.5 MG/DL (ref 0.5–1.4)
EOSINOPHIL # BLD AUTO: 0.07 K/UL (ref 0–0.51)
EOSINOPHIL NFR BLD: 0.8 % (ref 0–6.9)
ERYTHROCYTE [DISTWIDTH] IN BLOOD BY AUTOMATED COUNT: 46.8 FL (ref 35.9–50)
GFR SERPLBLD CREATININE-BSD FMLA CKD-EPI: 89 ML/MIN/1.73 M 2
GLUCOSE SERPL-MCNC: 107 MG/DL (ref 65–99)
HCT VFR BLD AUTO: 35.9 % (ref 37–47)
HGB BLD-MCNC: 11.7 G/DL (ref 12–16)
HYPOCHROMIA BLD QL SMEAR: ABNORMAL
LYMPHOCYTES # BLD AUTO: 1.09 K/UL (ref 1–4.8)
LYMPHOCYTES NFR BLD: 12 % (ref 22–41)
MAGNESIUM SERPL-MCNC: 1.7 MG/DL (ref 1.5–2.5)
MANUAL DIFF BLD: NORMAL
MCH RBC QN AUTO: 30 PG (ref 27–33)
MCHC RBC AUTO-ENTMCNC: 32.6 G/DL (ref 33.6–35)
MCV RBC AUTO: 92.1 FL (ref 81.4–97.8)
METAMYELOCYTES NFR BLD MANUAL: 1.7 %
MONOCYTES # BLD AUTO: 0.7 K/UL (ref 0–0.85)
MONOCYTES NFR BLD AUTO: 7.7 % (ref 0–13.4)
MORPHOLOGY BLD-IMP: NORMAL
NEUTROPHILS # BLD AUTO: 7.08 K/UL (ref 2–7.15)
NEUTROPHILS NFR BLD: 76.9 % (ref 44–72)
NEUTS BAND NFR BLD MANUAL: 0.9 % (ref 0–10)
NRBC # BLD AUTO: 0.02 K/UL
NRBC BLD-RTO: 0.2 /100 WBC
PHOSPHATE SERPL-MCNC: 2.4 MG/DL (ref 2.5–4.5)
PLATELET # BLD AUTO: 344 K/UL (ref 164–446)
PLATELET BLD QL SMEAR: NORMAL
PMV BLD AUTO: 9.6 FL (ref 9–12.9)
POIKILOCYTOSIS BLD QL SMEAR: NORMAL
POLYCHROMASIA BLD QL SMEAR: NORMAL
POTASSIUM SERPL-SCNC: 3.6 MMOL/L (ref 3.6–5.5)
RBC # BLD AUTO: 3.9 M/UL (ref 4.2–5.4)
RBC BLD AUTO: PRESENT
SODIUM SERPL-SCNC: 135 MMOL/L (ref 135–145)
WBC # BLD AUTO: 9.1 K/UL (ref 4.8–10.8)

## 2023-01-01 PROCEDURE — 700102 HCHG RX REV CODE 250 W/ 637 OVERRIDE(OP): Performed by: INTERNAL MEDICINE

## 2023-01-01 PROCEDURE — 85025 COMPLETE CBC W/AUTO DIFF WBC: CPT

## 2023-01-01 PROCEDURE — 85007 BL SMEAR W/DIFF WBC COUNT: CPT

## 2023-01-01 PROCEDURE — 99233 SBSQ HOSP IP/OBS HIGH 50: CPT | Performed by: INTERNAL MEDICINE

## 2023-01-01 PROCEDURE — A9270 NON-COVERED ITEM OR SERVICE: HCPCS | Performed by: INTERNAL MEDICINE

## 2023-01-01 PROCEDURE — 80069 RENAL FUNCTION PANEL: CPT

## 2023-01-01 PROCEDURE — 770006 HCHG ROOM/CARE - MED/SURG/GYN SEMI*

## 2023-01-01 PROCEDURE — 83735 ASSAY OF MAGNESIUM: CPT

## 2023-01-01 PROCEDURE — 36415 COLL VENOUS BLD VENIPUNCTURE: CPT

## 2023-01-01 RX ORDER — MAGNESIUM SULFATE 1 G/100ML
1 INJECTION INTRAVENOUS ONCE
Status: DISCONTINUED | OUTPATIENT
Start: 2023-01-01 | End: 2023-01-02

## 2023-01-01 RX ORDER — HYDROMORPHONE HYDROCHLORIDE 1 MG/ML
1 INJECTION, SOLUTION INTRAMUSCULAR; INTRAVENOUS; SUBCUTANEOUS ONCE
Status: DISCONTINUED | OUTPATIENT
Start: 2023-01-01 | End: 2023-01-02

## 2023-01-01 RX ORDER — OXYMETAZOLINE HYDROCHLORIDE 0.05 G/100ML
2 SPRAY NASAL 2 TIMES DAILY
Status: DISPENSED | OUTPATIENT
Start: 2023-01-01 | End: 2023-01-04

## 2023-01-01 RX ORDER — AZITHROMYCIN 250 MG/1
500 TABLET, FILM COATED ORAL DAILY
Status: COMPLETED | OUTPATIENT
Start: 2023-01-02 | End: 2023-01-02

## 2023-01-01 RX ADMIN — ACETAMINOPHEN 650 MG: 325 TABLET, FILM COATED ORAL at 01:57

## 2023-01-01 RX ADMIN — AZITHROMYCIN MONOHYDRATE 500 MG: 250 TABLET ORAL at 05:40

## 2023-01-01 ASSESSMENT — ENCOUNTER SYMPTOMS
DIZZINESS: 0
FEVER: 0
SHORTNESS OF BREATH: 0
CONSTIPATION: 0
HEADACHES: 0
CHILLS: 0
WEAKNESS: 1
VOMITING: 0
NAUSEA: 0
SORE THROAT: 0
BACK PAIN: 0
DIARRHEA: 0
ABDOMINAL PAIN: 0
COUGH: 1
PALPITATIONS: 0

## 2023-01-01 ASSESSMENT — PAIN DESCRIPTION - PAIN TYPE
TYPE: ACUTE PAIN

## 2023-01-01 NOTE — ED NOTES
RN went in to patient room to updated her and found both IV lines on the floor. Pt is refusing for RN to restart the IV lines.

## 2023-01-01 NOTE — ASSESSMENT & PLAN NOTE
Mildly displaced and angulated right nasal bone fracture    Spoke with ENT, tried to perform bedside reduction but patient declined.  PT/OT  Pain regimen ordered

## 2023-01-01 NOTE — ED NOTES
COVID swab collected, patient placed on oxygen and labs sent down. She is resting on stretcher in NAD, will continue to monitor

## 2023-01-01 NOTE — THERAPY
Physical Therapy Contact Note    Patient Name: Alyssa Funez  Age:  89 y.o., Sex:  female  Medical Record #: [id number]  Today's Date: 1/1/2023    Discussed missed therapy with    01/01/23 1318   Initial Contact Note    Initial Contact Note Order Received and Verified, Physical Therapy Evaluation in Progress with Full Report to Follow.   Interdisciplinary Plan of Care Collaboration   IDT Collaboration with  Nursing   Patient Position at End of Therapy In Bed;Bed Alarm On   Collaboration Comments Attempted PT eval however pt adamately refusing to move or participate with therapy at this time despite maximal efforts to encourage patient and educate patient on benefits of therapy. Unable to complete PT eval due to pt refusal. RN notified.   Session Information   Date / Session Number  1/1-(EVAL) refused       Shakira Freedman, PT,DPT

## 2023-01-01 NOTE — ED NOTES
Care form Joan WARREN.  Pt escorted to the bathroom with this RN. Does required 1 person RN to help steady her gait.

## 2023-01-01 NOTE — H&P
Hospital Medicine History & Physical Note    Date of Service  12/31/2022    Primary Care Physician  Pcp Pt States None    Code Status  DNAR/DNI    Consults: ENT    Chief Complaint  Chief Complaint   Patient presents with    T-5000 GLF     Pt DARCY FLORES from [facility]. Pt was sleeping, fell out of bed, hit face on ground. Unknown LOC. Takes ASA daily. Abrasion & swelling to bridge of nose. Denies head or neck pain. Pt recently d/c'd after hospitalization with pneumonia.        History of Presenting Illness  Alyssa Funez is a 89 y.o. female who presented 12/31/2022 with fall.  Patient presents after experiencing ground-level fall at the [facility] today.  Patient states that she rolled out of bed while sleeping.  Patient has trauma to face and is unable to state if she experienced loss of consciousness.  Endorses cough, subjective shortness of breath.  Patient additionally endorses headache and some mild abdominal discomfort. CXR demonstrated concern for right-sided consolidation.  Patient has elevated procalcitonin as well as a leukocytosis.  Imaging demonstrated nasal bone fracture.  ENT was consulted and will evaluate patient in the morning.  Patient was started on IV antibiotics for community-acquired pneumonia.  Discussed case with ER provider and patient will be admitted for further work-up and monitoring.    I discussed the plan of care with patient and ER provider .    Review of Systems  Review of Systems   Constitutional:  Negative for chills and fever.   HENT:  Positive for congestion and hearing loss. Negative for sore throat.    Eyes:  Negative for blurred vision and double vision.   Respiratory:  Positive for cough and shortness of breath.    Cardiovascular:  Negative for chest pain and palpitations.   Gastrointestinal:  Negative for abdominal pain, diarrhea, nausea and vomiting.   Genitourinary:  Negative for dysuria and frequency.   Musculoskeletal:  Positive for falls and joint pain.   Skin:   "Negative for rash.   Neurological:  Positive for headaches. Negative for seizures and loss of consciousness.   Psychiatric/Behavioral:  Negative for hallucinations and substance abuse.      Past Medical History   has a past medical history of Asthma, Breath shortness, H/O: hysterectomy, MEDICAL HOME, Psychiatric disorder, and S/P lobectomy of lung (1968).    Surgical History   has a past surgical history that includes orif, fracture, tibia, plateau (12/3/2009); gyn surgery; other; ercp in or (11/29/2011); pr removal of lung,segmentectomy (1960); orif, femur (1993); orif, fracture, tibia (1993); and hysterectomy radical (1960).     Family History  family history is not on file.   Family history reviewed with patient. There is no family history that is pertinent to the chief complaint.     Social History   reports that she has never smoked. She has never used smokeless tobacco. She reports that she does not drink alcohol and does not use drugs.    Allergies  Allergies   Allergen Reactions    Prednisone Anaphylaxis    Avelox [Moxifloxacin Hcl In Nacl] Hives    Codeine Vomiting    Morphine Vomiting     Pt informed during admission interview that she gets \"terribly sick\" , violently nausea and vomiting. Present note for updating allergy status.    Cimetidine Rash       Medications  None       Physical Exam  Temp:  [36.9 °C (98.4 °F)] 36.9 °C (98.4 °F)  Pulse:  [67-74] 74  Resp:  [17-22] 22  BP: ()/(52-58) 99/58  SpO2:  [86 %-95 %] 95 %  Blood Pressure : 99/58   Temperature: 36.9 °C (98.4 °F)   Pulse: 74   Respiration: (!) 22   Pulse Oximetry: 95 %       Physical Exam  Vitals and nursing note reviewed.   Constitutional:       General: She is not in acute distress.     Appearance: She is normal weight. She is ill-appearing. She is not toxic-appearing.   HENT:      Head: Normocephalic.      Nose: Congestion present.      Comments: Abrasion to nose  Structural abnormality to nose     Mouth/Throat:      Mouth: Mucous " membranes are moist.      Pharynx: No oropharyngeal exudate.   Eyes:      General: No scleral icterus.     Pupils: Pupils are equal, round, and reactive to light.   Cardiovascular:      Rate and Rhythm: Normal rate and regular rhythm.      Heart sounds: No murmur heard.  Pulmonary:      Breath sounds: Rhonchi present. No wheezing.   Abdominal:      Palpations: Abdomen is soft.      Tenderness: There is no abdominal tenderness. There is no guarding or rebound.   Musculoskeletal:         General: No swelling or deformity.   Skin:     Coloration: Skin is not jaundiced.      Findings: No bruising.   Neurological:      General: No focal deficit present.      Mental Status: She is alert.   Psychiatric:         Mood and Affect: Mood normal.       Laboratory:  Recent Labs     12/31/22  1654   WBC 11.4*   RBC 4.08*   HEMOGLOBIN 12.6   HEMATOCRIT 37.2   MCV 91.2   MCH 30.9   MCHC 33.9   RDW 45.2   PLATELETCT 341   MPV 9.8     Recent Labs     12/31/22  1654   SODIUM 137   POTASSIUM 3.3*   CHLORIDE 101   CO2 26   GLUCOSE 96   BUN 6*   CREATININE 0.49*   CALCIUM 8.4*     Recent Labs     12/31/22  1654   GLUCOSE 96         Recent Labs     12/31/22  1654   NTPROBNP 710*         Recent Labs     12/31/22  1654   TROPONINT 14       Imaging:  DX-CHEST-PORTABLE (1 VIEW)   Final Result      1.  Extensive right perihilar and lower lobe airspace disease with bronchial wall thickening. Findings could be due to contusion or multifocal pneumonia.   2.  Mildly enlarged cardiac silhouette with possible changes of the lateral right edema.      CT-CSPINE WITHOUT PLUS RECONS   Final Result      1.  There is no acute fracture of the cervical spine.   2.  There is degenerative disc disease and arthropathy predominantly at the C5-6 and C6-7 levels.   3.  Hazy opacities in the right upper lobe could represent edema or pneumonitis.         CT-MAXILLOFACIAL W/O PLUS RECONS   Final Result      1.  Mildly displaced and angulated right nasal bone  fracture.   2.  Right maxillary sinus disease and some frothy secretions in the ethmoid sphenoid sinuses. Correlate for evidence of sinusitis.      CT-HEAD W/O   Final Result         1.  No acute intracranial abnormality.             X-Ray:  I have personally reviewed the images and compared with prior images.  EKG:  I have personally reviewed the images and compared with prior images.    Assessment/Plan:  Justification for Admission Status  I anticipate this patient will require at least two midnights for appropriate medical management, necessitating inpatient admission because Communicty acquired pneumonia with hypoxia, nasal bone fracture    Patient will need a Med/Surg bed on MEDICAL service .  The need is secondary to Community acquired pneumonia.    Community acquired pneumonia of right lung  Assessment & Plan  Patient presents with cough, hypoxia  Elevated procal  WBC 11.4  Blood cultures pending  Rocephin, Azithro  Supportive care    Acute respiratory failure with hypoxia (HCC)  Assessment & Plan  SpO2 86% on room air    Closed fracture of nasal bone  Assessment & Plan  Mildly displaced and angulated right nasal bone fracture  ENT to consult in AM    Advance care planning  Assessment & Plan  Patient wishes to be DNR/DNI    Hypokalemia  Assessment & Plan  Monitor and replace        VTE prophylaxis: SCDs/TEDs and Xarelto 10 mg daily as prophylaxis

## 2023-01-01 NOTE — HOSPITAL COURSE
This is an 89-year-old female with past medical history of psychiatric disorder, lobectomy in 1968, and asthma who was admitted on 12/31/2022 after sustaining a fall at [facility], significant for acute hypoxemic respiratory failure secondary to CAP and nasal fracture.    ENT was consulted for patient's nasal fracture, she refused fracture to be reduced at bedside.    Patient initially required oxygen, but she has been weaned off of this as she completed her antibiotic course for her pneumonia.  Patient later required 1 L of oxygen, tested positive for influenza, started on Tamiflu.    Patient evaluated by PT/OT with no further recommendations.  This is a difficult discharge to case management is assisting in, given patient is homeless and unable to care for herself, needs some assistance with ADLs, and unable to discharge back to [facility].     Case management assisting in setting up patient with a group home.  QuantiFERON gold negative 1/2022.  COVID swab negative on 1/16.

## 2023-01-01 NOTE — ED NOTES
"2044: Pt refused all PM medications stating \"Dont wake me up.\"  2144: No changes. Pt sleeping.  "

## 2023-01-01 NOTE — ASSESSMENT & PLAN NOTE
SpO2 86% on room air  Rhonchi on HPI exam, RR 22 upon admission  - continue 2LNC, wean as possible given patient is from [facility]  - treating pneumonia.  RESOLVED on RA

## 2023-01-01 NOTE — CARE PLAN
The patient is Stable - Low risk of patient condition declining or worsening    Shift Goals  Clinical Goals: maintain O2 sats %  Patient Goals: rest/comfort    Progress made toward(s) clinical / shift goals:  progressing  Pain controlled at this time .  Instructed on purpose of hospitalization for treatment of pneumonia.    Patient is not progressing towards the following goals:

## 2023-01-01 NOTE — ASSESSMENT & PLAN NOTE
Patient presents with cough, hypoxia  Elevated procal 3.88, pending repeat.  WBC 11.4, now improved.  Blood cultures pending  Azithro. Patient refused new PIV, unable to use ceftriaxone. Adjusted to Augmentin 500mg Q8H (smaller tablet).  Supportive care  RESOLVED

## 2023-01-01 NOTE — ED PROVIDER NOTES
ED Provider Note        CHIEF COMPLAINT  Chief Complaint   Patient presents with    T-5000 GLF     Pt DARCY SARABIASA from [facility]. Pt was sleeping, fell out of bed, hit face on ground. Unknown LOC. Takes ASA daily. Abrasion & swelling to bridge of nose. Denies head or neck pain. Pt recently d/c'd after hospitalization with pneumonia.        EXTERNAL RECORDS REVIEWED  Select: Inpatient Notes -Per most recent discharge summary dated 10/20/2022: Patient was admitted after being found wandering around [place].  She has a history of neurocognitive disorder.  She was treated with ceftriaxone for cystitis.  Her mentation improved and she declined group home placement indicating that she would find lodging on her own.  She was appropriately dressed and groomed, had linear thinking, demonstrated problem solving with utilization of her money to find shelter, and was agreeable to homeless shelter if unable to find a motel.  She was discharged to follow-up as an outpatient.    HPI  LIMITATION TO HISTORY   Select: : None  OUTSIDE HISTORIAN(S):  Select: EMS      Alyssa Funez is a 89 y.o. undomiciled female currently living at the [facility] who presents after a fall from bed.  Patient states that she was sleeping and rolled off the bed which is reportedly standard height.  She hit her face on the ground and is uncertain if she lost consciousness because she was asleep when she fell.  She is complaining of pain in her nose.  She denies any other pain or symptoms. She has had a wet cough recently but denies any fevers, chest pain, shortness of breath. She does not use supplemental oxygen at baseline.    REVIEW OF SYSTEMS  See HPI for further details.  Nose pain.  Cough. All other systems are negative.     PAST MEDICAL HISTORY   has a past medical history of Asthma, Breath shortness, H/O: hysterectomy, MEDICAL HOME, Psychiatric disorder, and S/P lobectomy of lung (1968).    SURGICAL HISTORY   has a past surgical history that  "includes orif, fracture, tibia, plateau (12/3/2009); gyn surgery; other; ercp in or (11/29/2011); removal of lung,segmentectomy (1960); orif, femur (1993); orif, fracture, tibia (1993); and hysterectomy radical (1960).    FAMILY HISTORY  History reviewed. No pertinent family history.    SOCIAL HISTORY  Social History     Tobacco Use    Smoking status: Never    Smokeless tobacco: Never   Substance and Sexual Activity    Alcohol use: No    Drug use: No    Sexual activity: Not on file       CURRENT MEDICATIONS  Home Medications       Reviewed by Stephie Salazar R.N. (Registered Nurse) on 12/31/22 at 1551  Med List Status: Partial     Medication Last Dose Status   acetaminophen (TYLENOL) 325 MG Tab  Active   albuterol 108 (90 Base) MCG/ACT Aero Soln inhalation aerosol  Active   levothyroxine (SYNTHROID) 50 MCG Tab  Active   loratadine (CLARITIN) 10 MG Tab  Active                    ALLERGIES  Allergies   Allergen Reactions    Avelox [Moxifloxacin Hcl In Nacl] Hives    Codeine Vomiting    Morphine Vomiting     Pt informed during admission interview that she gets \"terribly sick\" , violently nausea and vomiting. Present note for updating allergy status.    Cimetidine Rash    Prednisone Anaphylaxis       PHYSICAL EXAM  VITAL SIGNS: /52   Pulse 73   Temp 36.9 °C (98.4 °F) (Temporal)   Resp 17   Ht 1.524 m (5')   Wt 54.4 kg (120 lb)   SpO2 90%   BMI 23.44 kg/m²    Physical Exam  Vitals and nursing note reviewed.   Constitutional:       General: She is not in acute distress.     Appearance: Normal appearance. She is not ill-appearing or toxic-appearing.   HENT:      Head: Normocephalic.      Comments: Tender over nasal bridge with associated swelling and deformity.  There is a superficial abrasion over the nasal bridge but no septal hematoma.     Right Ear: External ear normal.      Left Ear: External ear normal.      Mouth/Throat:      Comments: Missing #9 and 10 teeth without bleeding.  Eyes:      Extraocular " Movements: Extraocular movements intact.      Conjunctiva/sclera: Conjunctivae normal.      Pupils: Pupils are equal, round, and reactive to light.   Cardiovascular:      Rate and Rhythm: Normal rate and regular rhythm.      Pulses: Normal pulses.      Heart sounds: Normal heart sounds. No murmur heard.    No friction rub. No gallop.   Pulmonary:      Effort: Pulmonary effort is normal. No respiratory distress.      Breath sounds: Normal breath sounds. No wheezing, rhonchi or rales.   Abdominal:      General: Abdomen is flat. Bowel sounds are normal. There is no distension.      Palpations: Abdomen is soft.      Tenderness: There is no abdominal tenderness. There is no guarding.   Musculoskeletal:         General: No swelling, deformity or signs of injury. Normal range of motion.      Cervical back: Normal range of motion and neck supple. No rigidity or tenderness.   Skin:     General: Skin is warm and dry.   Neurological:      General: No focal deficit present.      Mental Status: She is alert and oriented to person, place, and time.      Sensory: No sensory deficit.      Motor: No weakness.   Psychiatric:         Mood and Affect: Mood normal.         Behavior: Behavior normal.     DIAGNOSTIC STUDIES / PROCEDURES    LABS  Results for orders placed or performed during the hospital encounter of 12/31/22   CBC WITH DIFFERENTIAL   Result Value Ref Range    WBC 11.4 (H) 4.8 - 10.8 K/uL    RBC 4.08 (L) 4.20 - 5.40 M/uL    Hemoglobin 12.6 12.0 - 16.0 g/dL    Hematocrit 37.2 37.0 - 47.0 %    MCV 91.2 81.4 - 97.8 fL    MCH 30.9 27.0 - 33.0 pg    MCHC 33.9 33.6 - 35.0 g/dL    RDW 45.2 35.9 - 50.0 fL    Platelet Count 341 164 - 446 K/uL    MPV 9.8 9.0 - 12.9 fL    Neutrophils-Polys 65.30 44.00 - 72.00 %    Lymphocytes 25.60 22.00 - 41.00 %    Monocytes 7.50 0.00 - 13.40 %    Eosinophils 0.80 0.00 - 6.90 %    Basophils 0.00 0.00 - 1.80 %    Nucleated RBC 0.20 /100 WBC    Neutrophils (Absolute) 7.44 (H) 2.00 - 7.15 K/uL     Lymphs (Absolute) 2.92 1.00 - 4.80 K/uL    Monos (Absolute) 0.86 (H) 0.00 - 0.85 K/uL    Eos (Absolute) 0.09 0.00 - 0.51 K/uL    Baso (Absolute) 0.00 0.00 - 0.12 K/uL    NRBC (Absolute) 0.02 K/uL    Anisocytosis 1+     Macrocytosis 1+    Basic Metabolic Panel   Result Value Ref Range    Sodium 137 135 - 145 mmol/L    Potassium 3.3 (L) 3.6 - 5.5 mmol/L    Chloride 101 96 - 112 mmol/L    Co2 26 20 - 33 mmol/L    Glucose 96 65 - 99 mg/dL    Bun 6 (L) 8 - 22 mg/dL    Creatinine 0.49 (L) 0.50 - 1.40 mg/dL    Calcium 8.4 (L) 8.5 - 10.5 mg/dL    Anion Gap 10.0 7.0 - 16.0   CoV-2, FLU A/B, and RSV by PCR (2-4 Hours CEPHEID) : Collect NP swab in VTM    Specimen: Respirate   Result Value Ref Range    Influenza virus A RNA Negative Negative    Influenza virus B, PCR Negative Negative    RSV, PCR Negative Negative    SARS-CoV-2 by PCR NotDetected     SARS-CoV-2 Source NP Swab    PROCALCITONIN   Result Value Ref Range    Procalcitonin 3.88 (H) <0.25 ng/mL   TROPONIN   Result Value Ref Range    Troponin T 14 6 - 19 ng/L   ESTIMATED GFR   Result Value Ref Range    GFR (CKD-EPI) 90 >60 mL/min/1.73 m 2   proBrain Natriuretic Peptide, NT   Result Value Ref Range    NT-proBNP 710 (H) 0 - 125 pg/mL   LACTIC ACID   Result Value Ref Range    Lactic Acid 1.3 0.5 - 2.0 mmol/L   PERIPHERAL SMEAR REVIEW   Result Value Ref Range    Peripheral Smear Review see below    PLATELET ESTIMATE   Result Value Ref Range    Plt Estimation Normal    MORPHOLOGY   Result Value Ref Range    RBC Morphology Present    DIFFERENTIAL MANUAL   Result Value Ref Range    Metamyelocytes 0.80 %    Manual Diff Status PERFORMED    EKG   Result Value Ref Range    Report       [facility] Emergency Dept.    Test Date:  2022  Pt Name:    BABS LAU             Department: ER  MRN:        [id number]                      Room:       BL 14 H  Gender:     Female                       Technician: [id number]  :        [id number]                    Requested By:HEMANTH BARBOUR  Order #:    [id number]                    Reading MD:    Measurements  Intervals                                Axis  Rate:       63                           P:          65  IN:         182                          QRS:        -72  QRSD:       106                          T:          61  QT:         440  QTc:        451    Interpretive Statements  Sinus rhythm  Left anterior fascicular block  Low voltage, precordial leads  Compared to ECG 09/26/2022 22:57:05  Low QRS voltage now present  Sinus bradycardia no longer present  T-wave abnormality no longer present       RADIOLOGY  I have independently interpreted the diagnostic imaging associated with this visit and am waiting the final reading from the radiologist.   DX-CHEST-PORTABLE (1 VIEW)   Final Result      1.  Extensive right perihilar and lower lobe airspace disease with bronchial wall thickening. Findings could be due to contusion or multifocal pneumonia.   2.  Mildly enlarged cardiac silhouette with possible changes of the lateral right edema.      CT-CSPINE WITHOUT PLUS RECONS   Final Result      1.  There is no acute fracture of the cervical spine.   2.  There is degenerative disc disease and arthropathy predominantly at the C5-6 and C6-7 levels.   3.  Hazy opacities in the right upper lobe could represent edema or pneumonitis.         CT-MAXILLOFACIAL W/O PLUS RECONS   Final Result      1.  Mildly displaced and angulated right nasal bone fracture.   2.  Right maxillary sinus disease and some frothy secretions in the ethmoid sphenoid sinuses. Correlate for evidence of sinusitis.      CT-HEAD W/O   Final Result         1.  No acute intracranial abnormality.           COURSE & MEDICAL DECISION MAKING  Pertinent Labs & Imaging studies reviewed. (See chart for details)    ED Observation Status? No; Patient does not meet criteria for ED Observation.     INITIAL ASSESSMENT AND PLAN  This is an 89 year old undomiciled female here as a  Code TBI after a fall from her standard height bed at the [facility]. Has an obvious nasal deformity with associated tenderness but no epistaxis or septal hematoma. Missing several front maxillary teeth but notes that this is not new and there is no gum bleeding or evidence of acute trauma. No obvious malocclusion or midface instability. She is alert, interactive, moves all four extremities equally. Does have a wet cough that she notes has been present for weeks. Initially AF with normal VS. Sent to CT scanner to rule out traumatic injury and this did demonstrate a mildly displaced and angulated right nasal bone fracture. I spoke with Dr. Delong, on-call ENT, who recommends outpatient follow up in one week. CT head and c-spine were without traumatic injury but opacities were noted in the right upper lobe. While awaiting radiology read, patient became hypoxic requiring supplemental O2. A medical workup was then undertaken which suggested pneumonia and patient was started on IV antibiotics.    Escalation of care considered, and ultimately not performed: N/A    Barriers to care at this time, including but not limited to: Select: Patient is homeless.     Diagnostic tests and prescription drugs considered including, but not limited to: Select: Antibiotics - see above and below .    FINAL PROBLEM LIST AND DISPOSITION  Patient will be admitted to the hospitalist for hypoxic respiratory failure secondary to CAP. ENT, Dr. Delong, was alerted that the patient will be hospitalized and he will see her tomorrow.    In addition to the chief complaint, the following problems were addressed: Hypoxia likely related to CAP    I have discussed management of the patient with the following physicians and LILLIE's:  Hospitalist, Dr. Anna. ENT, Dr. Delong.    Discussion of management with other [facility] or appropriate source(s): Select: None     FINAL IMPRESSION  Hypoxic respiratory failure  Community acquired pneumonia  Nasal bone  fracture  Fall    Electronically signed by: Juan Awan M.D., 12/31/2022 4:08 PM

## 2023-01-01 NOTE — CARE PLAN
The patient is Stable - Low risk of patient condition declining or worsening    Shift Goals  Clinical Goals: Rest  Patient Goals: Rest    Progress made toward(s) clinical / shift goals:  Patient resting     Patient is not progressing towards the following goals:

## 2023-01-01 NOTE — PROGRESS NOTES
[facility] Medicine Daily Progress Note    Date of Service  1/1/2023    Chief Complaint  Alyssa Funez is a 89 y.o. female admitted 12/31/2022 with   Chief Complaint   Patient presents with    T-5000 GLF     Pt DARCY FLORES from [facility]. Pt was sleeping, fell out of bed, hit face on ground. Unknown LOC. Takes ASA daily. Abrasion & swelling to bridge of nose. Denies head or neck pain. Pt recently d/c'd after hospitalization with pneumonia.          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  In the ED chest x-ray showed right sided consolidation concerning for community-acquired pneumonia, patient was started on IV antibiotics.  ENT was consulted.  Procalcitonin 3.88, negative COVID, influenza, RSV.  QTc 451 ms.    Interval Problem Update  Patient stated her nose hurts.   I spoke with ENT, he will perform a bedside nasal fracture reduction.  On 2LNC, noted to be 86% on room air  PT/OT eval pending.    I have discussed this patient's plan of care and discharge plan at IDT rounds today with Case Management, Nursing, Nursing leadership, and other members of the IDT team.    Consultants/Specialty  ENT    Code Status  DNAR/DNI    Disposition  Patient is not medically cleared for discharge.   Anticipate discharge to  TBD .  I have placed the appropriate orders for post-discharge needs.    Review of Systems  Review of Systems   Constitutional:  Negative for chills, fever and malaise/fatigue.   HENT:  Negative for nosebleeds and sore throat.         Nasal pain from fracture   Respiratory:  Positive for cough. Negative for shortness of breath.    Cardiovascular:  Negative for chest pain and palpitations.   Gastrointestinal:  Negative for abdominal pain, constipation, diarrhea, nausea and vomiting.   Musculoskeletal:  Negative for back pain and joint  pain.   Neurological:  Positive for weakness. Negative for dizziness and headaches.   All other systems reviewed and are negative.     Physical Exam  Temp:  [36.5 °C (97.7 °F)-37 °C (98.6 °F)] 36.5 °C (97.7 °F)  Pulse:  [66-75] 75  Resp:  [17-22] 20  BP: ()/(50-63) 101/50  SpO2:  [86 %-97 %] 97 %    Physical Exam  Vitals and nursing note reviewed.   Constitutional:       General: She is not in acute distress.     Appearance: She is ill-appearing.      Comments: Frail appearing elderly female   HENT:      Head: Normocephalic and atraumatic.      Comments: Temporal muscle wasting positive     Nose:      Comments: Contusion around nose, no discharge, no bleeding     Mouth/Throat:      Mouth: Mucous membranes are dry.      Pharynx: No oropharyngeal exudate.   Eyes:      General: No scleral icterus.     Extraocular Movements: Extraocular movements intact.   Cardiovascular:      Rate and Rhythm: Normal rate and regular rhythm.      Pulses: Normal pulses.      Heart sounds: Normal heart sounds. No murmur heard.  Pulmonary:      Effort: Pulmonary effort is normal. No respiratory distress.      Breath sounds: Normal breath sounds. No wheezing or rhonchi.   Abdominal:      General: Abdomen is flat. Bowel sounds are normal. There is no distension.      Palpations: Abdomen is soft.      Tenderness: There is no abdominal tenderness.   Musculoskeletal:         General: No swelling or tenderness.      Cervical back: Normal range of motion. No rigidity.      Comments: Sarcopenic. Bilateral hand muscle atrophy noted.   Skin:     General: Skin is warm.      Capillary Refill: Capillary refill takes less than 2 seconds.      Coloration: Skin is not jaundiced.      Findings: No erythema.   Neurological:      Mental Status: She is alert and oriented to person, place, and time. Mental status is at baseline.      Motor: Weakness present.   Psychiatric:         Mood and Affect: Mood normal.         Behavior: Behavior normal.        Fluids    Intake/Output Summary (Last 24 hours) at 1/1/2023 1315  Last data filed at 1/1/2023 1200  Gross per 24 hour   Intake 960 ml   Output --   Net 960 ml       Laboratory  Recent Labs     12/31/22  1654 01/01/23  0734   WBC 11.4* 9.1   RBC 4.08* 3.90*   HEMOGLOBIN 12.6 11.7*   HEMATOCRIT 37.2 35.9*   MCV 91.2 92.1   MCH 30.9 30.0   MCHC 33.9 32.6*   RDW 45.2 46.8   PLATELETCT 341 344   MPV 9.8 9.6     Recent Labs     12/31/22  1654 01/01/23  0734   SODIUM 137 135   POTASSIUM 3.3* 3.6   CHLORIDE 101 102   CO2 26 26   GLUCOSE 96 107*   BUN 6* 6*   CREATININE 0.49* 0.50   CALCIUM 8.4* 8.1*                   Imaging  DX-CHEST-PORTABLE (1 VIEW)   Final Result      1.  Extensive right perihilar and lower lobe airspace disease with bronchial wall thickening. Findings could be due to contusion or multifocal pneumonia.   2.  Mildly enlarged cardiac silhouette with possible changes of the lateral right edema.      CT-CSPINE WITHOUT PLUS RECONS   Final Result      1.  There is no acute fracture of the cervical spine.   2.  There is degenerative disc disease and arthropathy predominantly at the C5-6 and C6-7 levels.   3.  Hazy opacities in the right upper lobe could represent edema or pneumonitis.         CT-MAXILLOFACIAL W/O PLUS RECONS   Final Result      1.  Mildly displaced and angulated right nasal bone fracture.   2.  Right maxillary sinus disease and some frothy secretions in the ethmoid sphenoid sinuses. Correlate for evidence of sinusitis.      CT-HEAD W/O   Final Result         1.  No acute intracranial abnormality.              Assessment/Plan  Closed fracture of nasal bone  Assessment & Plan  Mildly displaced and angulated right nasal bone fracture    I spoke with ENT, he will perform a bedside nasal fracture reduction.  PT/OT    Advance care planning  Assessment & Plan  Patient wishes to be DNR/DNI    Hypokalemia  Assessment & Plan  Monitor and replace    Community acquired pneumonia of right  lung  Assessment & Plan  Patient presents with cough, hypoxia  Elevated procal 3.88  WBC 11.4  Blood cultures pending  Rocephin, Azithro  Supportive care    Acute respiratory failure with hypoxia (HCC)  Assessment & Plan  SpO2 86% on room air  Rhonchi on HPI exam, RR 22 upon admission  - continue 2LNC, wean as possible given patient is from [facility]         VTE prophylaxis: Xarelto 10 mg daily as prophylaxis hold for today for nasal fracture reduction    I have performed a physical exam and reviewed and updated ROS and Plan today (1/1/2023). In review of yesterday's note (12/31/2022), there are no changes except as documented above.

## 2023-01-01 NOTE — CONSULTS
"Otolaryngology Consult Note    CC: nasal fracture    HPI: Alyssa Funez is a 89 y.o. female who reportedly fell and hit her nose.  She states that she is breathing well through her nose.  She is unsure if the nose looks crooked.    Past Medical History:   Diagnosis Date    Asthma     Breath shortness     H/O: hysterectomy     MEDICAL HOME     Psychiatric disorder     dementia    S/P lobectomy of lung 1968     Past Surgical History:   Procedure Laterality Date    ERCP IN OR  11/29/2011    Performed by STEPHENIE MONTELONGO at SURGERY [facility] ORS    ORIF, FRACTURE, TIBIA, PLATEAU  12/3/2009    Performed by IMTIAZ COPPOLA at SURGERY [facility] ORS    ORIF, FEMUR  1993    left leg    ORIF, FRACTURE, TIBIA  1993    left after GLF    LA REMOVAL OF LUNG,SEGMENTECTOMY  1960    right middle lobe. traumatic injury    HYSTERECTOMY RADICAL  1960    GYN SURGERY      hysterectomy    OTHER      left  inguinal hernia 30 yrs ago     No current facility-administered medications on file prior to encounter.     No current outpatient medications on file prior to encounter.     Allergies   Allergen Reactions    Prednisone Anaphylaxis    Avelox [Moxifloxacin Hcl In Nacl] Hives    Codeine Vomiting    Morphine Vomiting     Pt informed during admission interview that she gets \"terribly sick\" , violently nausea and vomiting. Present note for updating allergy status.    Cimetidine Rash     Family and Occupational History     Socioeconomic History    Marital status:      Spouse name: Not on file    Number of children: Not on file    Years of education: Not on file    Highest education level: Not on file   Occupational History    Not on file       O:  /50   Pulse 75   Temp 36.5 °C (97.7 °F) (Temporal)   Resp 20   Ht 1.524 m (5')   Wt 57.2 kg (126 lb 1.7 oz)   SpO2 97%   Gen: Confused  Pulm: Breathing comfortably on O2 without stridor or stertor  Eyes: conjunctiva clear, no chemosis. Vision grossly intact " bilaterally  Ears: pinna normal. Hard of hearing  Nose: Small amount of dried blood on the nasal dorsum.  The nose is tender to palpation.  It deviates slightly to the right.    Recent Labs     12/31/22  1654 01/01/23  0734   WBC 11.4* 9.1   RBC 4.08* 3.90*   HEMOGLOBIN 12.6 11.7*   HEMATOCRIT 37.2 35.9*   MCV 91.2 92.1   MCH 30.9 30.0   MCHC 33.9 32.6*   RDW 45.2 46.8   PLATELETCT 341 344   MPV 9.8 9.6     Recent Labs     12/31/22  1654 01/01/23  0734   SODIUM 137 135   POTASSIUM 3.3* 3.6   CHLORIDE 101 102   CO2 26 26   GLUCOSE 96 107*   BUN 6* 6*   CREATININE 0.49* 0.50   CALCIUM 8.4* 8.1*     CT face 12/31:  IMPRESSION:  1.  Mildly displaced and angulated right nasal bone fracture.  2.  Right maxillary sinus disease and some frothy secretions in the ethmoid sphenoid sinuses. Correlate for evidence of sinusitis.      A/P: Alyssa Funez is a 89 y.o. female with a mildly displaced nasal bone fracture.  Patient was unsure if she wanted it reduced or not and I recommended letting it be.  It should heal within a week or 2 as long as she does not injure it again.  She can follow-up with me as needed.    Thank you for the consultation. Please call with questions or concerns.    Varun Delong M.D.  [phone]

## 2023-01-01 NOTE — PROGRESS NOTES
4 Eyes Skin Assessment Completed by KELVIN Tilley and KELVIN Clarke.    Head WDL  Ears Redness, blanching  Nose Scab, brusing, edema from fall/broken nose  Mouth WDL  Neck WDL  Breast/Chest WDL  Shoulder Blades WDL  Spine WDL  (R) Arm/Elbow/Hand WDL  (L) Arm/Elbow/Hand WDL  Abdomen WDL  Groin WDL  Scrotum/Coccyx/Buttocks WDL  (R) Leg WDL  (L) Leg WDL  (R) Heel/Foot/Toe : unable to assess/refused  (L) Heel/Foot/Toe : unable to assess/refused          Devices In Places Nasal Cannula      Interventions In Place Gray Ear Foams    Possible Skin Injury No    Pictures Uploaded Into Epic N/A  Wound Consult Placed N/A  RN Wound Prevention Protocol Ordered No

## 2023-01-01 NOTE — ED NOTES
Med rec updated and complete. Allergies reviewed. Confirmed  name and date of birth.  Pt stated she does not take any medications.  Removed all medications from med rec.      Pt stated she is staying at the [facility] and she can't use the [facility] pharmacy .    Pt will need to use [facility] [phone]

## 2023-01-02 PROBLEM — E83.42 HYPOMAGNESEMIA: Status: ACTIVE | Noted: 2023-01-02

## 2023-01-02 LAB
ALBUMIN SERPL BCP-MCNC: 2.4 G/DL (ref 3.2–4.9)
BACTERIA BLD CULT: NORMAL
BACTERIA BLD CULT: NORMAL
BUN SERPL-MCNC: 4 MG/DL (ref 8–22)
CALCIUM ALBUM COR SERPL-MCNC: 9.7 MG/DL (ref 8.5–10.5)
CALCIUM SERPL-MCNC: 8.4 MG/DL (ref 8.5–10.5)
CHLORIDE SERPL-SCNC: 100 MMOL/L (ref 96–112)
CO2 SERPL-SCNC: 24 MMOL/L (ref 20–33)
CREAT SERPL-MCNC: 0.53 MG/DL (ref 0.5–1.4)
ERYTHROCYTE [DISTWIDTH] IN BLOOD BY AUTOMATED COUNT: 46.8 FL (ref 35.9–50)
GFR SERPLBLD CREATININE-BSD FMLA CKD-EPI: 88 ML/MIN/1.73 M 2
GLUCOSE SERPL-MCNC: 115 MG/DL (ref 65–99)
HCT VFR BLD AUTO: 36 % (ref 37–47)
HGB BLD-MCNC: 11.9 G/DL (ref 12–16)
MAGNESIUM SERPL-MCNC: 1.7 MG/DL (ref 1.5–2.5)
MCH RBC QN AUTO: 31 PG (ref 27–33)
MCHC RBC AUTO-ENTMCNC: 33.1 G/DL (ref 33.6–35)
MCV RBC AUTO: 93.8 FL (ref 81.4–97.8)
PHOSPHATE SERPL-MCNC: 3 MG/DL (ref 2.5–4.5)
PLATELET # BLD AUTO: 370 K/UL (ref 164–446)
PMV BLD AUTO: 9.7 FL (ref 9–12.9)
POTASSIUM SERPL-SCNC: 3.9 MMOL/L (ref 3.6–5.5)
PROCALCITONIN SERPL-MCNC: 1.36 NG/ML
RBC # BLD AUTO: 3.84 M/UL (ref 4.2–5.4)
SIGNIFICANT IND 70042: NORMAL
SIGNIFICANT IND 70042: NORMAL
SITE SITE: NORMAL
SITE SITE: NORMAL
SODIUM SERPL-SCNC: 133 MMOL/L (ref 135–145)
SOURCE SOURCE: NORMAL
SOURCE SOURCE: NORMAL
WBC # BLD AUTO: 9.1 K/UL (ref 4.8–10.8)

## 2023-01-02 PROCEDURE — 36415 COLL VENOUS BLD VENIPUNCTURE: CPT

## 2023-01-02 PROCEDURE — 770006 HCHG ROOM/CARE - MED/SURG/GYN SEMI*

## 2023-01-02 PROCEDURE — 99232 SBSQ HOSP IP/OBS MODERATE 35: CPT | Performed by: INTERNAL MEDICINE

## 2023-01-02 PROCEDURE — 85027 COMPLETE CBC AUTOMATED: CPT

## 2023-01-02 PROCEDURE — 700102 HCHG RX REV CODE 250 W/ 637 OVERRIDE(OP): Performed by: INTERNAL MEDICINE

## 2023-01-02 PROCEDURE — 80069 RENAL FUNCTION PANEL: CPT

## 2023-01-02 PROCEDURE — 84145 PROCALCITONIN (PCT): CPT

## 2023-01-02 PROCEDURE — A9270 NON-COVERED ITEM OR SERVICE: HCPCS | Performed by: INTERNAL MEDICINE

## 2023-01-02 PROCEDURE — 83735 ASSAY OF MAGNESIUM: CPT

## 2023-01-02 PROCEDURE — 92523 SPEECH SOUND LANG COMPREHEN: CPT

## 2023-01-02 RX ORDER — AMOXICILLIN AND CLAVULANATE POTASSIUM 875; 125 MG/1; MG/1
1 TABLET, FILM COATED ORAL EVERY 12 HOURS
Status: COMPLETED | OUTPATIENT
Start: 2023-01-02 | End: 2023-01-08

## 2023-01-02 RX ORDER — OXYCODONE HYDROCHLORIDE 5 MG/1
2.5 TABLET ORAL
Status: DISCONTINUED | OUTPATIENT
Start: 2023-01-02 | End: 2023-01-10

## 2023-01-02 RX ORDER — CELECOXIB 100 MG/1
200 CAPSULE ORAL
Status: DISCONTINUED | OUTPATIENT
Start: 2023-01-07 | End: 2023-01-02

## 2023-01-02 RX ORDER — HYDROMORPHONE HYDROCHLORIDE 1 MG/ML
0.25 INJECTION, SOLUTION INTRAMUSCULAR; INTRAVENOUS; SUBCUTANEOUS
Status: DISCONTINUED | OUTPATIENT
Start: 2023-01-02 | End: 2023-01-07

## 2023-01-02 RX ORDER — UREA 10 %
500 LOTION (ML) TOPICAL
Status: DISCONTINUED | OUTPATIENT
Start: 2023-01-02 | End: 2023-01-17 | Stop reason: HOSPADM

## 2023-01-02 RX ORDER — CELECOXIB 100 MG/1
200 CAPSULE ORAL DAILY
Status: DISCONTINUED | OUTPATIENT
Start: 2023-01-02 | End: 2023-01-02

## 2023-01-02 RX ORDER — AMOXICILLIN AND CLAVULANATE POTASSIUM 500; 125 MG/1; MG/1
1 TABLET, FILM COATED ORAL EVERY 8 HOURS
Status: DISCONTINUED | OUTPATIENT
Start: 2023-01-02 | End: 2023-01-02

## 2023-01-02 RX ORDER — OMEPRAZOLE 20 MG/1
20 CAPSULE, DELAYED RELEASE ORAL DAILY
Status: DISCONTINUED | OUTPATIENT
Start: 2023-01-02 | End: 2023-01-17 | Stop reason: HOSPADM

## 2023-01-02 RX ORDER — OXYCODONE HYDROCHLORIDE 5 MG/1
5 TABLET ORAL
Status: DISCONTINUED | OUTPATIENT
Start: 2023-01-02 | End: 2023-01-07

## 2023-01-02 RX ADMIN — AMOXICILLIN AND CLAVULANATE POTASSIUM 1 TABLET: 875; 125 TABLET, FILM COATED ORAL at 17:31

## 2023-01-02 RX ADMIN — OXYCODONE 5 MG: 5 TABLET ORAL at 11:09

## 2023-01-02 RX ADMIN — AMOXICILLIN AND CLAVULANATE POTASSIUM 1 TABLET: 875; 125 TABLET, FILM COATED ORAL at 11:07

## 2023-01-02 RX ADMIN — OXYCODONE 5 MG: 5 TABLET ORAL at 17:29

## 2023-01-02 RX ADMIN — Medication 500 MG: at 17:30

## 2023-01-02 RX ADMIN — OMEPRAZOLE 20 MG: 20 CAPSULE, DELAYED RELEASE ORAL at 11:07

## 2023-01-02 RX ADMIN — ACETAMINOPHEN 650 MG: 325 TABLET, FILM COATED ORAL at 08:11

## 2023-01-02 RX ADMIN — OXYMETAZOLINE HCL 2 SPRAY: 0.05 SPRAY NASAL at 17:30

## 2023-01-02 RX ADMIN — OXYMETAZOLINE HCL 2 SPRAY: 0.05 SPRAY NASAL at 05:47

## 2023-01-02 RX ADMIN — RIVAROXABAN 10 MG: 10 TABLET, FILM COATED ORAL at 17:30

## 2023-01-02 RX ADMIN — GUAIFENESIN SYRUP AND DEXTROMETHORPHAN 10 ML: 100; 10 SYRUP ORAL at 08:11

## 2023-01-02 RX ADMIN — OXYCODONE 5 MG: 5 TABLET ORAL at 14:19

## 2023-01-02 RX ADMIN — AZITHROMYCIN MONOHYDRATE 500 MG: 250 TABLET ORAL at 05:47

## 2023-01-02 RX ADMIN — ACETAMINOPHEN 650 MG: 325 TABLET, FILM COATED ORAL at 00:53

## 2023-01-02 ASSESSMENT — ENCOUNTER SYMPTOMS
COUGH: 1
ABDOMINAL PAIN: 0
PALPITATIONS: 0
HEADACHES: 0
DIARRHEA: 0
CHILLS: 0
BACK PAIN: 0
SHORTNESS OF BREATH: 0
CONSTIPATION: 0
FEVER: 0
WEAKNESS: 1
VOMITING: 0
SORE THROAT: 0
DIZZINESS: 0
NAUSEA: 0

## 2023-01-02 ASSESSMENT — PAIN DESCRIPTION - PAIN TYPE: TYPE: ACUTE PAIN

## 2023-01-02 NOTE — DISCHARGE PLANNING
Case Management Discharge Planning    Admission Date: 12/31/2022  GMLOS: 4  ALOS: 2    6-Clicks ADL Score: 22  6-Clicks Mobility Score: 20      Anticipated Discharge Dispo:  d/c to [facility] in [place], [place] at phone [phone]    DME Needed: No    Action(s) Taken: Updated Provider/Nurse on Discharge Plan,  Lsw attended AM rounds. MD questioning if pt is appropriate for d/c to [facility].    LSw called the number above, and spoke to . She states the  [ES] are not in today d/t holiday, and will start shift on Tuesday at 8am.. She transferred Lsw to the ES Rhoda.     Lsw left VM asking to discuss pt as a potential d/c to their shelter. Lsw left contact information and LSw's schedule to discuss pt's d/c plans.    Escalations Completed: Provider    Medically Clear: No    Next Steps: Lsw will f/u w/ [facility] to discuss pt's possible d/c to them for living arrangement.     Barriers to Discharge: Medical clearance, Pending Placement, and Homelessness    Is the patient up for discharge tomorrow: No

## 2023-01-02 NOTE — PROGRESS NOTES
"[facility] Medicine Daily Progress Note    Date of Service  1/2/2023    Chief Complaint  Alyssa Funez is a 89 y.o. female admitted 12/31/2022 with   Chief Complaint   Patient presents with    T-5000 GLF     Pt DARCY FLORES from [facility]. Pt was sleeping, fell out of bed, hit face on ground. Unknown LOC. Takes ASA daily. Abrasion & swelling to bridge of nose. Denies head or neck pain. Pt recently d/c'd after hospitalization with pneumonia.          Hospital Course  89-year-old female with a past medical history of psychiatric disorder, lobectomy in 1968, asthma admitted on 12/31/2022 for fall out of bed at the [facility] with hitting her face unclear if LOC.  In the ED chest x-ray showed right sided consolidation concerning for community-acquired pneumonia, patient was started on IV antibiotics.  ENT was consulted.  Procalcitonin 3.88, negative COVID, influenza, RSV.  QTc 451 ms.    Interval Problem Update  Patient stated her nose hurt.  She declined ENT to reduce her nasal bone fracture.  On 2LNC  PT/OT eval pending - patient declining.    Patient likely needs place to stay, no family around to help, patient is not safe alone. She has already been injured.    Asked CM to check [facility] in [place] to see if they can provide more help.  - cog eval by speech ordered    I have discussed this patient's plan of care and discharge plan at IDT rounds today with Case Management, Nursing, Nursing leadership, and other members of the IDT team.    Consultants/Specialty  ENT    Code Status  DNAR/DNI    Disposition  Patient is not medically cleared for discharge.   Anticipate discharge to  TBD .  I have placed the appropriate orders for post-discharge needs.    Review of Systems  Review of Systems   Constitutional:  Negative for chills, fever and malaise/fatigue.   HENT:  Negative for nosebleeds and sore throat.         Nasal pain from fracture   Respiratory:  Positive for cough. Negative for shortness of breath.  "   Cardiovascular:  Negative for chest pain and palpitations.   Gastrointestinal:  Negative for abdominal pain, constipation, diarrhea, nausea and vomiting.   Musculoskeletal:  Negative for back pain and joint pain.   Neurological:  Positive for weakness. Negative for dizziness and headaches.   All other systems reviewed and are negative.     Physical Exam  Temp:  [36.4 °C (97.6 °F)-36.9 °C (98.4 °F)] 36.4 °C (97.6 °F)  Pulse:  [65-90] 65  Resp:  [14-18] 14  BP: (100-116)/(51-69) 105/55  SpO2:  [94 %-99 %] 96 %    Physical Exam  Vitals and nursing note reviewed.   Constitutional:       General: She is not in acute distress.     Appearance: She is not ill-appearing.      Comments: Frail appearing elderly female   HENT:      Head:      Comments: Temporal muscle wasting positive     Nose:      Comments: Contusion around nose, no discharge, no bleeding     Mouth/Throat:      Mouth: Mucous membranes are dry.      Pharynx: No oropharyngeal exudate.   Eyes:      General: No scleral icterus.     Extraocular Movements: Extraocular movements intact.   Cardiovascular:      Rate and Rhythm: Normal rate and regular rhythm.      Pulses: Normal pulses.      Heart sounds: Normal heart sounds. No murmur heard.  Pulmonary:      Effort: Pulmonary effort is normal. No respiratory distress.      Breath sounds: Normal breath sounds. No wheezing or rhonchi.   Abdominal:      General: Abdomen is flat. Bowel sounds are normal. There is no distension.      Palpations: Abdomen is soft.      Tenderness: There is no abdominal tenderness.   Musculoskeletal:         General: No swelling or tenderness.      Cervical back: Normal range of motion. No rigidity.      Comments: Sarcopenic. Bilateral hand muscle atrophy noted.   Skin:     General: Skin is warm.      Capillary Refill: Capillary refill takes less than 2 seconds.      Coloration: Skin is not jaundiced.      Findings: No erythema.   Neurological:      Mental Status: She is alert. Mental  status is at baseline.      Motor: Weakness present.      Comments: Aox2 (self, place)   Psychiatric:         Mood and Affect: Mood normal.         Behavior: Behavior normal.       Fluids    Intake/Output Summary (Last 24 hours) at 1/2/2023 1118  Last data filed at 1/1/2023 1200  Gross per 24 hour   Intake 240 ml   Output --   Net 240 ml       Laboratory  Recent Labs     12/31/22  1654 01/01/23  0734 01/02/23  0348   WBC 11.4* 9.1 9.1   RBC 4.08* 3.90* 3.84*   HEMOGLOBIN 12.6 11.7* 11.9*   HEMATOCRIT 37.2 35.9* 36.0*   MCV 91.2 92.1 93.8   MCH 30.9 30.0 31.0   MCHC 33.9 32.6* 33.1*   RDW 45.2 46.8 46.8   PLATELETCT 341 344 370   MPV 9.8 9.6 9.7     Recent Labs     12/31/22  1654 01/01/23  0734 01/02/23  0348   SODIUM 137 135 133*   POTASSIUM 3.3* 3.6 3.9   CHLORIDE 101 102 100   CO2 26 26 24   GLUCOSE 96 107* 115*   BUN 6* 6* 4*   CREATININE 0.49* 0.50 0.53   CALCIUM 8.4* 8.1* 8.4*                   Imaging  DX-CHEST-PORTABLE (1 VIEW)   Final Result      1.  Extensive right perihilar and lower lobe airspace disease with bronchial wall thickening. Findings could be due to contusion or multifocal pneumonia.   2.  Mildly enlarged cardiac silhouette with possible changes of the lateral right edema.      CT-CSPINE WITHOUT PLUS RECONS   Final Result      1.  There is no acute fracture of the cervical spine.   2.  There is degenerative disc disease and arthropathy predominantly at the C5-6 and C6-7 levels.   3.  Hazy opacities in the right upper lobe could represent edema or pneumonitis.         CT-MAXILLOFACIAL W/O PLUS RECONS   Final Result      1.  Mildly displaced and angulated right nasal bone fracture.   2.  Right maxillary sinus disease and some frothy secretions in the ethmoid sphenoid sinuses. Correlate for evidence of sinusitis.      CT-HEAD W/O   Final Result         1.  No acute intracranial abnormality.              Assessment/Plan  Hypomagnesemia- (present on admission)  Assessment & Plan  Patient does not  have PIV  Replacing via PO  1.7    Closed fracture of nasal bone  Assessment & Plan  Mildly displaced and angulated right nasal bone fracture    I spoke with ENT, tried to perform bedside reduction but patient declined.  PT/OT  Pain regimen ordered    Advance care planning  Assessment & Plan  Patient wishes to be DNR/DNI    Hypokalemia  Assessment & Plan  Monitor and replace  Improved    Community acquired pneumonia of right lung  Assessment & Plan  Patient presents with cough, hypoxia  Elevated procal 3.88, pending repeat.  WBC 11.4, now improved.  Blood cultures pending  Azithro. Patient refused new PIV, unable to use ceftriaxone. Adjusted to Augmentin 500mg Q8H (smaller tablet).  Supportive care  Remains on 2LNC    Acute respiratory failure with hypoxia (HCC)  Assessment & Plan  SpO2 86% on room air  Rhonchi on HPI exam, RR 22 upon admission  - continue 2LNC, wean as possible given patient is from [facility]  - treating pneumonia.    Hyponatremia- (present on admission)  Assessment & Plan  133  Decreasing. Likely due to dehydration. Pt does not have PIV and does not want another to be placed.  Encourage hydration, avoid over use of free water.         VTE prophylaxis: Xarelto 10 mg daily as prophylaxis       I have performed a physical exam and reviewed and updated ROS and Plan today (1/2/2023). In review of yesterday's note (1/1/2023), there are no changes except as documented above.

## 2023-01-02 NOTE — CARE PLAN
The patient is Stable - Low risk of patient condition declining or worsening    Shift Goals  Clinical Goals: Comfort  Patient Goals: Rest    Progress made toward(s) clinical / shift goals:    Problem: Knowledge Deficit - Standard  Goal: Patient and family/care givers will demonstrate understanding of plan of care, disease process/condition, diagnostic tests and medications  Outcome: Progressing     Problem: Pain - Standard  Goal: Alleviation of pain or a reduction in pain to the patient’s comfort goal  Outcome: Progressing       Patient is not progressing towards the following goals:

## 2023-01-02 NOTE — ASSESSMENT & PLAN NOTE
133  Decreasing. Likely due to dehydration. Pt does not have PIV and does not want another to be placed.  Encourage hydration, avoid over use of free water.  RESOLVED

## 2023-01-02 NOTE — PROGRESS NOTES
Assumed care of patient. AO x4. With complaints of pain of 2/10. Routine assessment done. Vital signs within normal limits. Call light within reach and personal belongings within reach. Bed locked and in lowest position. Policies and procedures reinforced patient verbalized understanding.Treaded socks in place. Will continue to monitor.

## 2023-01-02 NOTE — PROGRESS NOTES
Report received and pt care assumed at 645. Assessment completed and pain checked. A&OX4 and up with 1. Pain 10/10, given tylenol. LBM 1/2. Bed alarm on. Call light within reach.

## 2023-01-03 LAB
ALBUMIN SERPL BCP-MCNC: 3 G/DL (ref 3.2–4.9)
BUN SERPL-MCNC: 4 MG/DL (ref 8–22)
CALCIUM ALBUM COR SERPL-MCNC: 9.9 MG/DL (ref 8.5–10.5)
CALCIUM SERPL-MCNC: 9.1 MG/DL (ref 8.5–10.5)
CHLORIDE SERPL-SCNC: 97 MMOL/L (ref 96–112)
CO2 SERPL-SCNC: 28 MMOL/L (ref 20–33)
CREAT SERPL-MCNC: 0.54 MG/DL (ref 0.5–1.4)
GFR SERPLBLD CREATININE-BSD FMLA CKD-EPI: 88 ML/MIN/1.73 M 2
GLUCOSE SERPL-MCNC: 113 MG/DL (ref 65–99)
MAGNESIUM SERPL-MCNC: 1.8 MG/DL (ref 1.5–2.5)
PHOSPHATE SERPL-MCNC: 3.7 MG/DL (ref 2.5–4.5)
POTASSIUM SERPL-SCNC: 4.4 MMOL/L (ref 3.6–5.5)
SODIUM SERPL-SCNC: 133 MMOL/L (ref 135–145)

## 2023-01-03 PROCEDURE — 83735 ASSAY OF MAGNESIUM: CPT

## 2023-01-03 PROCEDURE — 97165 OT EVAL LOW COMPLEX 30 MIN: CPT

## 2023-01-03 PROCEDURE — 700102 HCHG RX REV CODE 250 W/ 637 OVERRIDE(OP): Performed by: INTERNAL MEDICINE

## 2023-01-03 PROCEDURE — 97162 PT EVAL MOD COMPLEX 30 MIN: CPT

## 2023-01-03 PROCEDURE — A9270 NON-COVERED ITEM OR SERVICE: HCPCS | Performed by: INTERNAL MEDICINE

## 2023-01-03 PROCEDURE — 770006 HCHG ROOM/CARE - MED/SURG/GYN SEMI*

## 2023-01-03 PROCEDURE — 36415 COLL VENOUS BLD VENIPUNCTURE: CPT

## 2023-01-03 PROCEDURE — 99231 SBSQ HOSP IP/OBS SF/LOW 25: CPT | Performed by: INTERNAL MEDICINE

## 2023-01-03 PROCEDURE — 80069 RENAL FUNCTION PANEL: CPT

## 2023-01-03 RX ADMIN — OXYMETAZOLINE HCL 2 SPRAY: 0.05 SPRAY NASAL at 06:15

## 2023-01-03 RX ADMIN — OXYCODONE 5 MG: 5 TABLET ORAL at 03:25

## 2023-01-03 RX ADMIN — RIVAROXABAN 10 MG: 10 TABLET, FILM COATED ORAL at 17:14

## 2023-01-03 RX ADMIN — Medication 500 MG: at 08:17

## 2023-01-03 RX ADMIN — AMOXICILLIN AND CLAVULANATE POTASSIUM 1 TABLET: 875; 125 TABLET, FILM COATED ORAL at 06:15

## 2023-01-03 RX ADMIN — OMEPRAZOLE 20 MG: 20 CAPSULE, DELAYED RELEASE ORAL at 06:15

## 2023-01-03 RX ADMIN — Medication 500 MG: at 17:14

## 2023-01-03 RX ADMIN — OXYCODONE 5 MG: 5 TABLET ORAL at 11:47

## 2023-01-03 RX ADMIN — OXYCODONE 5 MG: 5 TABLET ORAL at 07:56

## 2023-01-03 RX ADMIN — OXYMETAZOLINE HCL 2 SPRAY: 0.05 SPRAY NASAL at 17:14

## 2023-01-03 RX ADMIN — ALBUTEROL SULFATE 2 PUFF: 90 AEROSOL, METERED RESPIRATORY (INHALATION) at 18:42

## 2023-01-03 RX ADMIN — AMOXICILLIN AND CLAVULANATE POTASSIUM 1 TABLET: 875; 125 TABLET, FILM COATED ORAL at 17:14

## 2023-01-03 ASSESSMENT — PAIN DESCRIPTION - PAIN TYPE
TYPE: ACUTE PAIN
TYPE: ACUTE PAIN

## 2023-01-03 ASSESSMENT — COGNITIVE AND FUNCTIONAL STATUS - GENERAL
SUGGESTED CMS G CODE MODIFIER MOBILITY: CK
SUGGESTED CMS G CODE MODIFIER DAILY ACTIVITY: CJ
MOBILITY SCORE: 18
MOVING TO AND FROM BED TO CHAIR: A LITTLE
TOILETING: A LITTLE
HELP NEEDED FOR BATHING: A LITTLE
CLIMB 3 TO 5 STEPS WITH RAILING: A LITTLE
TURNING FROM BACK TO SIDE WHILE IN FLAT BAD: A LITTLE
MOVING FROM LYING ON BACK TO SITTING ON SIDE OF FLAT BED: A LITTLE
DAILY ACTIVITIY SCORE: 22
WALKING IN HOSPITAL ROOM: A LITTLE
STANDING UP FROM CHAIR USING ARMS: A LITTLE

## 2023-01-03 ASSESSMENT — ENCOUNTER SYMPTOMS
ABDOMINAL PAIN: 0
SHORTNESS OF BREATH: 0
CONSTIPATION: 0
VOMITING: 0
SORE THROAT: 0
CHILLS: 0
BACK PAIN: 0
FEVER: 0
DIARRHEA: 0
WEAKNESS: 1
HEADACHES: 0
NAUSEA: 0
COUGH: 1
PALPITATIONS: 0
DIZZINESS: 0

## 2023-01-03 ASSESSMENT — GAIT ASSESSMENTS
ASSISTIVE DEVICE: HAND HELD ASSIST
DEVIATION: BRADYKINETIC;SHUFFLED GAIT;INCREASED BASE OF SUPPORT
DISTANCE (FEET): 100
GAIT LEVEL OF ASSIST: CONTACT GUARD ASSIST

## 2023-01-03 ASSESSMENT — ACTIVITIES OF DAILY LIVING (ADL): TOILETING: INDEPENDENT

## 2023-01-03 NOTE — DISCHARGE PLANNING
Case Management Discharge Planning    Admission Date: 12/31/2022  GMLOS: 4  ALOS: 3    6-Clicks ADL Score: 22  6-Clicks Mobility Score: 20      Anticipated Discharge Dispo:  d/c to [facility]    DME Needed: No    Action(s) Taken: Updated Provider/Nurse on Discharge Plan,  Lsw spoke to MD to discuss d/c plan as pt is medically cleared for d/c.  Lsw explained yesterday's rounds discussion. MD will wait for  Lsw to f/u w/ [facility] to see if pt qualifies for admission there.    Lsw called the shelter [[phone]] again and left VM w/  Samantha as Rhoda is not in the office today. Lsw left VM for Samantha explaining as yesterday's VM to Rhoda.    LSw advised Samantha pt is medically cleared for d/c today.      Escalations Completed: Provider    Medically Clear: Yes    Next Steps: Lsw will continue to follow, and assist w/ d/c planning.    Barriers to Discharge: Pending Placement    Is the patient up for discharge tomorrow: No    Addendum:  Lsw attended 2nd rounds. Pt is medically cleared today.    Lsw called [facility]. Lsw requested to speak w/ admissions Samantha.  indicates Samantha is w/ several families now assisting them.    Lsw left message w/ . Lsw provided name, location, and contact information. Lsw requested to discuss possible admission today of 89yo female homeless pt who is at hospital now.    Lsw received update from [facility]. Pt is very close to LIANNA Selby at [facility]. Supervisor Andria ([phone]) indicates she will have someone at [facility] call this Lsw to discuss d/c to them. 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 MS Shah (pt) is known at the [facility] but is not the level of care, independent, to stay there for the long term.

## 2023-01-03 NOTE — PROGRESS NOTES
"[facility] Medicine Daily Progress Note    Date of Service  1/3/2023    Chief Complaint  Alyssa Funez is a 89 y.o. female admitted 12/31/2022 with   Chief Complaint   Patient presents with    T-5000 GLF     Pt DARCY FLORES from [facility]. Pt was sleeping, fell out of bed, hit face on ground. Unknown LOC. Takes ASA daily. Abrasion & swelling to bridge of nose. Denies head or neck pain. Pt recently d/c'd after hospitalization with pneumonia.          Hospital Course  89-year-old female with a past medical history of psychiatric disorder, lobectomy in 1968, asthma admitted on 12/31/2022 for fall out of bed at the [facility] with hitting her face unclear if LOC.  In the ED chest x-ray showed right sided consolidation concerning for community-acquired pneumonia, patient was started on IV antibiotics.  ENT was consulted.  Procalcitonin 3.88, negative COVID, influenza, RSV.  QTc 451 ms.    Interval Problem Update  Patient stated her nose hurt.  She declined ENT to reduce her nasal bone fracture.  Cont on 2L of oxygen wean off as tolerated   PT/OT eval pending - patient declining.  Patient likely needs place to stay, no family around to help, patient is not safe alone. She has already been injured.    Asked CM to check [facility] in [place] to see if they can provide more help.  - cog eval by speech ordered    I have discussed this patient's plan of care and discharge plan at IDT rounds today with Case Management, Nursing, Nursing leadership, and other members of the IDT team.    Consultants/Specialty  ENT    Code Status  DNAR/DNI    Disposition  Patient is not medically cleared for discharge.   Anticipate discharge to  TBD .  I have placed the appropriate orders for post-discharge needs.    Review of Systems  Review of Systems   Constitutional:  Negative for chills, fever and malaise/fatigue.   HENT:  Negative for nosebleeds and sore throat.         Nasal pain from fracture   Respiratory:  Positive for cough. Negative " for shortness of breath.    Cardiovascular:  Negative for chest pain and palpitations.   Gastrointestinal:  Negative for abdominal pain, constipation, diarrhea, nausea and vomiting.   Musculoskeletal:  Negative for back pain and joint pain.   Neurological:  Positive for weakness. Negative for dizziness and headaches.   All other systems reviewed and are negative.     Physical Exam  Temp:  [35.9 °C (96.7 °F)-36.8 °C (98.2 °F)] 36.4 °C (97.6 °F)  Pulse:  [54-71] 71  Resp:  [16-18] 18  BP: (117-152)/(63-75) 127/75  SpO2:  [94 %-96 %] 95 %    Physical Exam  Vitals and nursing note reviewed.   Constitutional:       General: She is not in acute distress.     Appearance: She is not ill-appearing.      Comments: Frail appearing elderly female   HENT:      Head:      Comments: Temporal muscle wasting positive     Nose:      Comments: Contusion around nose, no discharge, no bleeding     Mouth/Throat:      Mouth: Mucous membranes are dry.      Pharynx: No oropharyngeal exudate.   Eyes:      General: No scleral icterus.     Extraocular Movements: Extraocular movements intact.   Cardiovascular:      Rate and Rhythm: Normal rate and regular rhythm.      Pulses: Normal pulses.      Heart sounds: Normal heart sounds. No murmur heard.  Pulmonary:      Effort: Pulmonary effort is normal. No respiratory distress.      Breath sounds: Normal breath sounds. No wheezing or rhonchi.   Abdominal:      General: Abdomen is flat. Bowel sounds are normal. There is no distension.      Palpations: Abdomen is soft.      Tenderness: There is no abdominal tenderness.   Musculoskeletal:         General: No swelling or tenderness.      Cervical back: Normal range of motion. No rigidity.      Comments: Sarcopenic. Bilateral hand muscle atrophy noted.   Skin:     General: Skin is warm.      Capillary Refill: Capillary refill takes less than 2 seconds.      Coloration: Skin is not jaundiced.      Findings: No erythema.   Neurological:      Mental Status:  She is alert. Mental status is at baseline.      Motor: Weakness present.      Comments: Aox2 (self, place)   Psychiatric:         Mood and Affect: Mood normal.         Behavior: Behavior normal.       Fluids    Intake/Output Summary (Last 24 hours) at 1/3/2023 1132  Last data filed at 1/2/2023 1729  Gross per 24 hour   Intake 480 ml   Output --   Net 480 ml       Laboratory  Recent Labs     12/31/22  1654 01/01/23  0734 01/02/23  0348   WBC 11.4* 9.1 9.1   RBC 4.08* 3.90* 3.84*   HEMOGLOBIN 12.6 11.7* 11.9*   HEMATOCRIT 37.2 35.9* 36.0*   MCV 91.2 92.1 93.8   MCH 30.9 30.0 31.0   MCHC 33.9 32.6* 33.1*   RDW 45.2 46.8 46.8   PLATELETCT 341 344 370   MPV 9.8 9.6 9.7     Recent Labs     01/01/23  0734 01/02/23  0348 01/03/23  0320   SODIUM 135 133* 133*   POTASSIUM 3.6 3.9 4.4   CHLORIDE 102 100 97   CO2 26 24 28   GLUCOSE 107* 115* 113*   BUN 6* 4* 4*   CREATININE 0.50 0.53 0.54   CALCIUM 8.1* 8.4* 9.1                   Imaging  DX-CHEST-PORTABLE (1 VIEW)   Final Result      1.  Extensive right perihilar and lower lobe airspace disease with bronchial wall thickening. Findings could be due to contusion or multifocal pneumonia.   2.  Mildly enlarged cardiac silhouette with possible changes of the lateral right edema.      CT-CSPINE WITHOUT PLUS RECONS   Final Result      1.  There is no acute fracture of the cervical spine.   2.  There is degenerative disc disease and arthropathy predominantly at the C5-6 and C6-7 levels.   3.  Hazy opacities in the right upper lobe could represent edema or pneumonitis.         CT-MAXILLOFACIAL W/O PLUS RECONS   Final Result      1.  Mildly displaced and angulated right nasal bone fracture.   2.  Right maxillary sinus disease and some frothy secretions in the ethmoid sphenoid sinuses. Correlate for evidence of sinusitis.      CT-HEAD W/O   Final Result         1.  No acute intracranial abnormality.              Assessment/Plan  Hypomagnesemia- (present on admission)  Assessment &  Plan  Patient does not have PIV  Replacing via PO  1.7    Closed fracture of nasal bone  Assessment & Plan  Mildly displaced and angulated right nasal bone fracture    I spoke with ENT, tried to perform bedside reduction but patient declined.  PT/OT  Pain regimen ordered    Advance care planning  Assessment & Plan  Patient wishes to be DNR/DNI    Hypokalemia  Assessment & Plan  Monitor and replace  Improved    Community acquired pneumonia of right lung  Assessment & Plan  Patient presents with cough, hypoxia  Elevated procal 3.88, pending repeat.  WBC 11.4, now improved.  Blood cultures pending  Azithro. Patient refused new PIV, unable to use ceftriaxone. Adjusted to Augmentin 500mg Q8H (smaller tablet).  Supportive care  Remains on 2LNC    Acute respiratory failure with hypoxia (HCC)  Assessment & Plan  SpO2 86% on room air  Rhonchi on HPI exam, RR 22 upon admission  - continue 2LNC, wean as possible given patient is from [facility]  - treating pneumonia.    Hyponatremia- (present on admission)  Assessment & Plan  133  Decreasing. Likely due to dehydration. Pt does not have PIV and does not want another to be placed.  Encourage hydration, avoid over use of free water.         VTE prophylaxis: Xarelto 10 mg daily as prophylaxis       I have performed a physical exam and reviewed and updated ROS and Plan today (1/3/2023). In review of yesterday's note (1/2/2023), there are no changes except as documented above.

## 2023-01-03 NOTE — CARE PLAN
The patient is Stable - Low risk of patient condition declining or worsening    Shift Goals  Clinical Goals: Comfort  Patient Goals: Rest  Family Goals: n/a    Progress made toward(s) clinical / shift goals:    Problem: Knowledge Deficit - Standard  Goal: Patient and family/care givers will demonstrate understanding of plan of care, disease process/condition, diagnostic tests and medications  Outcome: Progressing     Problem: Pain - Standard  Goal: Alleviation of pain or a reduction in pain to the patient’s comfort goal  Outcome: Progressing       Patient is not progressing towards the following goals:

## 2023-01-03 NOTE — PROGRESS NOTES
Report received and pt care assumed at 645. Assessment completed and pain checked. A&OX4 and up with standby. Pain 7/10 from her head to her toes. LBM 1/2.  Bed alarm on. Call light within reach.

## 2023-01-03 NOTE — PROGRESS NOTES
Assumed care of patient. AO x4. With no complaints of pain . Routine assessment done. Vital signs within normal limits. Call light within reach and personal belongings within reach. Bed locked and in lowest position. Policies and procedures reinforced patient verbalized understanding.Treaded socks in place. Will continue to monitor.

## 2023-01-03 NOTE — THERAPY
Occupational Therapy   Initial Evaluation     Patient Name: Alyssa Funez  Age:  89 y.o., Sex:  female  Medical Record #: [id number]  Today's Date: 1/3/2023     Precautions  Precautions: (P) Fall Risk  Comments: [facility]    Assessment    Patient is 89 y.o. female admitted after falling out of bed, hit face on ground, sustained a nasal bone fracture. Pt normally independent with functional mobility and ADLs living at the [facility]. Pt able to complete all functional mobility and ADLs with supervision, pt very independent and set in ways. Likely at her functional baseline, no acute OT needs identified but likely would benefit from more supportive environment. Patient will not be actively followed for occupational therapy services at this time, however may be seen if requested by physician for 1 more visit within 30 days to address any discharge or equipment needs.     Plan    DC Equipment Recommendations: (P) None  Discharge Recommendations: (P) Anticipate that the patient will have no further occupational therapy needs after discharge from the hospital     Objective       01/03/23 1350   Prior Living Situation   Prior Services None   Housing / Facility Homeless   Equipment Owned Front-Wheel Walker;Wheelchair;Single Point Cane   Lives with - Patient's Self Care Capacity Alone and Able to Care For Self   Prior Level of ADL Function   Self Feeding Independent   Grooming / Hygiene Independent   Bathing Independent   Dressing Independent   Toileting Independent   History of Falls   History of Falls Yes   Date of Last Fall   (reason for admit, fell out of bed)   Precautions   Precautions Fall Risk   Pain 0 - 10 Group   Therapist Pain Assessment Post Activity Pain Same as Prior to Activity;Nurse Notified   Cognition    Cognition / Consciousness X   Speech/ Communication Hard of Hearing   New Learning Impaired   Attention Impaired   Active ROM Upper Body   Active ROM Upper Body  WDL   Strength Upper Body   Upper Body Strength   WDL   Sensation Upper Body   Upper Extremity Sensation  WDL   Balance Assessment   Sitting Balance (Static) Fair   Sitting Balance (Dynamic) Fair   Standing Balance (Static) Poor +   Standing Balance (Dynamic) Poor   Weight Shift Sitting Fair   Weight Shift Standing Fair   Comments w/ HHA   Bed Mobility    Supine to Sit Supervised   Sit to Supine Supervised   Scooting Supervised   ADL Assessment   Grooming Supervision;Standing   Upper Body Dressing Supervision   Lower Body Dressing Supervision   Toileting Supervision   How much help from another person does the patient currently need...   Putting on and taking off regular lower body clothing? 4   Bathing (including washing, rinsing, and drying)? 3   Toileting, which includes using a toilet, bedpan, or urinal? 3   Putting on and taking off regular upper body clothing? 4   Taking care of personal grooming such as brushing teeth? 4   Eating meals? 4   6 Clicks Daily Activity Score 22   Functional Mobility   Sit to Stand Supervised   Bed, Chair, Wheelchair Transfer Supervised   Toilet Transfers Supervised   Transfer Method Stand Step   Mobility bed mobility, hallway mobility, bathroom, back to bed   Comments w/ HHA   Activity Tolerance   Sitting in Chair 5 min  (toilet)   Standing 10 min   Education Group   Education Provided Role of Occupational Therapist   Role of Occupational Therapist Patient Response Patient;Nonacceptance;Explanation;Reinforcement Needed   Problem List   Problem List None   Anticipated Discharge Equipment and Recommendations   DC Equipment Recommendations None   Discharge Recommendations Anticipate that the patient will have no further occupational therapy needs after discharge from the hospital   Interdisciplinary Plan of Care Collaboration   IDT Collaboration with  Nursing;Physical Therapist   Patient Position at End of Therapy In Bed;Bed Alarm On;Call Light within Reach;Tray Table within Reach;Phone within Reach   Collaboration Comments KELVIN  updated

## 2023-01-03 NOTE — CARE PLAN
The patient is Stable - Low risk of patient condition declining or worsening    Shift Goals  Clinical Goals: Comfort  Patient Goals: Rest  Family Goals: n/a    Progress made toward(s) clinical / shift goals:    Problem: Knowledge Deficit - Standard  Goal: Patient and family/care givers will demonstrate understanding of plan of care, disease process/condition, diagnostic tests and medications  1/2/2023 2235 by Hugo Ledesma R.N.  Outcome: Progressing  1/2/2023 2234 by Hugo Ledesma R.N.  Outcome: Progressing     Problem: Pain - Standard  Goal: Alleviation of pain or a reduction in pain to the patient’s comfort goal  1/2/2023 2235 by Hugo Ledesma R.N.  Outcome: Progressing  1/2/2023 2234 by Hugo Ledesma R.N.  Outcome: Progressing       Patient is not progressing towards the following goals:

## 2023-01-04 LAB
ANION GAP SERPL CALC-SCNC: 9 MMOL/L (ref 7–16)
APPEARANCE UR: CLEAR
BACTERIA #/AREA URNS HPF: NEGATIVE /HPF
BILIRUB UR QL STRIP.AUTO: NEGATIVE
BUN SERPL-MCNC: 4 MG/DL (ref 8–22)
CALCIUM SERPL-MCNC: 8.8 MG/DL (ref 8.5–10.5)
CHLORIDE SERPL-SCNC: 95 MMOL/L (ref 96–112)
CO2 SERPL-SCNC: 29 MMOL/L (ref 20–33)
COLOR UR: YELLOW
CREAT SERPL-MCNC: 0.61 MG/DL (ref 0.5–1.4)
EPI CELLS #/AREA URNS HPF: NORMAL /HPF
ERYTHROCYTE [DISTWIDTH] IN BLOOD BY AUTOMATED COUNT: 48.1 FL (ref 35.9–50)
GFR SERPLBLD CREATININE-BSD FMLA CKD-EPI: 85 ML/MIN/1.73 M 2
GLUCOSE SERPL-MCNC: 95 MG/DL (ref 65–99)
GLUCOSE UR STRIP.AUTO-MCNC: NEGATIVE MG/DL
HCT VFR BLD AUTO: 41.3 % (ref 37–47)
HGB BLD-MCNC: 13.3 G/DL (ref 12–16)
HYALINE CASTS #/AREA URNS LPF: NORMAL /LPF
KETONES UR STRIP.AUTO-MCNC: NEGATIVE MG/DL
LEUKOCYTE ESTERASE UR QL STRIP.AUTO: ABNORMAL
MCH RBC QN AUTO: 30.6 PG (ref 27–33)
MCHC RBC AUTO-ENTMCNC: 32.2 G/DL (ref 33.6–35)
MCV RBC AUTO: 95.2 FL (ref 81.4–97.8)
MICRO URNS: ABNORMAL
NITRITE UR QL STRIP.AUTO: NEGATIVE
PH UR STRIP.AUTO: 6.5 [PH] (ref 5–8)
PLATELET # BLD AUTO: 415 K/UL (ref 164–446)
PMV BLD AUTO: 9.1 FL (ref 9–12.9)
POTASSIUM SERPL-SCNC: 4.1 MMOL/L (ref 3.6–5.5)
PROT UR QL STRIP: NEGATIVE MG/DL
RBC # BLD AUTO: 4.34 M/UL (ref 4.2–5.4)
RBC # URNS HPF: NORMAL /HPF
RBC UR QL AUTO: ABNORMAL
SODIUM SERPL-SCNC: 133 MMOL/L (ref 135–145)
SP GR UR STRIP.AUTO: 1.01
UROBILINOGEN UR STRIP.AUTO-MCNC: 0.2 MG/DL
WBC # BLD AUTO: 9.6 K/UL (ref 4.8–10.8)
WBC #/AREA URNS HPF: NORMAL /HPF

## 2023-01-04 PROCEDURE — A9270 NON-COVERED ITEM OR SERVICE: HCPCS | Performed by: INTERNAL MEDICINE

## 2023-01-04 PROCEDURE — 85027 COMPLETE CBC AUTOMATED: CPT

## 2023-01-04 PROCEDURE — 99231 SBSQ HOSP IP/OBS SF/LOW 25: CPT | Performed by: INTERNAL MEDICINE

## 2023-01-04 PROCEDURE — 94760 N-INVAS EAR/PLS OXIMETRY 1: CPT

## 2023-01-04 PROCEDURE — 36415 COLL VENOUS BLD VENIPUNCTURE: CPT

## 2023-01-04 PROCEDURE — 700102 HCHG RX REV CODE 250 W/ 637 OVERRIDE(OP): Performed by: INTERNAL MEDICINE

## 2023-01-04 PROCEDURE — 87086 URINE CULTURE/COLONY COUNT: CPT

## 2023-01-04 PROCEDURE — 770006 HCHG ROOM/CARE - MED/SURG/GYN SEMI*

## 2023-01-04 PROCEDURE — 80048 BASIC METABOLIC PNL TOTAL CA: CPT

## 2023-01-04 PROCEDURE — 81001 URINALYSIS AUTO W/SCOPE: CPT

## 2023-01-04 RX ORDER — SULFAMETHOXAZOLE AND TRIMETHOPRIM 800; 160 MG/1; MG/1
1 TABLET ORAL EVERY 12 HOURS
Status: DISCONTINUED | OUTPATIENT
Start: 2023-01-04 | End: 2023-01-04

## 2023-01-04 RX ADMIN — AMOXICILLIN AND CLAVULANATE POTASSIUM 1 TABLET: 875; 125 TABLET, FILM COATED ORAL at 06:50

## 2023-01-04 RX ADMIN — SULFAMETHOXAZOLE AND TRIMETHOPRIM 1 TABLET: 800; 160 TABLET ORAL at 17:28

## 2023-01-04 RX ADMIN — OMEPRAZOLE 20 MG: 20 CAPSULE, DELAYED RELEASE ORAL at 06:50

## 2023-01-04 RX ADMIN — Medication 500 MG: at 06:50

## 2023-01-04 RX ADMIN — OXYCODONE 5 MG: 5 TABLET ORAL at 00:16

## 2023-01-04 RX ADMIN — OXYMETAZOLINE HCL 2 SPRAY: 0.05 SPRAY NASAL at 06:50

## 2023-01-04 RX ADMIN — OXYCODONE 5 MG: 5 TABLET ORAL at 19:50

## 2023-01-04 RX ADMIN — OXYCODONE 5 MG: 5 TABLET ORAL at 16:18

## 2023-01-04 RX ADMIN — DOCUSATE SODIUM 50 MG AND SENNOSIDES 8.6 MG 2 TABLET: 8.6; 5 TABLET, FILM COATED ORAL at 18:00

## 2023-01-04 RX ADMIN — AMOXICILLIN AND CLAVULANATE POTASSIUM 1 TABLET: 875; 125 TABLET, FILM COATED ORAL at 17:28

## 2023-01-04 RX ADMIN — RIVAROXABAN 10 MG: 10 TABLET, FILM COATED ORAL at 17:28

## 2023-01-04 RX ADMIN — Medication 500 MG: at 17:28

## 2023-01-04 ASSESSMENT — PAIN DESCRIPTION - PAIN TYPE
TYPE: ACUTE PAIN

## 2023-01-04 ASSESSMENT — ENCOUNTER SYMPTOMS
SORE THROAT: 0
PALPITATIONS: 0
HEADACHES: 0
DIZZINESS: 0
NAUSEA: 0
VOMITING: 0
CHILLS: 0
FEVER: 0
CONSTIPATION: 0
ABDOMINAL PAIN: 0
SHORTNESS OF BREATH: 0
BACK PAIN: 0
COUGH: 1
WEAKNESS: 1
DIARRHEA: 0

## 2023-01-04 NOTE — CARE PLAN
The patient is Stable - Low risk of patient condition declining or worsening    Shift Goals  Clinical Goals: Saftety  Patient Goals: Rest  Family Goals: n/a    Progress made toward(s) clinical / shift goals:    Problem: Knowledge Deficit - Standard  Goal: Patient and family/care givers will demonstrate understanding of plan of care, disease process/condition, diagnostic tests and medications  Outcome: Progressing     Problem: Pain - Standard  Goal: Alleviation of pain or a reduction in pain to the patient’s comfort goal  Outcome: Progressing     Problem: Fall Risk  Goal: Patient will remain free from falls  Outcome: Progressing       Patient is not progressing towards the following goals:

## 2023-01-04 NOTE — PROGRESS NOTES
"Lab at bedside. Pt is argumentative and states she \"has been poked 5 times and it did nothing.\" I explained to pt that the labs will tell us if there is anything we need to address. Pt still refused labs.  "

## 2023-01-04 NOTE — DISCHARGE PLANNING
Case Management Discharge Planning    Admission Date: 12/31/2022  GMLOS: 4  ALOS: 4    6-Clicks ADL Score: 22  6-Clicks Mobility Score: 18      Anticipated Discharge Dispo:  d/c back to shelter once medically cleared and qualifies for admission    DME Needed: No    Action(s) Taken: Updated Provider/Nurse on Discharge Plan,  Lsw attended 1st and 2nd rounds.    Lsw received VMs from [facility].    Lsw called back Samantha at [phone]. She will email the admission paperwork for pt to possibly be readmitted to them. Pt is accepted back after 1.1.23. She was there for several of the months of 2022.    Admission qualification includes being independent w/ I/ADLs-self care, etc, and able to walk to the food trailer. They have no medical staff to support anyone.    If pt is admitted, she can work w/ a CM to get into a , and they can establish rapport/discuss  admission and costs/services over time.      Lsw provided email to receive the admission paperwork.    Escalations Completed: Provider    Medically Clear: Yes    Next Steps: Lsw will complete the admission documents once received. Lsw will discuss w/ medical team to see if pt qualifies for admission.    Barriers to Discharge: Pending Placement    Is the patient up for discharge tomorrow: No    Addendum:  Lsw received admission packet for [facility]. Lsw discussed w/ both charge and bedside RN. Bedside RN indicates pt most likely would need medication management if she d/rupesh w/ meds, but is not taking anything needed to prevent readmit to ER.    Lsw provided MD portion of admission documents to charge for MD to complete once he returns to the floor.

## 2023-01-04 NOTE — PROGRESS NOTES
Rec'd report from day shift RN. Assumed pt care. Pt assessment completed and pt is AA&Ox4. No complaints of pain or discomfort at this time. Pt on 2L NC. All needs met. Bed locked, bed in lowest position, call light and personal belongings within reach and treaded socks and bed alarm in place for safety. Hourly rounding in place.

## 2023-01-04 NOTE — RESPIRATORY CARE
COPD EDUCATION by COPD CLINICAL EDUCATOR  1/4/2023 at 10:36 AM by Jonelle Herbert, RRT     Patient reviewed by COPD education team. Patient does not qualify for the COPD program.

## 2023-01-04 NOTE — PROGRESS NOTES
Report received and pt care assumed at 645. Assessment completed and pain checked. A&OX1, self only at this time and up with standby. Pain 0/10. LBM 1/2. Bed alarm on. Call light within reach.

## 2023-01-04 NOTE — PROGRESS NOTES
"[facility] Medicine Daily Progress Note    Date of Service  1/4/2023    Chief Complaint  Alyssa Funez is a 89 y.o. female admitted 12/31/2022 with   Chief Complaint   Patient presents with    T-5000 GLF     Pt DARCY FLORES from [facility]. Pt was sleeping, fell out of bed, hit face on ground. Unknown LOC. Takes ASA daily. Abrasion & swelling to bridge of nose. Denies head or neck pain. Pt recently d/c'd after hospitalization with pneumonia.          Hospital Course  89-year-old female with a past medical history of psychiatric disorder, lobectomy in 1968, asthma admitted on 12/31/2022 for fall out of bed at the [facility] with hitting her face unclear if LOC.  In the ED chest x-ray showed right sided consolidation concerning for community-acquired pneumonia, patient was started on IV antibiotics.  ENT was consulted.  Procalcitonin 3.88, negative COVID, influenza, RSV.  QTc 451 ms.    Interval Problem Update  Patient stated her nose hurt.  She declined ENT to reduce her nasal bone fracture.  Cont on 2L of oxygen wean off as tolerated   PT/OT eval pending - patient declining.  Patient likely needs place to stay, no family around to help, patient is not safe alone. She has already been injured.    CM to check [facility] in [place] to see if they can provide more help.      I have discussed this patient's plan of care and discharge plan at IDT rounds today with Case Management, Nursing, Nursing leadership, and other members of the IDT team.    Consultants/Specialty  ENT    Code Status  DNAR/DNI    Disposition  Patient is not medically cleared for discharge.   Anticipate discharge to  TBD .  I have placed the appropriate orders for post-discharge needs.    Review of Systems  Review of Systems   Constitutional:  Negative for chills, fever and malaise/fatigue.   HENT:  Negative for nosebleeds and sore throat.         Nasal pain from fracture   Respiratory:  Positive for cough. Negative for shortness of breath.  "   Cardiovascular:  Negative for chest pain and palpitations.   Gastrointestinal:  Negative for abdominal pain, constipation, diarrhea, nausea and vomiting.   Musculoskeletal:  Negative for back pain and joint pain.   Neurological:  Positive for weakness. Negative for dizziness and headaches.   All other systems reviewed and are negative.     Physical Exam  Temp:  [37 °C (98.6 °F)-37.5 °C (99.5 °F)] 37.5 °C (99.5 °F)  Pulse:  [68-99] 68  Resp:  [16-18] 16  BP: ()/(54-76) 87/54  SpO2:  [91 %-94 %] 93 %    Physical Exam  Vitals and nursing note reviewed.   Constitutional:       General: She is not in acute distress.     Appearance: She is not ill-appearing.      Comments: Frail appearing elderly female   HENT:      Head:      Comments: Temporal muscle wasting positive     Nose:      Comments: Contusion around nose, no discharge, no bleeding     Mouth/Throat:      Mouth: Mucous membranes are dry.      Pharynx: No oropharyngeal exudate.   Eyes:      General: No scleral icterus.     Extraocular Movements: Extraocular movements intact.   Cardiovascular:      Rate and Rhythm: Normal rate and regular rhythm.      Pulses: Normal pulses.      Heart sounds: Normal heart sounds. No murmur heard.  Pulmonary:      Effort: Pulmonary effort is normal. No respiratory distress.      Breath sounds: Normal breath sounds. No wheezing or rhonchi.   Abdominal:      General: Abdomen is flat. Bowel sounds are normal. There is no distension.      Palpations: Abdomen is soft.      Tenderness: There is no abdominal tenderness.   Musculoskeletal:         General: No swelling or tenderness.      Cervical back: Normal range of motion. No rigidity.      Comments: Sarcopenic. Bilateral hand muscle atrophy noted.   Skin:     General: Skin is warm.      Capillary Refill: Capillary refill takes less than 2 seconds.      Coloration: Skin is not jaundiced.      Findings: No erythema.   Neurological:      Mental Status: She is alert. Mental status  is at baseline.      Motor: Weakness present.      Comments: Aox2 (self, place)   Psychiatric:         Mood and Affect: Mood normal.         Behavior: Behavior normal.       Fluids    Intake/Output Summary (Last 24 hours) at 1/4/2023 1020  Last data filed at 1/4/2023 0830  Gross per 24 hour   Intake 714 ml   Output --   Net 714 ml         Laboratory  Recent Labs     01/02/23  0348 01/04/23  0819   WBC 9.1 9.6   RBC 3.84* 4.34   HEMOGLOBIN 11.9* 13.3   HEMATOCRIT 36.0* 41.3   MCV 93.8 95.2   MCH 31.0 30.6   MCHC 33.1* 32.2*   RDW 46.8 48.1   PLATELETCT 370 415   MPV 9.7 9.1       Recent Labs     01/02/23  0348 01/03/23  0320 01/04/23  0819   SODIUM 133* 133* 133*   POTASSIUM 3.9 4.4 4.1   CHLORIDE 100 97 95*   CO2 24 28 29   GLUCOSE 115* 113* 95   BUN 4* 4* 4*   CREATININE 0.53 0.54 0.61   CALCIUM 8.4* 9.1 8.8                     Imaging  DX-CHEST-PORTABLE (1 VIEW)   Final Result      1.  Extensive right perihilar and lower lobe airspace disease with bronchial wall thickening. Findings could be due to contusion or multifocal pneumonia.   2.  Mildly enlarged cardiac silhouette with possible changes of the lateral right edema.      CT-CSPINE WITHOUT PLUS RECONS   Final Result      1.  There is no acute fracture of the cervical spine.   2.  There is degenerative disc disease and arthropathy predominantly at the C5-6 and C6-7 levels.   3.  Hazy opacities in the right upper lobe could represent edema or pneumonitis.         CT-MAXILLOFACIAL W/O PLUS RECONS   Final Result      1.  Mildly displaced and angulated right nasal bone fracture.   2.  Right maxillary sinus disease and some frothy secretions in the ethmoid sphenoid sinuses. Correlate for evidence of sinusitis.      CT-HEAD W/O   Final Result         1.  No acute intracranial abnormality.                Assessment/Plan  Hypomagnesemia- (present on admission)  Assessment & Plan  Patient does not have PIV  Replacing via PO  1.7    Closed fracture of nasal  bone  Assessment & Plan  Mildly displaced and angulated right nasal bone fracture    I spoke with ENT, tried to perform bedside reduction but patient declined.  PT/OT  Pain regimen ordered    Advance care planning  Assessment & Plan  Patient wishes to be DNR/DNI    Hypokalemia  Assessment & Plan  Monitor and replace  Improved    Community acquired pneumonia of right lung  Assessment & Plan  Patient presents with cough, hypoxia  Elevated procal 3.88, pending repeat.  WBC 11.4, now improved.  Blood cultures pending  Azithro. Patient refused new PIV, unable to use ceftriaxone. Adjusted to Augmentin 500mg Q8H (smaller tablet).  Supportive care  Remains on 2LNC    Acute respiratory failure with hypoxia (HCC)  Assessment & Plan  SpO2 86% on room air  Rhonchi on HPI exam, RR 22 upon admission  - continue 2LNC, wean as possible given patient is from [facility]  - treating pneumonia.    Hyponatremia- (present on admission)  Assessment & Plan  133  Decreasing. Likely due to dehydration. Pt does not have PIV and does not want another to be placed.  Encourage hydration, avoid over use of free water.         VTE prophylaxis: Xarelto 10 mg daily as prophylaxis       I have performed a physical exam and reviewed and updated ROS and Plan today (1/4/2023). In review of yesterday's note (1/3/2023), there are no changes except as documented above.

## 2023-01-04 NOTE — PROGRESS NOTES
"Pt refused lab draw at this time. Pt argumentative and states, \"They keep bugging me. When I am done eating they can come back.\" Lab will come back in an hour.  "

## 2023-01-04 NOTE — CARE PLAN
The patient is Stable - Low risk of patient condition declining or worsening    Shift Goals  Clinical Goals: safety and comfort  Patient Goals: rest  Family Goals: N/A    Progress made toward(s) clinical / shift goals:  Pt medicated per MAR PRN orders for pain. Upon reassessment, pt verbalizes pain at a tolerable level of 7/10. Bed alarm in place for safety.    Problem: Pain - Standard  Goal: Alleviation of pain or a reduction in pain to the patient’s comfort goal  Outcome: Progressing     Problem: Fall Risk  Goal: Patient will remain free from falls  Outcome: Progressing       Patient is not progressing towards the following goals:

## 2023-01-05 LAB
ANION GAP SERPL CALC-SCNC: 10 MMOL/L (ref 7–16)
BACTERIA BLD CULT: NORMAL
BACTERIA BLD CULT: NORMAL
BUN SERPL-MCNC: 4 MG/DL (ref 8–22)
CALCIUM SERPL-MCNC: 8.9 MG/DL (ref 8.5–10.5)
CHLORIDE SERPL-SCNC: 97 MMOL/L (ref 96–112)
CO2 SERPL-SCNC: 28 MMOL/L (ref 20–33)
CREAT SERPL-MCNC: 0.8 MG/DL (ref 0.5–1.4)
GFR SERPLBLD CREATININE-BSD FMLA CKD-EPI: 70 ML/MIN/1.73 M 2
GLUCOSE SERPL-MCNC: 103 MG/DL (ref 65–99)
POTASSIUM SERPL-SCNC: 4 MMOL/L (ref 3.6–5.5)
SIGNIFICANT IND 70042: NORMAL
SIGNIFICANT IND 70042: NORMAL
SITE SITE: NORMAL
SITE SITE: NORMAL
SODIUM SERPL-SCNC: 135 MMOL/L (ref 135–145)
SOURCE SOURCE: NORMAL
SOURCE SOURCE: NORMAL

## 2023-01-05 PROCEDURE — 700102 HCHG RX REV CODE 250 W/ 637 OVERRIDE(OP): Performed by: INTERNAL MEDICINE

## 2023-01-05 PROCEDURE — A9270 NON-COVERED ITEM OR SERVICE: HCPCS | Performed by: INTERNAL MEDICINE

## 2023-01-05 PROCEDURE — 36415 COLL VENOUS BLD VENIPUNCTURE: CPT

## 2023-01-05 PROCEDURE — 99231 SBSQ HOSP IP/OBS SF/LOW 25: CPT | Performed by: STUDENT IN AN ORGANIZED HEALTH CARE EDUCATION/TRAINING PROGRAM

## 2023-01-05 PROCEDURE — 80048 BASIC METABOLIC PNL TOTAL CA: CPT

## 2023-01-05 PROCEDURE — 770006 HCHG ROOM/CARE - MED/SURG/GYN SEMI*

## 2023-01-05 RX ADMIN — OXYCODONE 5 MG: 5 TABLET ORAL at 23:20

## 2023-01-05 RX ADMIN — ACETAMINOPHEN 650 MG: 325 TABLET, FILM COATED ORAL at 19:52

## 2023-01-05 RX ADMIN — Medication 500 MG: at 18:01

## 2023-01-05 RX ADMIN — Medication 500 MG: at 06:24

## 2023-01-05 RX ADMIN — OXYCODONE 5 MG: 5 TABLET ORAL at 08:37

## 2023-01-05 RX ADMIN — AMOXICILLIN AND CLAVULANATE POTASSIUM 1 TABLET: 875; 125 TABLET, FILM COATED ORAL at 05:01

## 2023-01-05 RX ADMIN — AMOXICILLIN AND CLAVULANATE POTASSIUM 1 TABLET: 875; 125 TABLET, FILM COATED ORAL at 17:59

## 2023-01-05 RX ADMIN — OMEPRAZOLE 20 MG: 20 CAPSULE, DELAYED RELEASE ORAL at 05:01

## 2023-01-05 RX ADMIN — OXYCODONE 5 MG: 5 TABLET ORAL at 18:01

## 2023-01-05 RX ADMIN — OXYCODONE 5 MG: 5 TABLET ORAL at 02:26

## 2023-01-05 RX ADMIN — RIVAROXABAN 10 MG: 10 TABLET, FILM COATED ORAL at 18:00

## 2023-01-05 ASSESSMENT — FIBROSIS 4 INDEX: FIB4 SCORE: 1.36

## 2023-01-05 ASSESSMENT — ENCOUNTER SYMPTOMS
WEAKNESS: 1
DIARRHEA: 0
VOMITING: 0
ABDOMINAL PAIN: 0
DIZZINESS: 0
FEVER: 0
COUGH: 1
PALPITATIONS: 0
SORE THROAT: 0
SHORTNESS OF BREATH: 0
CONSTIPATION: 0
CHILLS: 0
HEADACHES: 0
NAUSEA: 0
BACK PAIN: 0

## 2023-01-05 ASSESSMENT — PAIN DESCRIPTION - PAIN TYPE
TYPE: ACUTE PAIN

## 2023-01-05 NOTE — PROGRESS NOTES
"[facility] Medicine Daily Progress Note    Date of Service  1/5/2023    Chief Complaint  Alyssa Funez is a 89 y.o. female admitted 12/31/2022 with   Chief Complaint   Patient presents with    T-5000 GLF     Pt DARCY FLORES from [facility]. Pt was sleeping, fell out of bed, hit face on ground. Unknown LOC. Takes ASA daily. Abrasion & swelling to bridge of nose. Denies head or neck pain. Pt recently d/c'd after hospitalization with pneumonia.          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  In the ED chest x-ray showed right sided consolidation concerning for community-acquired pneumonia, patient was started on IV antibiotics.  ENT was consulted.  Procalcitonin 3.88, negative COVID, influenza, RSV.  QTc 451 ms.    Patient stated her nose hurt.  She declined ENT to reduce her nasal bone fracture.  Cont on 2L of oxygen wean off as tolerated   PT/OT eval pending - patient declining.  Patient likely needs place to stay, no family around to help, patient is not safe alone. She has already been injured.    CM to check [facility] in [place] to see if they can provide more help.    Interval Problem Update  Patient was evaluated at bedside  Patient sitting up to bed, having meal  Stable vitals  On 1 L nasal cannula  PT/OT evaluated patient on 1/3, no needs  Patient unable to care for self not safe to be discharged home    Pending placement    I have discussed this patient's plan of care and discharge plan at IDT rounds today with Case Management, Nursing, Nursing leadership, and other members of the IDT team.    Consultants/Specialty  ENT    Code Status  DNAR/DNI    Disposition  Patient is not medically cleared for discharge.   Anticipate discharge to  TBD .  I have placed the appropriate orders for post-discharge needs.    Review of Systems  Review of " Systems   Constitutional:  Negative for chills, fever and malaise/fatigue.   HENT:  Negative for nosebleeds and sore throat.         Nasal pain from fracture   Respiratory:  Positive for cough. Negative for shortness of breath.    Cardiovascular:  Negative for chest pain and palpitations.   Gastrointestinal:  Negative for abdominal pain, constipation, diarrhea, nausea and vomiting.   Musculoskeletal:  Negative for back pain and joint pain.   Neurological:  Positive for weakness. Negative for dizziness and headaches.   All other systems reviewed and are negative.     Physical Exam  Temp:  [36.8 °C (98.2 °F)-37.3 °C (99.1 °F)] 36.9 °C (98.4 °F)  Pulse:  [61-87] 70  Resp:  [16-17] 16  BP: (100-119)/(62-70) 100/62  SpO2:  [90 %-95 %] 94 %    Physical Exam  Vitals and nursing note reviewed.   Constitutional:       General: She is not in acute distress.     Appearance: She is not ill-appearing.      Comments: Frail appearing elderly female   HENT:      Head:      Comments: Temporal muscle wasting positive     Nose:      Comments: Contusion around nose, no discharge, no bleeding     Mouth/Throat:      Mouth: Mucous membranes are dry.      Pharynx: No oropharyngeal exudate.   Eyes:      General: No scleral icterus.     Extraocular Movements: Extraocular movements intact.   Cardiovascular:      Rate and Rhythm: Normal rate and regular rhythm.      Pulses: Normal pulses.      Heart sounds: Normal heart sounds. No murmur heard.  Pulmonary:      Effort: Pulmonary effort is normal. No respiratory distress.      Breath sounds: Normal breath sounds. No wheezing or rhonchi.   Abdominal:      General: Abdomen is flat. Bowel sounds are normal. There is no distension.      Palpations: Abdomen is soft.      Tenderness: There is no abdominal tenderness.   Musculoskeletal:         General: No swelling or tenderness.      Cervical back: Normal range of motion. No rigidity.      Comments: Sarcopenic. Bilateral hand muscle atrophy noted.    Skin:     General: Skin is warm.      Capillary Refill: Capillary refill takes less than 2 seconds.      Coloration: Skin is not jaundiced.      Findings: No erythema.   Neurological:      Mental Status: She is alert. Mental status is at baseline.      Motor: Weakness present.      Comments: Aox2 (self, place)   Psychiatric:         Mood and Affect: Mood normal.         Behavior: Behavior normal.       Fluids    Intake/Output Summary (Last 24 hours) at 1/5/2023 1545  Last data filed at 1/4/2023 1700  Gross per 24 hour   Intake 240 ml   Output --   Net 240 ml       Laboratory  Recent Labs     01/04/23  0819   WBC 9.6   RBC 4.34   HEMOGLOBIN 13.3   HEMATOCRIT 41.3   MCV 95.2   MCH 30.6   MCHC 32.2*   RDW 48.1   PLATELETCT 415   MPV 9.1     Recent Labs     01/03/23  0320 01/04/23  0819   SODIUM 133* 133*   POTASSIUM 4.4 4.1   CHLORIDE 97 95*   CO2 28 29   GLUCOSE 113* 95   BUN 4* 4*   CREATININE 0.54 0.61   CALCIUM 9.1 8.8                   Imaging  DX-CHEST-PORTABLE (1 VIEW)   Final Result      1.  Extensive right perihilar and lower lobe airspace disease with bronchial wall thickening. Findings could be due to contusion or multifocal pneumonia.   2.  Mildly enlarged cardiac silhouette with possible changes of the lateral right edema.      CT-CSPINE WITHOUT PLUS RECONS   Final Result      1.  There is no acute fracture of the cervical spine.   2.  There is degenerative disc disease and arthropathy predominantly at the C5-6 and C6-7 levels.   3.  Hazy opacities in the right upper lobe could represent edema or pneumonitis.         CT-MAXILLOFACIAL W/O PLUS RECONS   Final Result      1.  Mildly displaced and angulated right nasal bone fracture.   2.  Right maxillary sinus disease and some frothy secretions in the ethmoid sphenoid sinuses. Correlate for evidence of sinusitis.      CT-HEAD W/O   Final Result         1.  No acute intracranial abnormality.                Assessment/Plan  Hypomagnesemia- (present on  admission)  Assessment & Plan  Patient does not have PIV  Replacing via PO  1.7    Closed fracture of nasal bone  Assessment & Plan  Mildly displaced and angulated right nasal bone fracture    I spoke with ENT, tried to perform bedside reduction but patient declined.  PT/OT  Pain regimen ordered    Advance care planning  Assessment & Plan  Patient wishes to be DNR/DNI    Hypokalemia  Assessment & Plan  Monitor and replace  Improved    Community acquired pneumonia of right lung  Assessment & Plan  Patient presents with cough, hypoxia  Elevated procal 3.88, pending repeat.  WBC 11.4, now improved.  Blood cultures pending  Azithro. Patient refused new PIV, unable to use ceftriaxone. Adjusted to Augmentin 500mg Q8H (smaller tablet).  Supportive care  Remains on 2LNC    Acute respiratory failure with hypoxia (HCC)  Assessment & Plan  SpO2 86% on room air  Rhonchi on HPI exam, RR 22 upon admission  - continue 2LNC, wean as possible given patient is from [facility]  - treating pneumonia.    Hyponatremia- (present on admission)  Assessment & Plan  133  Decreasing. Likely due to dehydration. Pt does not have PIV and does not want another to be placed.  Encourage hydration, avoid over use of free water.         VTE prophylaxis: Xarelto 10 mg daily as prophylaxis       I have performed a physical exam and reviewed and updated ROS and Plan today (1/5/2023). In review of yesterday's note (1/4/2023), there are no changes except as documented above.

## 2023-01-05 NOTE — CARE PLAN
The patient is Stable - Low risk of patient condition declining or worsening    Shift Goals  Clinical Goals: Comfort and safety  Patient Goals: Pain management and comfort  Family Goals: N/A    Progress made toward(s) clinical / shift goals:    Problem: Knowledge Deficit - Standard  Goal: Patient and family/care givers will demonstrate understanding of plan of care, disease process/condition, diagnostic tests and medications  Outcome: Progressing     Problem: Pain - Standard  Goal: Alleviation of pain or a reduction in pain to the patient’s comfort goal  Outcome: Progressing     Problem: Fall Risk  Goal: Patient will remain free from falls  Outcome: Progressing       Patient is not progressing towards the following goals:

## 2023-01-05 NOTE — PROGRESS NOTES
HOSPITALIST NOC CROSS COVER    Dc'd bactrim after discussion with pharmacist. Patient had pansensitive ecoli in September, so likely will grow same bacteria. This should be covered sufficiently by Augmentin. She is afebrile, no leukocytosis. Consider nitrofurantoin if additional coverage is warranted.

## 2023-01-05 NOTE — CARE PLAN
The patient is Stable - Low risk of patient condition declining or worsening    Shift Goals  Clinical Goals: safety and comfort  Patient Goals: pain control and rest  Family Goals: N/A    Progress made toward(s) clinical / shift goals:  Pt medicated per MAR PRN orders for pain 8/10. Upon reassessment, pt verbalizes pain 6/10 and able to rest.    Problem: Pain - Standard  Goal: Alleviation of pain or a reduction in pain to the patient’s comfort goal  Outcome: Progressing     Problem: Fall Risk  Goal: Patient will remain free from falls  Outcome: Progressing       Patient is not progressing towards the following goals:

## 2023-01-05 NOTE — PROGRESS NOTES
Rec'd report from day shift RN. Assumed pt care. Pt assessment completed and pt is AA&Ox4. Pt complains of pain 8/10 to nose and will medicated per MAR PRN orders. Pt on 2L NC. All needs met. Bed locked, bed in lowest position, call light and personal belongings within reach and treaded socks and bed alarm in place for safety. Hourly rounding in place.

## 2023-01-06 LAB
BACTERIA UR CULT: NORMAL
SIGNIFICANT IND 70042: NORMAL
SITE SITE: NORMAL
SOURCE SOURCE: NORMAL

## 2023-01-06 PROCEDURE — 770006 HCHG ROOM/CARE - MED/SURG/GYN SEMI*

## 2023-01-06 PROCEDURE — 700102 HCHG RX REV CODE 250 W/ 637 OVERRIDE(OP): Performed by: INTERNAL MEDICINE

## 2023-01-06 PROCEDURE — A9270 NON-COVERED ITEM OR SERVICE: HCPCS | Performed by: INTERNAL MEDICINE

## 2023-01-06 PROCEDURE — 99231 SBSQ HOSP IP/OBS SF/LOW 25: CPT | Performed by: STUDENT IN AN ORGANIZED HEALTH CARE EDUCATION/TRAINING PROGRAM

## 2023-01-06 RX ADMIN — GUAIFENESIN SYRUP AND DEXTROMETHORPHAN 10 ML: 100; 10 SYRUP ORAL at 21:08

## 2023-01-06 RX ADMIN — Medication 500 MG: at 18:01

## 2023-01-06 RX ADMIN — OXYCODONE 5 MG: 5 TABLET ORAL at 05:13

## 2023-01-06 RX ADMIN — OXYCODONE 5 MG: 5 TABLET ORAL at 21:06

## 2023-01-06 RX ADMIN — ACETAMINOPHEN 650 MG: 325 TABLET, FILM COATED ORAL at 14:28

## 2023-01-06 RX ADMIN — OMEPRAZOLE 20 MG: 20 CAPSULE, DELAYED RELEASE ORAL at 05:08

## 2023-01-06 RX ADMIN — Medication 500 MG: at 06:10

## 2023-01-06 RX ADMIN — AMOXICILLIN AND CLAVULANATE POTASSIUM 1 TABLET: 875; 125 TABLET, FILM COATED ORAL at 18:01

## 2023-01-06 RX ADMIN — RIVAROXABAN 10 MG: 10 TABLET, FILM COATED ORAL at 18:01

## 2023-01-06 RX ADMIN — AMOXICILLIN AND CLAVULANATE POTASSIUM 1 TABLET: 875; 125 TABLET, FILM COATED ORAL at 05:08

## 2023-01-06 ASSESSMENT — ENCOUNTER SYMPTOMS
PALPITATIONS: 0
CONSTIPATION: 0
DIZZINESS: 0
DIARRHEA: 0
WEAKNESS: 1
SHORTNESS OF BREATH: 0
CHILLS: 0
COUGH: 1
FEVER: 0
NAUSEA: 0
ABDOMINAL PAIN: 0
VOMITING: 0
HEADACHES: 0
SORE THROAT: 0
BACK PAIN: 0

## 2023-01-06 ASSESSMENT — PAIN DESCRIPTION - PAIN TYPE
TYPE: ACUTE PAIN

## 2023-01-06 NOTE — CARE PLAN
The patient is Stable - Low risk of patient condition declining or worsening    Shift Goals  Clinical Goals: comforty and safety  Patient Goals: rest  Family Goals: N/A    Progress made toward(s) clinical / shift goals:  Bed alarm in place for safety. Pt medicated for pain 7/10 per MAR PRN orders. Upon reassessment, pt verbalizes pain at 5/10 and able to rest.    Problem: Pain - Standard  Goal: Alleviation of pain or a reduction in pain to the patient’s comfort goal  Outcome: Progressing     Problem: Fall Risk  Goal: Patient will remain free from falls  Outcome: Progressing       Patient is not progressing towards the following goals:

## 2023-01-06 NOTE — PROGRESS NOTES
Rec'd report from day shift RN. Assumed pt care. Pt assessment completed and pt is AA&Ox4. Pt complains of pain 3/10 to nose and requesting tylenol. Will medicated per MAR PRN orders. Pt on RA. All needs met. Bed locked, bed in lowest position, call light and personal belongings within reach and treaded socks and bed alarm in place for safety. Hourly rounding in place.

## 2023-01-06 NOTE — DISCHARGE PLANNING
Case Management Discharge Planning    Admission Date: 12/31/2022  GMLOS: 4  ALOS: 6    6-Clicks ADL Score: 22  6-Clicks Mobility Score: 18      Anticipated Discharge Dispo:  d/c back to [facility] shelter once medically stable    DME Needed: no      Action(s) Taken: Updated Provider/Nurse on Discharge Plan,  Lsw spoke to MD about the referral to [facility]. MD indicates pt is not fully independent w/ her IADLs listed on referral to be completed by MD. He will speak to bedside RN to verify but pt may not qualify to d/c to [facility] as not fully independent w/ all I/ADLs and holds RNs hand when walking. RN further indicated pt having difficulty seeing.      Escalations Completed: Provider and Leadership    Medically Clear: Yes    Next Steps: LSw sent email to leadership to update as above and ask for f/u to assist w/ alternative d/c plans. Pt said no to [facility] last admit and is A/O not qualifying for guardianship.     Barriers to Discharge: Pending Placement    Is the patient up for discharge tomorrow: No

## 2023-01-06 NOTE — PROGRESS NOTES
"[facility] Medicine Daily Progress Note    Date of Service  1/6/2023    Chief Complaint  Alyssa Funez is a 89 y.o. female admitted 12/31/2022 with   Chief Complaint   Patient presents with    T-5000 GLF     Pt DACRY FLORES from [facility]. Pt was sleeping, fell out of bed, hit face on ground. Unknown LOC. Takes ASA daily. Abrasion & swelling to bridge of nose. Denies head or neck pain. Pt recently d/c'd after hospitalization with pneumonia.          Hospital Course  89-year-old female with a past medical history of psychiatric disorder, lobectomy in 1968, asthma admitted on 12/31/2022 for fall out of bed at the [facility] with hitting her face unclear if LOC.  In the ED chest x-ray showed right sided consolidation concerning for community-acquired pneumonia, patient was started on IV antibiotics.  ENT was consulted.  Procalcitonin 3.88, negative COVID, influenza, RSV.  QTc 451 ms.    Patient stated her nose hurt.  She declined ENT to reduce her nasal bone fracture.  Cont on 2L of oxygen wean off as tolerated   PT/OT eval pending - patient declining.  Patient likely needs place to stay, no family around to help, patient is not safe alone. She has already been injured.    CM to check [facility] in [place] to see if they can provide more help.    Interval Problem Update  Patient was evaluated at bedside  Patient sitting up to bed, in NAD  Stable vitals  Room air today   PT/OT evaluated patient on 1/3, no needs  Patient unable to care for self not safe to be discharged home    Pending placement    I have discussed this patient's plan of care and discharge plan at IDT rounds today with Case Management, Nursing, Nursing leadership, and other members of the IDT team.    Consultants/Specialty  ENT    Code Status  DNAR/DNI    Disposition  Patient is not medically cleared for discharge.   Anticipate discharge to  TBD .  I have placed the appropriate orders for post-discharge needs.    Review of Systems  Review of Systems "   Constitutional:  Negative for chills, fever and malaise/fatigue.   HENT:  Negative for nosebleeds and sore throat.         Nasal pain from fracture   Respiratory:  Positive for cough. Negative for shortness of breath.    Cardiovascular:  Negative for chest pain and palpitations.   Gastrointestinal:  Negative for abdominal pain, constipation, diarrhea, nausea and vomiting.   Musculoskeletal:  Negative for back pain and joint pain.   Neurological:  Positive for weakness. Negative for dizziness and headaches.   All other systems reviewed and are negative.     Physical Exam  Temp:  [36.4 °C (97.6 °F)-37.2 °C (98.9 °F)] 36.4 °C (97.6 °F)  Pulse:  [65-84] 83  Resp:  [16-20] 20  BP: (100-125)/(46-79) 125/79  SpO2:  [90 %-94 %] 90 %    Physical Exam  Vitals and nursing note reviewed.   Constitutional:       General: She is not in acute distress.     Appearance: She is not ill-appearing.      Comments: Frail appearing elderly female   HENT:      Head:      Comments: Temporal muscle wasting positive     Nose:      Comments: Contusion around nose, no discharge, no bleeding     Mouth/Throat:      Mouth: Mucous membranes are dry.      Pharynx: No oropharyngeal exudate.   Eyes:      General: No scleral icterus.     Extraocular Movements: Extraocular movements intact.   Cardiovascular:      Rate and Rhythm: Normal rate and regular rhythm.      Pulses: Normal pulses.      Heart sounds: Normal heart sounds. No murmur heard.  Pulmonary:      Effort: Pulmonary effort is normal. No respiratory distress.      Breath sounds: Normal breath sounds. No wheezing or rhonchi.   Abdominal:      General: Abdomen is flat. Bowel sounds are normal. There is no distension.      Palpations: Abdomen is soft.      Tenderness: There is no abdominal tenderness.   Musculoskeletal:         General: No swelling or tenderness.      Cervical back: Normal range of motion. No rigidity.      Comments: Sarcopenic. Bilateral hand muscle atrophy noted.   Skin:      General: Skin is warm.      Capillary Refill: Capillary refill takes less than 2 seconds.      Coloration: Skin is not jaundiced.      Findings: No erythema.   Neurological:      Mental Status: She is alert. Mental status is at baseline.      Motor: Weakness present.      Comments: Aox2 (self, place)   Psychiatric:         Mood and Affect: Mood normal.         Behavior: Behavior normal.       Fluids    Intake/Output Summary (Last 24 hours) at 1/6/2023 1223  Last data filed at 1/6/2023 0900  Gross per 24 hour   Intake 1080 ml   Output --   Net 1080 ml       Laboratory  Recent Labs     01/04/23  0819   WBC 9.6   RBC 4.34   HEMOGLOBIN 13.3   HEMATOCRIT 41.3   MCV 95.2   MCH 30.6   MCHC 32.2*   RDW 48.1   PLATELETCT 415   MPV 9.1     Recent Labs     01/04/23 0819 01/05/23  1822   SODIUM 133* 135   POTASSIUM 4.1 4.0   CHLORIDE 95* 97   CO2 29 28   GLUCOSE 95 103*   BUN 4* 4*   CREATININE 0.61 0.80   CALCIUM 8.8 8.9                   Imaging  DX-CHEST-PORTABLE (1 VIEW)   Final Result      1.  Extensive right perihilar and lower lobe airspace disease with bronchial wall thickening. Findings could be due to contusion or multifocal pneumonia.   2.  Mildly enlarged cardiac silhouette with possible changes of the lateral right edema.      CT-CSPINE WITHOUT PLUS RECONS   Final Result      1.  There is no acute fracture of the cervical spine.   2.  There is degenerative disc disease and arthropathy predominantly at the C5-6 and C6-7 levels.   3.  Hazy opacities in the right upper lobe could represent edema or pneumonitis.         CT-MAXILLOFACIAL W/O PLUS RECONS   Final Result      1.  Mildly displaced and angulated right nasal bone fracture.   2.  Right maxillary sinus disease and some frothy secretions in the ethmoid sphenoid sinuses. Correlate for evidence of sinusitis.      CT-HEAD W/O   Final Result         1.  No acute intracranial abnormality.                Assessment/Plan  Hypomagnesemia- (present on  admission)  Assessment & Plan  Patient does not have PIV  Replacing via PO  1.7    Closed fracture of nasal bone  Assessment & Plan  Mildly displaced and angulated right nasal bone fracture    I spoke with ENT, tried to perform bedside reduction but patient declined.  PT/OT  Pain regimen ordered    Advance care planning  Assessment & Plan  Patient wishes to be DNR/DNI    Hypokalemia  Assessment & Plan  Monitor and replace  Improved    Community acquired pneumonia of right lung  Assessment & Plan  Patient presents with cough, hypoxia  Elevated procal 3.88, pending repeat.  WBC 11.4, now improved.  Blood cultures pending  Azithro. Patient refused new PIV, unable to use ceftriaxone. Adjusted to Augmentin 500mg Q8H (smaller tablet).  Supportive care  Remains on 2LNC    Acute respiratory failure with hypoxia (HCC)  Assessment & Plan  SpO2 86% on room air  Rhonchi on HPI exam, RR 22 upon admission  - continue 2LNC, wean as possible given patient is from [facility]  - treating pneumonia.    Hyponatremia- (present on admission)  Assessment & Plan  133  Decreasing. Likely due to dehydration. Pt does not have PIV and does not want another to be placed.  Encourage hydration, avoid over use of free water.         VTE prophylaxis: Xarelto 10 mg daily as prophylaxis       I have performed a physical exam and reviewed and updated ROS and Plan today (1/6/2023). In review of yesterday's note (1/5/2023), there are no changes except as documented above.

## 2023-01-07 PROCEDURE — 700102 HCHG RX REV CODE 250 W/ 637 OVERRIDE(OP): Performed by: INTERNAL MEDICINE

## 2023-01-07 PROCEDURE — A9270 NON-COVERED ITEM OR SERVICE: HCPCS | Performed by: INTERNAL MEDICINE

## 2023-01-07 PROCEDURE — 99231 SBSQ HOSP IP/OBS SF/LOW 25: CPT | Performed by: STUDENT IN AN ORGANIZED HEALTH CARE EDUCATION/TRAINING PROGRAM

## 2023-01-07 PROCEDURE — 700102 HCHG RX REV CODE 250 W/ 637 OVERRIDE(OP): Performed by: STUDENT IN AN ORGANIZED HEALTH CARE EDUCATION/TRAINING PROGRAM

## 2023-01-07 PROCEDURE — A9270 NON-COVERED ITEM OR SERVICE: HCPCS | Performed by: STUDENT IN AN ORGANIZED HEALTH CARE EDUCATION/TRAINING PROGRAM

## 2023-01-07 PROCEDURE — 770006 HCHG ROOM/CARE - MED/SURG/GYN SEMI*

## 2023-01-07 RX ORDER — ACETAMINOPHEN 500 MG
1000 TABLET ORAL 3 TIMES DAILY
Status: DISCONTINUED | OUTPATIENT
Start: 2023-01-07 | End: 2023-01-10

## 2023-01-07 RX ADMIN — RIVAROXABAN 10 MG: 10 TABLET, FILM COATED ORAL at 18:03

## 2023-01-07 RX ADMIN — ACETAMINOPHEN 1000 MG: 500 TABLET ORAL at 20:15

## 2023-01-07 RX ADMIN — OMEPRAZOLE 20 MG: 20 CAPSULE, DELAYED RELEASE ORAL at 06:19

## 2023-01-07 RX ADMIN — DOCUSATE SODIUM 50 MG AND SENNOSIDES 8.6 MG 2 TABLET: 8.6; 5 TABLET, FILM COATED ORAL at 18:03

## 2023-01-07 RX ADMIN — AMOXICILLIN AND CLAVULANATE POTASSIUM 1 TABLET: 875; 125 TABLET, FILM COATED ORAL at 06:19

## 2023-01-07 RX ADMIN — Medication 500 MG: at 06:20

## 2023-01-07 RX ADMIN — Medication 500 MG: at 18:03

## 2023-01-07 RX ADMIN — OXYCODONE 5 MG: 5 TABLET ORAL at 06:20

## 2023-01-07 RX ADMIN — ACETAMINOPHEN 1000 MG: 500 TABLET ORAL at 10:26

## 2023-01-07 RX ADMIN — AMOXICILLIN AND CLAVULANATE POTASSIUM 1 TABLET: 875; 125 TABLET, FILM COATED ORAL at 18:03

## 2023-01-07 ASSESSMENT — ENCOUNTER SYMPTOMS
BACK PAIN: 0
HEADACHES: 0
DIARRHEA: 0
SORE THROAT: 0
FEVER: 0
CONSTIPATION: 0
COUGH: 1
CHILLS: 0
DIZZINESS: 0
PALPITATIONS: 0
VOMITING: 0
ABDOMINAL PAIN: 0
NAUSEA: 0
WEAKNESS: 1
SHORTNESS OF BREATH: 0

## 2023-01-07 ASSESSMENT — PAIN DESCRIPTION - PAIN TYPE
TYPE: ACUTE PAIN

## 2023-01-07 NOTE — CARE PLAN
The patient is Stable - Low risk of patient condition declining or worsening    Shift Goals  Clinical Goals: pain control and find placement to d/c  Patient Goals: comfort and eat  Family Goals: N/A    Progress made toward(s) clinical / shift goals:  Pt medicated per MAR PRN orders for pain 8/10. Upon reassessment, pt verbalizes pain 6/10. Bed alarm in place for safety.    Problem: Knowledge Deficit - Standard  Goal: Patient and family/care givers will demonstrate understanding of plan of care, disease process/condition, diagnostic tests and medications  Outcome: Progressing     Problem: Pain - Standard  Goal: Alleviation of pain or a reduction in pain to the patient’s comfort goal  Outcome: Progressing     Problem: Fall Risk  Goal: Patient will remain free from falls  Outcome: Progressing         Patient is not progressing towards the following goals:

## 2023-01-07 NOTE — PROGRESS NOTES
Rec'd report from day shift RN. Assumed pt care. Pt assessment completed and pt is AA&Ox4. Pt complains of pain 8/10 to nose and cough. Will medicated per MAR PRN orders for pain and cough. Pt on RA and vss. All needs met. Bed locked, bed in lowest position, call light and personal belongings within reach and treaded socks and bed alarm in place for safety. Hourly rounding in place.

## 2023-01-07 NOTE — PROGRESS NOTES
"[facility] Medicine Daily Progress Note    Date of Service  1/7/2023    Chief Complaint  Alyssa Funez is a 89 y.o. female admitted 12/31/2022 with   Chief Complaint   Patient presents with    T-5000 GLF     Pt DARCY FLORES from [facility]. Pt was sleeping, fell out of bed, hit face on ground. Unknown LOC. Takes ASA daily. Abrasion & swelling to bridge of nose. Denies head or neck pain. Pt recently d/c'd after hospitalization with pneumonia.          Hospital Course  89-year-old female with a past medical history of psychiatric disorder, lobectomy in 1968, asthma admitted on 12/31/2022 for fall out of bed at the [facility] with hitting her face unclear if LOC.  In the ED chest x-ray showed right sided consolidation concerning for community-acquired pneumonia, patient was started on IV antibiotics.  ENT was consulted.  Procalcitonin 3.88, negative COVID, influenza, RSV.  QTc 451 ms.    Patient stated her nose hurt.  She declined ENT to reduce her nasal bone fracture.  Cont on 2L of oxygen wean off as tolerated   PT/OT eval pending - patient declining.  Patient likely needs place to stay, no family around to help, patient is not safe alone. She has already been injured.    CM to check [facility] in [place] to see if they can provide more help.    Interval Problem Update  Patient was evaluated at bedside  Patient sitting up to bed, in NAD  Stable vitals  Room air today   PT/OT evaluated patient on 1/3, no needs  Patient unable to care for self not safe to be discharged home    Pending placement    I have discussed this patient's plan of care and discharge plan at IDT rounds today with Case Management, Nursing, Nursing leadership, and other members of the IDT team.    Consultants/Specialty  ENT    Code Status  DNAR/DNI    Disposition  Patient is not medically cleared for discharge.   Anticipate discharge to  TBD .  I have placed the appropriate orders for post-discharge needs.    Review of Systems  Review of Systems "   Constitutional:  Negative for chills, fever and malaise/fatigue.   HENT:  Negative for nosebleeds and sore throat.         Nasal pain from fracture   Respiratory:  Positive for cough. Negative for shortness of breath.    Cardiovascular:  Negative for chest pain and palpitations.   Gastrointestinal:  Negative for abdominal pain, constipation, diarrhea, nausea and vomiting.   Musculoskeletal:  Negative for back pain and joint pain.   Neurological:  Positive for weakness. Negative for dizziness and headaches.   All other systems reviewed and are negative.     Physical Exam  Temp:  [36.3 °C (97.4 °F)-37.2 °C (99 °F)] 36.3 °C (97.4 °F)  Pulse:  [] 100  Resp:  [18-19] 18  BP: (105-126)/(64-80) 105/64  SpO2:  [90 %-92 %] 90 %    Physical Exam  Vitals and nursing note reviewed.   Constitutional:       General: She is not in acute distress.     Appearance: She is not ill-appearing.      Comments: Frail appearing elderly female   HENT:      Head:      Comments: Temporal muscle wasting positive     Nose:      Comments: Contusion around nose, no discharge, no bleeding     Mouth/Throat:      Mouth: Mucous membranes are dry.      Pharynx: No oropharyngeal exudate.   Eyes:      General: No scleral icterus.     Extraocular Movements: Extraocular movements intact.   Cardiovascular:      Rate and Rhythm: Normal rate and regular rhythm.      Pulses: Normal pulses.      Heart sounds: Normal heart sounds. No murmur heard.  Pulmonary:      Effort: Pulmonary effort is normal. No respiratory distress.      Breath sounds: Normal breath sounds. No wheezing or rhonchi.   Abdominal:      General: Abdomen is flat. Bowel sounds are normal. There is no distension.      Palpations: Abdomen is soft.      Tenderness: There is no abdominal tenderness.   Musculoskeletal:         General: No swelling or tenderness.      Cervical back: Normal range of motion. No rigidity.      Comments: Sarcopenic. Bilateral hand muscle atrophy noted.   Skin:      General: Skin is warm.      Capillary Refill: Capillary refill takes less than 2 seconds.      Coloration: Skin is not jaundiced.      Findings: No erythema.   Neurological:      Mental Status: She is alert. Mental status is at baseline.      Motor: Weakness present.      Comments: Aox2 (self, place)   Psychiatric:         Mood and Affect: Mood normal.         Behavior: Behavior normal.       Fluids    Intake/Output Summary (Last 24 hours) at 1/7/2023 1138  Last data filed at 1/6/2023 1700  Gross per 24 hour   Intake 800 ml   Output --   Net 800 ml       Laboratory      Recent Labs     01/05/23  1822   SODIUM 135   POTASSIUM 4.0   CHLORIDE 97   CO2 28   GLUCOSE 103*   BUN 4*   CREATININE 0.80   CALCIUM 8.9                   Imaging  DX-CHEST-PORTABLE (1 VIEW)   Final Result      1.  Extensive right perihilar and lower lobe airspace disease with bronchial wall thickening. Findings could be due to contusion or multifocal pneumonia.   2.  Mildly enlarged cardiac silhouette with possible changes of the lateral right edema.      CT-CSPINE WITHOUT PLUS RECONS   Final Result      1.  There is no acute fracture of the cervical spine.   2.  There is degenerative disc disease and arthropathy predominantly at the C5-6 and C6-7 levels.   3.  Hazy opacities in the right upper lobe could represent edema or pneumonitis.         CT-MAXILLOFACIAL W/O PLUS RECONS   Final Result      1.  Mildly displaced and angulated right nasal bone fracture.   2.  Right maxillary sinus disease and some frothy secretions in the ethmoid sphenoid sinuses. Correlate for evidence of sinusitis.      CT-HEAD W/O   Final Result         1.  No acute intracranial abnormality.                Assessment/Plan  Hypomagnesemia- (present on admission)  Assessment & Plan  Patient does not have PIV  Replacing via PO  1.7    Closed fracture of nasal bone  Assessment & Plan  Mildly displaced and angulated right nasal bone fracture    I spoke with ENT, tried to  perform bedside reduction but patient declined.  PT/OT  Pain regimen ordered    Advance care planning  Assessment & Plan  Patient wishes to be DNR/DNI    Hypokalemia  Assessment & Plan  Monitor and replace  Improved    Community acquired pneumonia of right lung  Assessment & Plan  Patient presents with cough, hypoxia  Elevated procal 3.88, pending repeat.  WBC 11.4, now improved.  Blood cultures pending  Azithro. Patient refused new PIV, unable to use ceftriaxone. Adjusted to Augmentin 500mg Q8H (smaller tablet).  Supportive care  Remains on 2LNC    Acute respiratory failure with hypoxia (HCC)  Assessment & Plan  SpO2 86% on room air  Rhonchi on HPI exam, RR 22 upon admission  - continue 2LNC, wean as possible given patient is from [facility]  - treating pneumonia.    Hyponatremia- (present on admission)  Assessment & Plan  133  Decreasing. Likely due to dehydration. Pt does not have PIV and does not want another to be placed.  Encourage hydration, avoid over use of free water.         VTE prophylaxis: Xarelto 10 mg daily as prophylaxis       I have performed a physical exam and reviewed and updated ROS and Plan today (1/7/2023). In review of yesterday's note (1/6/2023), there are no changes except as documented above.

## 2023-01-08 PROCEDURE — 700102 HCHG RX REV CODE 250 W/ 637 OVERRIDE(OP): Performed by: STUDENT IN AN ORGANIZED HEALTH CARE EDUCATION/TRAINING PROGRAM

## 2023-01-08 PROCEDURE — A9270 NON-COVERED ITEM OR SERVICE: HCPCS | Performed by: INTERNAL MEDICINE

## 2023-01-08 PROCEDURE — 700102 HCHG RX REV CODE 250 W/ 637 OVERRIDE(OP): Performed by: INTERNAL MEDICINE

## 2023-01-08 PROCEDURE — A9270 NON-COVERED ITEM OR SERVICE: HCPCS | Performed by: STUDENT IN AN ORGANIZED HEALTH CARE EDUCATION/TRAINING PROGRAM

## 2023-01-08 PROCEDURE — 99231 SBSQ HOSP IP/OBS SF/LOW 25: CPT | Performed by: STUDENT IN AN ORGANIZED HEALTH CARE EDUCATION/TRAINING PROGRAM

## 2023-01-08 PROCEDURE — 770006 HCHG ROOM/CARE - MED/SURG/GYN SEMI*

## 2023-01-08 RX ADMIN — ACETAMINOPHEN 1000 MG: 500 TABLET ORAL at 14:41

## 2023-01-08 RX ADMIN — OXYCODONE HYDROCHLORIDE 2.5 MG: 5 TABLET ORAL at 18:04

## 2023-01-08 RX ADMIN — DOCUSATE SODIUM 50 MG AND SENNOSIDES 8.6 MG 2 TABLET: 8.6; 5 TABLET, FILM COATED ORAL at 04:46

## 2023-01-08 RX ADMIN — OXYCODONE HYDROCHLORIDE 2.5 MG: 5 TABLET ORAL at 08:33

## 2023-01-08 RX ADMIN — ACETAMINOPHEN 1000 MG: 500 TABLET ORAL at 20:04

## 2023-01-08 RX ADMIN — OXYCODONE HYDROCHLORIDE 2.5 MG: 5 TABLET ORAL at 14:42

## 2023-01-08 RX ADMIN — AMOXICILLIN AND CLAVULANATE POTASSIUM 1 TABLET: 875; 125 TABLET, FILM COATED ORAL at 18:00

## 2023-01-08 RX ADMIN — OXYCODONE HYDROCHLORIDE 2.5 MG: 5 TABLET ORAL at 11:30

## 2023-01-08 RX ADMIN — Medication 500 MG: at 06:04

## 2023-01-08 RX ADMIN — RIVAROXABAN 10 MG: 10 TABLET, FILM COATED ORAL at 18:00

## 2023-01-08 RX ADMIN — Medication 500 MG: at 17:00

## 2023-01-08 RX ADMIN — ACETAMINOPHEN 1000 MG: 500 TABLET ORAL at 08:33

## 2023-01-08 RX ADMIN — OMEPRAZOLE 20 MG: 20 CAPSULE, DELAYED RELEASE ORAL at 04:46

## 2023-01-08 RX ADMIN — AMOXICILLIN AND CLAVULANATE POTASSIUM 1 TABLET: 875; 125 TABLET, FILM COATED ORAL at 04:46

## 2023-01-08 RX ADMIN — DOCUSATE SODIUM 50 MG AND SENNOSIDES 8.6 MG 2 TABLET: 8.6; 5 TABLET, FILM COATED ORAL at 18:00

## 2023-01-08 ASSESSMENT — PAIN SCALES - PAIN ASSESSMENT IN ADVANCED DEMENTIA (PAINAD)
TOTALSCORE: 0
BODYLANGUAGE: RELAXED
CONSOLABILITY: NO NEED TO CONSOLE
FACIALEXPRESSION: SMILING OR INEXPRESSIVE
BREATHING: NORMAL

## 2023-01-08 ASSESSMENT — ENCOUNTER SYMPTOMS
PALPITATIONS: 0
CHILLS: 0
DIZZINESS: 0
HEADACHES: 0
WEAKNESS: 1
BACK PAIN: 0
CONSTIPATION: 0
FEVER: 0
DIARRHEA: 0
ABDOMINAL PAIN: 0
COUGH: 1
VOMITING: 0
SORE THROAT: 0
NAUSEA: 0
SHORTNESS OF BREATH: 0

## 2023-01-08 ASSESSMENT — PAIN DESCRIPTION - PAIN TYPE
TYPE: ACUTE PAIN;CHRONIC PAIN
TYPE: ACUTE PAIN;CHRONIC PAIN
TYPE: CHRONIC PAIN;ACUTE PAIN
TYPE: ACUTE PAIN
TYPE: CHRONIC PAIN;ACUTE PAIN
TYPE: ACUTE PAIN;CHRONIC PAIN
TYPE: ACUTE PAIN
TYPE: ACUTE PAIN;CHRONIC PAIN

## 2023-01-08 ASSESSMENT — FIBROSIS 4 INDEX: FIB4 SCORE: 1.36

## 2023-01-08 ASSESSMENT — PAIN SCALES - WONG BAKER: WONGBAKER_NUMERICALRESPONSE: HURTS JUST A LITTLE BIT

## 2023-01-08 NOTE — CARE PLAN
Problem: Pain - Standard  Goal: Alleviation of pain or a reduction in pain to the patient’s comfort goal  Outcome: Progressing     Problem: Fall Risk  Goal: Patient will remain free from falls  Outcome: Progressing   The patient is Stable - Low risk of patient condition declining or worsening    Shift Goals  Clinical Goals: Placement  Patient Goals: Comfort and rest  Family Goals: NA    Progress made toward(s) clinical / shift goals:  patient's pain is under control

## 2023-01-08 NOTE — PROGRESS NOTES
"[facility] Medicine Daily Progress Note    Date of Service  1/8/2023    Chief Complaint  Alyssa Funez is a 89 y.o. female admitted 12/31/2022 with   Chief Complaint   Patient presents with    T-5000 GLF     Pt DARCY FLORES from [facility]. Pt was sleeping, fell out of bed, hit face on ground. Unknown LOC. Takes ASA daily. Abrasion & swelling to bridge of nose. Denies head or neck pain. Pt recently d/c'd after hospitalization with pneumonia.          Hospital Course  89-year-old female with a past medical history of psychiatric disorder, lobectomy in 1968, asthma admitted on 12/31/2022 for fall out of bed at the [facility] with hitting her face unclear if LOC.  In the ED chest x-ray showed right sided consolidation concerning for community-acquired pneumonia, patient was started on IV antibiotics.  ENT was consulted.  Procalcitonin 3.88, negative COVID, influenza, RSV.  QTc 451 ms.    Patient stated her nose hurt.  She declined ENT to reduce her nasal bone fracture.  Cont on 2L of oxygen wean off as tolerated   PT/OT eval pending - patient declining.  Patient likely needs place to stay, no family around to help, patient is not safe alone. She has already been injured.    CM to check [facility] in [place] to see if they can provide more help.    Interval Problem Update  Patient was evaluated at bedside  Patient sitting up to bed, in NAD  Stable vitals  Room air today   PT/OT evaluated patient on 1/3, no needs  Patient unable to care for self not safe to be discharged home    Pending placement    I have discussed this patient's plan of care and discharge plan at IDT rounds today with Case Management, Nursing, Nursing leadership, and other members of the IDT team.    Consultants/Specialty  ENT    Code Status  DNAR/DNI    Disposition  Patient is not medically cleared for discharge.   Anticipate discharge to  TBD .  I have placed the appropriate orders for post-discharge needs.    Review of Systems  Review of Systems "   Constitutional:  Negative for chills, fever and malaise/fatigue.   HENT:  Negative for nosebleeds and sore throat.         Nasal pain from fracture   Respiratory:  Positive for cough. Negative for shortness of breath.    Cardiovascular:  Negative for chest pain and palpitations.   Gastrointestinal:  Negative for abdominal pain, constipation, diarrhea, nausea and vomiting.   Musculoskeletal:  Negative for back pain and joint pain.   Neurological:  Positive for weakness. Negative for dizziness and headaches.   All other systems reviewed and are negative.     Physical Exam  Temp:  [37.1 °C (98.8 °F)-37.4 °C (99.4 °F)] 37.1 °C (98.8 °F)  Pulse:  [67-75] 67  Resp:  [18-19] 19  BP: ()/(52-62) 101/52  SpO2:  [90 %-91 %] 90 %    Physical Exam  Vitals and nursing note reviewed.   Constitutional:       General: She is not in acute distress.     Appearance: She is not ill-appearing.      Comments: Frail appearing elderly female   HENT:      Head:      Comments: Temporal muscle wasting positive     Nose:      Comments: Contusion around nose, no discharge, no bleeding     Mouth/Throat:      Mouth: Mucous membranes are dry.      Pharynx: No oropharyngeal exudate.   Eyes:      General: No scleral icterus.     Extraocular Movements: Extraocular movements intact.   Cardiovascular:      Rate and Rhythm: Normal rate and regular rhythm.      Pulses: Normal pulses.      Heart sounds: Normal heart sounds. No murmur heard.  Pulmonary:      Effort: Pulmonary effort is normal. No respiratory distress.      Breath sounds: Normal breath sounds. No wheezing or rhonchi.   Abdominal:      General: Abdomen is flat. Bowel sounds are normal. There is no distension.      Palpations: Abdomen is soft.      Tenderness: There is no abdominal tenderness.   Musculoskeletal:         General: No swelling or tenderness.      Cervical back: Normal range of motion. No rigidity.      Comments: Sarcopenic. Bilateral hand muscle atrophy noted.   Skin:      General: Skin is warm.      Capillary Refill: Capillary refill takes less than 2 seconds.      Coloration: Skin is not jaundiced.      Findings: No erythema.   Neurological:      Mental Status: She is alert. Mental status is at baseline.      Motor: Weakness present.      Comments: Aox2 (self, place)   Psychiatric:         Mood and Affect: Mood normal.         Behavior: Behavior normal.       Fluids    Intake/Output Summary (Last 24 hours) at 1/8/2023 0806  Last data filed at 1/7/2023 2200  Gross per 24 hour   Intake 417 ml   Output --   Net 417 ml         Laboratory      Recent Labs     01/05/23  1822   SODIUM 135   POTASSIUM 4.0   CHLORIDE 97   CO2 28   GLUCOSE 103*   BUN 4*   CREATININE 0.80   CALCIUM 8.9                     Imaging  DX-CHEST-PORTABLE (1 VIEW)   Final Result      1.  Extensive right perihilar and lower lobe airspace disease with bronchial wall thickening. Findings could be due to contusion or multifocal pneumonia.   2.  Mildly enlarged cardiac silhouette with possible changes of the lateral right edema.      CT-CSPINE WITHOUT PLUS RECONS   Final Result      1.  There is no acute fracture of the cervical spine.   2.  There is degenerative disc disease and arthropathy predominantly at the C5-6 and C6-7 levels.   3.  Hazy opacities in the right upper lobe could represent edema or pneumonitis.         CT-MAXILLOFACIAL W/O PLUS RECONS   Final Result      1.  Mildly displaced and angulated right nasal bone fracture.   2.  Right maxillary sinus disease and some frothy secretions in the ethmoid sphenoid sinuses. Correlate for evidence of sinusitis.      CT-HEAD W/O   Final Result         1.  No acute intracranial abnormality.                Assessment/Plan  Hypomagnesemia- (present on admission)  Assessment & Plan  Patient does not have PIV  Replacing via PO  1.7    Closed fracture of nasal bone  Assessment & Plan  Mildly displaced and angulated right nasal bone fracture    I spoke with ENT, tried to  perform bedside reduction but patient declined.  PT/OT  Pain regimen ordered    Advance care planning  Assessment & Plan  Patient wishes to be DNR/DNI    Hypokalemia  Assessment & Plan  Monitor and replace  Improved    Community acquired pneumonia of right lung  Assessment & Plan  Patient presents with cough, hypoxia  Elevated procal 3.88, pending repeat.  WBC 11.4, now improved.  Blood cultures pending  Azithro. Patient refused new PIV, unable to use ceftriaxone. Adjusted to Augmentin 500mg Q8H (smaller tablet).  Supportive care  Remains on 2LNC    Acute respiratory failure with hypoxia (HCC)  Assessment & Plan  SpO2 86% on room air  Rhonchi on HPI exam, RR 22 upon admission  - continue 2LNC, wean as possible given patient is from [facility]  - treating pneumonia.    Hyponatremia- (present on admission)  Assessment & Plan  133  Decreasing. Likely due to dehydration. Pt does not have PIV and does not want another to be placed.  Encourage hydration, avoid over use of free water.         VTE prophylaxis: Xarelto 10 mg daily as prophylaxis       I have performed a physical exam and reviewed and updated ROS and Plan today (1/8/2023). In review of yesterday's note (1/7/2023), there are no changes except as documented above.

## 2023-01-08 NOTE — CARE PLAN
Problem: Pain - Standard  Goal: Alleviation of pain or a reduction in pain to the patient’s comfort goal  Outcome: Progressing     Problem: Fall Risk  Goal: Patient will remain free from falls  Outcome: Progressing       The patient is Stable - Low risk of patient condition declining or worsening    Shift Goals  Clinical Goals: Monitor VS, lab, comfort and placement  Patient Goals: Comfort and rest  Family Goals: N/A    Progress made toward(s) clinical / shift goals:  Fall precautions in place, bed alarm on, call light in reach. Pain medication given as ordered for pain of nasal area.    Patient is not progressing towards the following goals:

## 2023-01-08 NOTE — PROGRESS NOTES
Assumed patient care at 1900 hr. A&Ox4. Denied having any pain. Up to the commode chair to voids. All needs met. Bed locked and in lowest position. Call light ad personal belonging within reach. Bed alarm in place for safety.  Monitor hourly rounding in place.

## 2023-01-08 NOTE — PROGRESS NOTES
Assumed care of patient 0700. Received Report from [facility] nurse. Patient A&O 4, on RA, Reporting a pain level of 3, in nasal area. Call light within reach, belongings within reach, Fall precautions in place, and bed alarm is on and bed in lowest position. Patient does not have any other needs at this time. Care plan for the day discussed with patient and reminded to call for assistance with getting up out of bed.

## 2023-01-09 PROCEDURE — 99231 SBSQ HOSP IP/OBS SF/LOW 25: CPT | Performed by: STUDENT IN AN ORGANIZED HEALTH CARE EDUCATION/TRAINING PROGRAM

## 2023-01-09 PROCEDURE — 770006 HCHG ROOM/CARE - MED/SURG/GYN SEMI*

## 2023-01-09 PROCEDURE — 700102 HCHG RX REV CODE 250 W/ 637 OVERRIDE(OP): Performed by: STUDENT IN AN ORGANIZED HEALTH CARE EDUCATION/TRAINING PROGRAM

## 2023-01-09 PROCEDURE — 700102 HCHG RX REV CODE 250 W/ 637 OVERRIDE(OP): Performed by: INTERNAL MEDICINE

## 2023-01-09 PROCEDURE — A9270 NON-COVERED ITEM OR SERVICE: HCPCS | Performed by: INTERNAL MEDICINE

## 2023-01-09 PROCEDURE — A9270 NON-COVERED ITEM OR SERVICE: HCPCS | Performed by: STUDENT IN AN ORGANIZED HEALTH CARE EDUCATION/TRAINING PROGRAM

## 2023-01-09 RX ADMIN — DOCUSATE SODIUM 50 MG AND SENNOSIDES 8.6 MG 2 TABLET: 8.6; 5 TABLET, FILM COATED ORAL at 04:23

## 2023-01-09 RX ADMIN — ACETAMINOPHEN 1000 MG: 500 TABLET ORAL at 08:01

## 2023-01-09 RX ADMIN — ACETAMINOPHEN 1000 MG: 500 TABLET ORAL at 21:02

## 2023-01-09 RX ADMIN — OXYCODONE HYDROCHLORIDE 2.5 MG: 5 TABLET ORAL at 18:47

## 2023-01-09 RX ADMIN — OXYCODONE HYDROCHLORIDE 2.5 MG: 5 TABLET ORAL at 14:29

## 2023-01-09 RX ADMIN — OXYCODONE HYDROCHLORIDE 2.5 MG: 5 TABLET ORAL at 08:02

## 2023-01-09 RX ADMIN — Medication 500 MG: at 05:33

## 2023-01-09 RX ADMIN — DOCUSATE SODIUM 50 MG AND SENNOSIDES 8.6 MG 2 TABLET: 8.6; 5 TABLET, FILM COATED ORAL at 18:00

## 2023-01-09 RX ADMIN — Medication 500 MG: at 17:00

## 2023-01-09 RX ADMIN — ACETAMINOPHEN 1000 MG: 500 TABLET ORAL at 14:29

## 2023-01-09 RX ADMIN — RIVAROXABAN 10 MG: 10 TABLET, FILM COATED ORAL at 18:00

## 2023-01-09 RX ADMIN — OMEPRAZOLE 20 MG: 20 CAPSULE, DELAYED RELEASE ORAL at 04:23

## 2023-01-09 ASSESSMENT — PAIN SCALES - PAIN ASSESSMENT IN ADVANCED DEMENTIA (PAINAD)
FACIALEXPRESSION: SAD, FRIGHTENED, FROWN
BODYLANGUAGE: TENSE, DISTRESSED PACING, FIDGETING
TOTALSCORE: 3
CONSOLABILITY: DISTRACTED OR REASSURED BY VOICE/TOUCH
BREATHING: NORMAL

## 2023-01-09 ASSESSMENT — ENCOUNTER SYMPTOMS
FEVER: 0
BACK PAIN: 0
SHORTNESS OF BREATH: 0
WEAKNESS: 1
VOMITING: 0
COUGH: 1
SORE THROAT: 0
DIZZINESS: 0
CONSTIPATION: 0
CHILLS: 0
ABDOMINAL PAIN: 0
DIARRHEA: 0
NAUSEA: 0
HEADACHES: 0
PALPITATIONS: 0

## 2023-01-09 ASSESSMENT — PAIN SCALES - WONG BAKER: WONGBAKER_NUMERICALRESPONSE: HURTS A LITTLE MORE

## 2023-01-09 ASSESSMENT — PAIN DESCRIPTION - PAIN TYPE
TYPE: ACUTE PAIN;CHRONIC PAIN
TYPE: ACUTE PAIN
TYPE: ACUTE PAIN;CHRONIC PAIN
TYPE: ACUTE PAIN

## 2023-01-09 NOTE — PROGRESS NOTES
"Patient was displaying a shouting behavior to her roommate for anything and everything. First fighting for the side of the window, then because she wanted the TV to be off, or the light to come off, including saying to staff: \"Be quiet\" when staff were attending calls from roommate.\"I\"m the one paying for, not you\"  "

## 2023-01-09 NOTE — CARE PLAN
Problem: Knowledge Deficit - Standard  Goal: Patient and family/care givers will demonstrate understanding of plan of care, disease process/condition, diagnostic tests and medications  Outcome: Progressing     Problem: Fall Risk  Goal: Patient will remain free from falls  Outcome: Progressing       The patient is Stable - Low risk of patient condition declining or worsening    Shift Goals  Clinical Goals: Monitor labs, VS, comfort , safety and placement  Patient Goals: Rest and sleep  Family Goals: N/A    Progress made toward(s) clinical / shift goals:  Reoriented as needed throughout the day. Fall precautions in place, bed alarm on, call light in reach.    Patient is not progressing towards the following goals:

## 2023-01-09 NOTE — CARE PLAN
Problem: Knowledge Deficit - Standard  Goal: Patient and family/care givers will demonstrate understanding of plan of care, disease process/condition, diagnostic tests and medications  Outcome: Progressing     Problem: Pain - Standard  Goal: Alleviation of pain or a reduction in pain to the patient’s comfort goal  1/8/2023 2356 by Otilio Heart R.N.  Outcome: Progressing  1/8/2023 2308 by Otilio Heart R.N.  Outcome: Progressing     Problem: Fall Risk  Goal: Patient will remain free from falls  1/8/2023 2356 by Otilio Heart R.N.  Outcome: Progressing  1/8/2023 2308 by Otilio Heart R.N.  Outcome: Progressing   The patient is Stable - Low risk of patient condition declining or worsening    Shift Goals  Clinical Goals: Monitor VS and lab  Patient Goals: comfort, rest  Family Goals: NA    Progress made toward(s) clinical / shift goals:  Fall precautions in place, bed alarm on, call light in reach. Pain medication given as ordered for pain of nasal area.         :

## 2023-01-09 NOTE — PROGRESS NOTES
Assumed care of patient 0700. Received Report from [facility] nurse. Patient A&O 4, needs reoriented at times, on RA, Reporting a pain level of 0. Call light within reach, belongings within reach, Fall precautions in place, and bed alarm is on and bed in lowest position. Patient does not have any other needs at this time. Discussed plan of care for the day with patient and patient verbalizes understanding.

## 2023-01-09 NOTE — PROGRESS NOTES
"[facility] Medicine Daily Progress Note    Date of Service  1/9/2023    Chief Complaint  Alyssa Funez is a 89 y.o. female admitted 12/31/2022 with   Chief Complaint   Patient presents with    T-5000 GLF     Pt DARCY FLORES from [facility]. Pt was sleeping, fell out of bed, hit face on ground. Unknown LOC. Takes ASA daily. Abrasion & swelling to bridge of nose. Denies head or neck pain. Pt recently d/c'd after hospitalization with pneumonia.          Hospital Course  89-year-old female with a past medical history of psychiatric disorder, lobectomy in 1968, asthma admitted on 12/31/2022 for fall out of bed at the [facility] with hitting her face unclear if LOC.  In the ED chest x-ray showed right sided consolidation concerning for community-acquired pneumonia, patient was started on IV antibiotics.  ENT was consulted.  Procalcitonin 3.88, negative COVID, influenza, RSV.  QTc 451 ms.    Patient stated her nose hurt.  She declined ENT to reduce her nasal bone fracture.  Cont on 2L of oxygen wean off as tolerated   PT/OT eval pending - patient declining.  Patient likely needs place to stay, no family around to help, patient is not safe alone. She has already been injured.    CM to check [facility] [place] to see if they can provide more help.    Interval Problem Update  Patient was evaluated at bedside  Patient sitting up to bed, in NAD  Questionable cognition, hard of hearing contributing  Stable vitals  Room air today   SPL for coag eval  PT/OT evaluated patient on 1/3, no needs  Patient unable to care for self not safe to be discharged home    Pending placement    I have discussed this patient's plan of care and discharge plan at IDT rounds today with Case Management, Nursing, Nursing leadership, and other members of the IDT team.    Consultants/Specialty  ENT    Code Status  DNAR/DNI    Disposition  Patient is not medically cleared for discharge.   Anticipate discharge to  TBD .  I have placed the appropriate " orders for post-discharge needs.    Review of Systems  Review of Systems   Constitutional:  Negative for chills, fever and malaise/fatigue.   HENT:  Negative for nosebleeds and sore throat.         Nasal pain from fracture   Respiratory:  Positive for cough. Negative for shortness of breath.    Cardiovascular:  Negative for chest pain and palpitations.   Gastrointestinal:  Negative for abdominal pain, constipation, diarrhea, nausea and vomiting.   Musculoskeletal:  Negative for back pain and joint pain.   Neurological:  Positive for weakness. Negative for dizziness and headaches.   All other systems reviewed and are negative.     Physical Exam  Temp:  [36.7 °C (98.1 °F)-37.2 °C (99 °F)] 36.8 °C (98.2 °F)  Pulse:  [68-81] 68  Resp:  [16-20] 16  BP: ()/(56-80) 127/80  SpO2:  [90 %-91 %] 90 %    Physical Exam  Vitals and nursing note reviewed.   Constitutional:       General: She is not in acute distress.     Appearance: She is not ill-appearing.      Comments: Frail appearing elderly female   HENT:      Head:      Comments: Temporal muscle wasting positive     Nose:      Comments: Contusion around nose, no discharge, no bleeding     Mouth/Throat:      Mouth: Mucous membranes are dry.      Pharynx: No oropharyngeal exudate.   Eyes:      General: No scleral icterus.     Extraocular Movements: Extraocular movements intact.   Cardiovascular:      Rate and Rhythm: Normal rate and regular rhythm.      Pulses: Normal pulses.      Heart sounds: Normal heart sounds. No murmur heard.  Pulmonary:      Effort: Pulmonary effort is normal. No respiratory distress.      Breath sounds: Normal breath sounds. No wheezing or rhonchi.   Abdominal:      General: Abdomen is flat. Bowel sounds are normal. There is no distension.      Palpations: Abdomen is soft.      Tenderness: There is no abdominal tenderness.   Musculoskeletal:         General: No swelling or tenderness.      Cervical back: Normal range of motion. No rigidity.       Comments: Sarcopenic. Bilateral hand muscle atrophy noted.   Skin:     General: Skin is warm.      Capillary Refill: Capillary refill takes less than 2 seconds.      Coloration: Skin is not jaundiced.      Findings: No erythema.   Neurological:      Mental Status: She is alert. Mental status is at baseline.      Motor: Weakness present.      Comments: Aox2 (self, place)   Psychiatric:         Mood and Affect: Mood normal.         Behavior: Behavior normal.       Fluids    Intake/Output Summary (Last 24 hours) at 1/9/2023 1235  Last data filed at 1/8/2023 2200  Gross per 24 hour   Intake 60 ml   Output --   Net 60 ml       Laboratory                          Imaging  DX-CHEST-PORTABLE (1 VIEW)   Final Result      1.  Extensive right perihilar and lower lobe airspace disease with bronchial wall thickening. Findings could be due to contusion or multifocal pneumonia.   2.  Mildly enlarged cardiac silhouette with possible changes of the lateral right edema.      CT-CSPINE WITHOUT PLUS RECONS   Final Result      1.  There is no acute fracture of the cervical spine.   2.  There is degenerative disc disease and arthropathy predominantly at the C5-6 and C6-7 levels.   3.  Hazy opacities in the right upper lobe could represent edema or pneumonitis.         CT-MAXILLOFACIAL W/O PLUS RECONS   Final Result      1.  Mildly displaced and angulated right nasal bone fracture.   2.  Right maxillary sinus disease and some frothy secretions in the ethmoid sphenoid sinuses. Correlate for evidence of sinusitis.      CT-HEAD W/O   Final Result         1.  No acute intracranial abnormality.                Assessment/Plan  Hypomagnesemia- (present on admission)  Assessment & Plan  Patient does not have PIV  Replacing via PO  1.7    Closed fracture of nasal bone  Assessment & Plan  Mildly displaced and angulated right nasal bone fracture    I spoke with ENT, tried to perform bedside reduction but patient declined.  PT/OT  Pain regimen  ordered    Advance care planning  Assessment & Plan  Patient wishes to be DNR/DNI    Hypokalemia  Assessment & Plan  Monitor and replace  Improved    Community acquired pneumonia of right lung  Assessment & Plan  Patient presents with cough, hypoxia  Elevated procal 3.88, pending repeat.  WBC 11.4, now improved.  Blood cultures pending  Azithro. Patient refused new PIV, unable to use ceftriaxone. Adjusted to Augmentin 500mg Q8H (smaller tablet).  Supportive care  Remains on 2LNC    Acute respiratory failure with hypoxia (HCC)  Assessment & Plan  SpO2 86% on room air  Rhonchi on HPI exam, RR 22 upon admission  - continue 2LNC, wean as possible given patient is from [facility]  - treating pneumonia.    Hyponatremia- (present on admission)  Assessment & Plan  133  Decreasing. Likely due to dehydration. Pt does not have PIV and does not want another to be placed.  Encourage hydration, avoid over use of free water.         VTE prophylaxis: Xarelto 10 mg daily as prophylaxis       I have performed a physical exam and reviewed and updated ROS and Plan today (1/9/2023). In review of yesterday's note (1/8/2023), there are no changes except as documented above.

## 2023-01-10 PROCEDURE — 700102 HCHG RX REV CODE 250 W/ 637 OVERRIDE(OP): Performed by: INTERNAL MEDICINE

## 2023-01-10 PROCEDURE — A9270 NON-COVERED ITEM OR SERVICE: HCPCS | Performed by: GENERAL PRACTICE

## 2023-01-10 PROCEDURE — 86480 TB TEST CELL IMMUN MEASURE: CPT

## 2023-01-10 PROCEDURE — 700102 HCHG RX REV CODE 250 W/ 637 OVERRIDE(OP): Performed by: GENERAL PRACTICE

## 2023-01-10 PROCEDURE — 770006 HCHG ROOM/CARE - MED/SURG/GYN SEMI*

## 2023-01-10 PROCEDURE — 99231 SBSQ HOSP IP/OBS SF/LOW 25: CPT | Performed by: GENERAL PRACTICE

## 2023-01-10 PROCEDURE — A9270 NON-COVERED ITEM OR SERVICE: HCPCS | Performed by: INTERNAL MEDICINE

## 2023-01-10 PROCEDURE — 700102 HCHG RX REV CODE 250 W/ 637 OVERRIDE(OP): Performed by: STUDENT IN AN ORGANIZED HEALTH CARE EDUCATION/TRAINING PROGRAM

## 2023-01-10 PROCEDURE — 36415 COLL VENOUS BLD VENIPUNCTURE: CPT

## 2023-01-10 PROCEDURE — A9270 NON-COVERED ITEM OR SERVICE: HCPCS | Performed by: STUDENT IN AN ORGANIZED HEALTH CARE EDUCATION/TRAINING PROGRAM

## 2023-01-10 PROCEDURE — 700111 HCHG RX REV CODE 636 W/ 250 OVERRIDE (IP): Performed by: INTERNAL MEDICINE

## 2023-01-10 RX ORDER — ACETAMINOPHEN 325 MG/1
650 TABLET ORAL EVERY 6 HOURS PRN
Status: DISCONTINUED | OUTPATIENT
Start: 2023-01-10 | End: 2023-01-17 | Stop reason: HOSPADM

## 2023-01-10 RX ORDER — LOPERAMIDE HYDROCHLORIDE 2 MG/1
2 CAPSULE ORAL 4 TIMES DAILY PRN
Status: DISCONTINUED | OUTPATIENT
Start: 2023-01-10 | End: 2023-01-17 | Stop reason: HOSPADM

## 2023-01-10 RX ORDER — ACETAMINOPHEN 325 MG/1
650 TABLET ORAL 3 TIMES DAILY
Status: DISCONTINUED | OUTPATIENT
Start: 2023-01-10 | End: 2023-01-17 | Stop reason: HOSPADM

## 2023-01-10 RX ORDER — TRAMADOL HYDROCHLORIDE 50 MG/1
50 TABLET ORAL EVERY 6 HOURS PRN
Status: DISCONTINUED | OUTPATIENT
Start: 2023-01-10 | End: 2023-01-17 | Stop reason: HOSPADM

## 2023-01-10 RX ORDER — GUAIFENESIN 600 MG/1
1200 TABLET, EXTENDED RELEASE ORAL EVERY 12 HOURS
Status: DISCONTINUED | OUTPATIENT
Start: 2023-01-10 | End: 2023-01-12

## 2023-01-10 RX ADMIN — ALBUTEROL SULFATE 2 PUFF: 90 AEROSOL, METERED RESPIRATORY (INHALATION) at 00:18

## 2023-01-10 RX ADMIN — GUAIFENESIN 1200 MG: 600 TABLET, EXTENDED RELEASE ORAL at 12:19

## 2023-01-10 RX ADMIN — ACETAMINOPHEN 1000 MG: 500 TABLET ORAL at 09:04

## 2023-01-10 RX ADMIN — RIVAROXABAN 10 MG: 10 TABLET, FILM COATED ORAL at 17:20

## 2023-01-10 RX ADMIN — Medication 500 MG: at 06:41

## 2023-01-10 RX ADMIN — ALBUTEROL SULFATE 2 PUFF: 90 AEROSOL, METERED RESPIRATORY (INHALATION) at 22:00

## 2023-01-10 RX ADMIN — GUAIFENESIN 1200 MG: 600 TABLET, EXTENDED RELEASE ORAL at 20:17

## 2023-01-10 RX ADMIN — DOCUSATE SODIUM 50 MG AND SENNOSIDES 8.6 MG 2 TABLET: 8.6; 5 TABLET, FILM COATED ORAL at 05:53

## 2023-01-10 RX ADMIN — LOPERAMIDE HYDROCHLORIDE 2 MG: 2 CAPSULE ORAL at 12:19

## 2023-01-10 RX ADMIN — Medication 500 MG: at 17:20

## 2023-01-10 RX ADMIN — ONDANSETRON 4 MG: 4 TABLET, ORALLY DISINTEGRATING ORAL at 12:19

## 2023-01-10 RX ADMIN — ACETAMINOPHEN 650 MG: 325 TABLET, FILM COATED ORAL at 20:18

## 2023-01-10 RX ADMIN — TRAMADOL HYDROCHLORIDE 50 MG: 50 TABLET, COATED ORAL at 17:26

## 2023-01-10 RX ADMIN — ACETAMINOPHEN 650 MG: 325 TABLET, FILM COATED ORAL at 15:22

## 2023-01-10 RX ADMIN — OMEPRAZOLE 20 MG: 20 CAPSULE, DELAYED RELEASE ORAL at 05:52

## 2023-01-10 RX ADMIN — OXYCODONE HYDROCHLORIDE 2.5 MG: 5 TABLET ORAL at 09:04

## 2023-01-10 ASSESSMENT — PAIN SCALES - PAIN ASSESSMENT IN ADVANCED DEMENTIA (PAINAD)
FACIALEXPRESSION: SAD, FRIGHTENED, FROWN
CONSOLABILITY: DISTRACTED OR REASSURED BY VOICE/TOUCH
TOTALSCORE: 3
BREATHING: NORMAL
BODYLANGUAGE: TENSE, DISTRESSED PACING, FIDGETING

## 2023-01-10 ASSESSMENT — PAIN DESCRIPTION - PAIN TYPE
TYPE: ACUTE PAIN;CHRONIC PAIN

## 2023-01-10 ASSESSMENT — ENCOUNTER SYMPTOMS: FALLS: 1

## 2023-01-10 NOTE — CARE PLAN
Problem: Knowledge Deficit - Standard  Goal: Patient and family/care givers will demonstrate understanding of plan of care, disease process/condition, diagnostic tests and medications  Outcome: Progressing     Problem: Pain - Standard  Goal: Alleviation of pain or a reduction in pain to the patient’s comfort goal  Outcome: Progressing   The patient is Stable - Low risk of patient condition declining or worsening    Shift Goals  Clinical Goals: Free of falls, pain control  Patient Goals: Rest and comfort  Family Goals: NA    Progress made toward(s) clinical / shift goals:  Patient has pain medication scheduled around the clock to be pain free, calls prior to go to restroom to avoid falls.

## 2023-01-10 NOTE — DISCHARGE PLANNING
Case Management Discharge Planning    Admission Date: 12/31/2022  GMLOS: 4  ALOS: 10    6-Clicks ADL Score: 22  6-Clicks Mobility Score: 18      Anticipated Discharge Dispo: Discharge Disposition: Discharged to home/self care (01)    DME Needed: No    Action(s) Taken: Updated Provider/Nurse on Discharge Plan, DC Assessment Complete (See below), and OTHER    Pt discussed in morning rounds.Per chart review, pt is homeless with no family support. GH placement had been obtained with a [facility] in October of 2022 and pt declined. Pt would like to dc [facility]. Application provided to MD. Pt may not qualify due to concerns regarding pt not being fully independent with I/ADLS. Pt has been escalated to leadership.     Escalations Completed: Leadership    Medically Clear: Yes    Next Steps: LSW will follow up with leadership and MD regarding dc plan.     Barriers to Discharge: No Social Support and Homelessness    Is the patient up for discharge tomorrow: No      Care Transition Team Assessment    Information Source  Information Given By:  (chart review)  Who is responsible for making decisions for patient? : Patient    Readmission Evaluation  Is this a readmission?: No    Elopement Risk  Legal Hold: No  Ambulatory or Self Mobile in Wheelchair: Yes  Disoriented: No  Psychiatric Symptoms: None  History of Wandering: No  Elopement this Admit: No  Vocalizing Wanting to Leave: No  Displays Behaviors, Body Language Wanting to Leave: No-Not at Risk for Elopement  Elopement Risk: Not at Risk for Elopement  Wanderguard On: Unavailable    Interdisciplinary Discharge Planning  Lives with - Patient's Self Care Capacity: Alone and Able to Care For Self  Patient or legal guardian wants to designate a caregiver: No  Support Systems: Shelter  Housing / Facility: Homeless  Prior Services: None  Durable Medical Equipment: Not Applicable    Discharge Preparedness  What is your plan after discharge?: Home with help  What are your  discharge supports?: Other (comment) ([facility] has been a source of support. Pt has no family support and is homeless.)  Prior Functional Level: Independent with Activities of Daily Living, Independent with Medication Management, Uses Cane, Uses Walker, Uses Wheelchair  Difficulity with ADLs: None  Difficulity with IADLs: None    Functional Assesment  Prior Functional Level: Independent with Activities of Daily Living, Independent with Medication Management, Uses Cane, Uses Walker, Uses Wheelchair    Finances  Financial Barriers to Discharge: No (SS $1500/month)  Prescription Coverage: Yes    Vision / Hearing Impairment  Vision Impairment : Yes  Right Eye Vision: Wears Glasses  Left Eye Vision: Impaired, Wears Glasses  Hearing Impairment : Yes  Hearing Impairment: Both Ears, Hearing Device Not Available  Does Pt Need Special Equipment for the Hearing Impaired?: Yes-But Does not Need for Facility to Arrange Equipment    Advance Directive  Advance Directive?: None    Domestic Abuse  Have you ever been the victim of abuse or violence?: No  Physical Abuse or Sexual Abuse: No  Verbal Abuse or Emotional Abuse: No  Possible Abuse/Neglect Reported to:: Not Applicable    Psychological Assessment  History of Substance Abuse: None  History of Psychiatric Problems: No  Non-compliant with Treatment: No  Newly Diagnosed Illness: Yes         Anticipated Discharge Information  Discharge Disposition: Discharged to home/self care (01)

## 2023-01-10 NOTE — THERAPY
SLP Contact Note    New orders received for a cognitive-linguistic evaluation. Please refer to cognitive-linguistic evaluation done 1/2/23 for results and recommendations. SLP will complete the new orders.    Hiral Muller MS, CCC-SLP  X. [phone]

## 2023-01-10 NOTE — DISCHARGE PLANNING
LSW emailed [facility] Intake Packet to  Samantha. LSW also spoke to Samantha via telephone and confirmed she did receive the Intake Packet. Samantha reviewed the intake packet and Pt cannot be admitted with mobility issues. Team was updated in IDT rounds.

## 2023-01-11 PROBLEM — Z59.89 AWAITING ACTION FOR HOUSING: Status: ACTIVE | Noted: 2023-01-11

## 2023-01-11 LAB
GAMMA INTERFERON BACKGROUND BLD IA-ACNC: 0.1 IU/ML
M TB IFN-G BLD-IMP: NEGATIVE
M TB IFN-G CD4+ BCKGRND COR BLD-ACNC: -0.02 IU/ML
MITOGEN IGNF BCKGRD COR BLD-ACNC: >10 IU/ML
QFT TB2 - NIL TBQ2: 0.02 IU/ML

## 2023-01-11 PROCEDURE — A9270 NON-COVERED ITEM OR SERVICE: HCPCS | Performed by: INTERNAL MEDICINE

## 2023-01-11 PROCEDURE — 99231 SBSQ HOSP IP/OBS SF/LOW 25: CPT | Performed by: GENERAL PRACTICE

## 2023-01-11 PROCEDURE — 770006 HCHG ROOM/CARE - MED/SURG/GYN SEMI*

## 2023-01-11 PROCEDURE — 700102 HCHG RX REV CODE 250 W/ 637 OVERRIDE(OP): Performed by: INTERNAL MEDICINE

## 2023-01-11 PROCEDURE — 700102 HCHG RX REV CODE 250 W/ 637 OVERRIDE(OP): Performed by: GENERAL PRACTICE

## 2023-01-11 PROCEDURE — A9270 NON-COVERED ITEM OR SERVICE: HCPCS | Performed by: GENERAL PRACTICE

## 2023-01-11 RX ADMIN — ACETAMINOPHEN 650 MG: 325 TABLET, FILM COATED ORAL at 09:32

## 2023-01-11 RX ADMIN — OMEPRAZOLE 20 MG: 20 CAPSULE, DELAYED RELEASE ORAL at 06:14

## 2023-01-11 RX ADMIN — GUAIFENESIN SYRUP AND DEXTROMETHORPHAN 10 ML: 100; 10 SYRUP ORAL at 02:29

## 2023-01-11 RX ADMIN — RIVAROXABAN 10 MG: 10 TABLET, FILM COATED ORAL at 17:07

## 2023-01-11 RX ADMIN — GUAIFENESIN 1200 MG: 600 TABLET, EXTENDED RELEASE ORAL at 06:16

## 2023-01-11 RX ADMIN — TRAMADOL HYDROCHLORIDE 50 MG: 50 TABLET, COATED ORAL at 06:15

## 2023-01-11 RX ADMIN — GUAIFENESIN 1200 MG: 600 TABLET, EXTENDED RELEASE ORAL at 17:07

## 2023-01-11 RX ADMIN — Medication 500 MG: at 06:23

## 2023-01-11 RX ADMIN — Medication 500 MG: at 17:07

## 2023-01-11 RX ADMIN — ALBUTEROL SULFATE 2 PUFF: 90 AEROSOL, METERED RESPIRATORY (INHALATION) at 02:09

## 2023-01-11 ASSESSMENT — ENCOUNTER SYMPTOMS: FALLS: 1

## 2023-01-11 ASSESSMENT — PAIN DESCRIPTION - PAIN TYPE: TYPE: ACUTE PAIN

## 2023-01-11 ASSESSMENT — PAIN SCALES - WONG BAKER: WONGBAKER_NUMERICALRESPONSE: HURTS A LITTLE MORE

## 2023-01-11 NOTE — PROGRESS NOTES
[facility] Medicine Daily Progress Note    Date of Service  1/11/2023    Chief Complaint  Alyssa Funez is a 89 y.o. female admitted 12/31/2022 with GLF    Hospital Course  This is an 89-year-old female with past medical history of psychiatric disorder, lobectomy in 1968, and asthma who was admitted on 12/31/2022 after sustaining a fall at [facility], significant for acute hypoxemic respiratory failure secondary to CAP and nasal fracture.    ENT was consulted for patient's nasal fracture, she refused fracture to be reduced at bedside.    Patient initially required oxygen, but she has been weaned off of this as she completed her antibiotic course for her pneumonia.    Patient evaluated by PT/OT with no further recommendations.  This is a difficult discharge to case management is assisting in, given patient is homeless and unable to care for herself, needs some assistance with ADLs, and unable to discharge back to [facility].    Interval Problem Update  It is evident patient does not capacity  Will apply for guardianship  Patient to discharge to group [facility]  QuantiFERON negative 10/2022    Patient seen with CMO, Dr. Soria today.    I have discussed this patient's plan of care and discharge plan at IDT rounds today with Case Management, Nursing, Nursing leadership, and other members of the IDT team.    Consultants/Specialty  ENT    Code Status  DNAR/DNI    Disposition  Patient is medically cleared for discharge.   Anticipate discharge to   .  I have placed the appropriate orders for post-discharge needs.    Review of Systems  Review of Systems   Musculoskeletal:  Positive for falls and joint pain.   All other systems reviewed and are negative.     Physical Exam  Temp:  [36.9 °C (98.4 °F)-37.3 °C (99.2 °F)] 36.9 °C (98.4 °F)  Pulse:  [60-76] 76  Resp:  [14-19] 14  BP: ()/(49-70) 98/51  SpO2:  [89 %-92 %] 89 %    Physical Exam  Vitals and nursing note reviewed.   Constitutional:       General: She is not in  acute distress.     Appearance: Normal appearance.   HENT:      Head: Normocephalic and atraumatic.      Mouth/Throat:      Mouth: Mucous membranes are moist.      Pharynx: No oropharyngeal exudate.   Eyes:      Extraocular Movements: Extraocular movements intact.      Pupils: Pupils are equal, round, and reactive to light.   Cardiovascular:      Rate and Rhythm: Normal rate and regular rhythm.      Pulses: Normal pulses.      Heart sounds: No murmur heard.    No friction rub. No gallop.   Pulmonary:      Effort: Pulmonary effort is normal. No respiratory distress.      Breath sounds: No wheezing, rhonchi or rales.   Abdominal:      General: Bowel sounds are normal. There is no distension.      Palpations: Abdomen is soft. There is no mass.      Tenderness: There is no abdominal tenderness.   Musculoskeletal:         General: No swelling or tenderness. Normal range of motion.      Cervical back: Normal range of motion. No rigidity. No muscular tenderness.      Right lower leg: No edema.      Left lower leg: No edema.   Skin:     General: Skin is warm and dry.      Capillary Refill: Capillary refill takes less than 2 seconds.      Findings: No erythema or rash.   Neurological:      General: No focal deficit present.      Mental Status: She is alert and oriented to person, place, and time.      Motor: No weakness.      Gait: Gait normal.       Fluids    Intake/Output Summary (Last 24 hours) at 1/11/2023 1338  Last data filed at 1/10/2023 2200  Gross per 24 hour   Intake 60 ml   Output --   Net 60 ml       Laboratory                        Imaging  DX-CHEST-PORTABLE (1 VIEW)   Final Result      1.  Extensive right perihilar and lower lobe airspace disease with bronchial wall thickening. Findings could be due to contusion or multifocal pneumonia.   2.  Mildly enlarged cardiac silhouette with possible changes of the lateral right edema.      CT-CSPINE WITHOUT PLUS RECONS   Final Result      1.  There is no acute fracture  of the cervical spine.   2.  There is degenerative disc disease and arthropathy predominantly at the C5-6 and C6-7 levels.   3.  Hazy opacities in the right upper lobe could represent edema or pneumonitis.         CT-MAXILLOFACIAL W/O PLUS RECONS   Final Result      1.  Mildly displaced and angulated right nasal bone fracture.   2.  Right maxillary sinus disease and some frothy secretions in the ethmoid sphenoid sinuses. Correlate for evidence of sinusitis.      CT-HEAD W/O   Final Result         1.  No acute intracranial abnormality.              Assessment/Plan  * Community acquired pneumonia of right lung  Assessment & Plan  Patient presents with cough, hypoxia  Elevated procal 3.88, pending repeat.  WBC 11.4, now improved.  Blood cultures pending  Azithro. Patient refused new PIV, unable to use ceftriaxone. Adjusted to Augmentin 500mg Q8H (smaller tablet).  Supportive care  RESOLVED    Awaiting action for housing  Assessment & Plan  It is evident patient does not capacity  Will apply for guardianship  Patient to discharge to group home  QuantiFERON negative 10/2022    Closed fracture of nasal bone  Assessment & Plan  Mildly displaced and angulated right nasal bone fracture    Spoke with ENT, tried to perform bedside reduction but patient declined.  PT/OT  Pain regimen ordered    Hypomagnesemia- (present on admission)  Assessment & Plan  Patient does not have PIV  Replacing via PO  RESOLVED    Advance care planning  Assessment & Plan  Patient wishes to be DNR/DNI    Hypokalemia  Assessment & Plan  Monitor and replace  RESOLVED    Acute respiratory failure with hypoxia (HCC)  Assessment & Plan  SpO2 86% on room air  Rhonchi on HPI exam, RR 22 upon admission  - continue 2LNC, wean as possible given patient is from [facility]  - treating pneumonia.  RESOLVED on RA    Hyponatremia- (present on admission)  Assessment & Plan  133  Decreasing. Likely due to dehydration. Pt does not have PIV and does not want another  to be placed.  Encourage hydration, avoid over use of free water.  RESOLVED         VTE prophylaxis: Xarelto 10 mg daily as prophylaxis    I have performed a physical exam and reviewed and updated ROS and Plan today (1/11/2023). In review of yesterday's note (1/10/2023), there are no changes except as documented above.

## 2023-01-11 NOTE — CARE PLAN
The patient is Stable - Low risk of patient condition declining or worsening    Shift Goals  Clinical Goals: fall free, pain control  Patient Goals: rest comfort  Family Goals: N/A    Progress made toward(s) clinical / shift goals:  f    Patient is not progressing towards the following goals:      Problem: Knowledge Deficit - Standard  Goal: Patient and family/care givers will demonstrate understanding of plan of care, disease process/condition, diagnostic tests and medications  Outcome: Not Progressing  Problem: Pain - Standard  Goal: Alleviation of pain or a reduction in pain to the patient’s comfort goal  Outcome: Progressing     Problem: Fall Risk  Goal: Patient will remain free from falls  Outcome: Progressing

## 2023-01-11 NOTE — PROGRESS NOTES
"Hospital Medicine Daily Progress Note    Date of Service  1/10/2023    Chief Complaint  Alyssa Funez is a 89 y.o. female admitted 12/31/2022 with GLF    Hospital Course  This is an 89-year-old female with past medical history of psychiatric disorder, lobectomy in 1968, and asthma who was admitted on 12/31/2022 after sustaining a fall at [facility], significant for acute hypoxemic respiratory failure secondary to CAP and nasal fracture.    ENT was consulted for patient's nasal fracture, she refused fracture to be reduced at bedside.    Patient initially required oxygen, but she has been weaned off of this as she completed her antibiotic course for her pneumonia.    Patient evaluated by PT/OT with no further recommendations.  This is a difficult discharge to case management is assisting in, given patient is homeless and unable to care for herself, needs some assistance with ADLs, and unable to discharge back to [facility].    Interval Problem Update  Patient is medically clear.    Patient does require some assistance with ADLs and with transfers, she does not qualify for [facility] due to having mobility issues.    Case management is assisting difficult discharge planning.      I have discussed this patient's plan of care and discharge plan at IDT rounds today with Case Management, Nursing, Nursing leadership, and other members of the IDT team.    Consultants/Specialty  ENT    Code Status  DNAR/DNI    Disposition  Patient is medically cleared for discharge.   Anticipate discharge to   .  I have placed the appropriate orders for post-discharge needs.    Review of Systems  Review of Systems   Musculoskeletal:  Positive for falls and joint pain.   All other systems reviewed and are negative.     Physical Exam  Temp:  [36.4 °C (97.5 °F)-37.3 °C (99.2 °F)] 37.3 °C (99.2 °F)  Pulse:  [65-70] 67  Resp:  [16-19] 18  BP: ()/(53-70) 132/70  SpO2:  [90 %-92 %] 90 %    Physical Exam  Vitals and " nursing note reviewed.   Constitutional:       General: She is not in acute distress.     Appearance: Normal appearance.   HENT:      Head: Normocephalic and atraumatic.      Mouth/Throat:      Mouth: Mucous membranes are moist.      Pharynx: No oropharyngeal exudate.   Eyes:      Extraocular Movements: Extraocular movements intact.      Pupils: Pupils are equal, round, and reactive to light.   Cardiovascular:      Rate and Rhythm: Normal rate and regular rhythm.      Pulses: Normal pulses.      Heart sounds: No murmur heard.    No friction rub. No gallop.   Pulmonary:      Effort: Pulmonary effort is normal. No respiratory distress.      Breath sounds: No wheezing, rhonchi or rales.   Abdominal:      General: Bowel sounds are normal. There is no distension.      Palpations: Abdomen is soft. There is no mass.      Tenderness: There is no abdominal tenderness.   Musculoskeletal:         General: No swelling or tenderness. Normal range of motion.      Cervical back: Normal range of motion. No rigidity. No muscular tenderness.      Right lower leg: No edema.      Left lower leg: No edema.   Skin:     General: Skin is warm and dry.      Capillary Refill: Capillary refill takes less than 2 seconds.      Findings: No erythema or rash.   Neurological:      General: No focal deficit present.      Mental Status: She is alert and oriented to person, place, and time.      Motor: No weakness.      Gait: Gait normal.       Fluids    Intake/Output Summary (Last 24 hours) at 1/10/2023 1742  Last data filed at 1/10/2023 1300  Gross per 24 hour   Intake 840 ml   Output --   Net 840 ml       Laboratory                        Imaging  DX-CHEST-PORTABLE (1 VIEW)   Final Result      1.  Extensive right perihilar and lower lobe airspace disease with bronchial wall thickening. Findings could be due to contusion or multifocal pneumonia.   2.  Mildly enlarged cardiac silhouette with possible changes of the lateral right edema.       CT-CSPINE WITHOUT PLUS RECONS   Final Result      1.  There is no acute fracture of the cervical spine.   2.  There is degenerative disc disease and arthropathy predominantly at the C5-6 and C6-7 levels.   3.  Hazy opacities in the right upper lobe could represent edema or pneumonitis.         CT-MAXILLOFACIAL W/O PLUS RECONS   Final Result      1.  Mildly displaced and angulated right nasal bone fracture.   2.  Right maxillary sinus disease and some frothy secretions in the ethmoid sphenoid sinuses. Correlate for evidence of sinusitis.      CT-HEAD W/O   Final Result         1.  No acute intracranial abnormality.              Assessment/Plan  * Community acquired pneumonia of right lung  Assessment & Plan  Patient presents with cough, hypoxia  Elevated procal 3.88, pending repeat.  WBC 11.4, now improved.  Blood cultures pending  Azithro. Patient refused new PIV, unable to use ceftriaxone. Adjusted to Augmentin 500mg Q8H (smaller tablet).  Supportive care  RESOLVED    Closed fracture of nasal bone  Assessment & Plan  Mildly displaced and angulated right nasal bone fracture    Spoke with ENT, tried to perform bedside reduction but patient declined.  PT/OT  Pain regimen ordered    Hypomagnesemia- (present on admission)  Assessment & Plan  Patient does not have PIV  Replacing via PO  RESOLVED    Advance care planning  Assessment & Plan  Patient wishes to be DNR/DNI    Hypokalemia  Assessment & Plan  Monitor and replace  RESOLVED    Acute respiratory failure with hypoxia (HCC)  Assessment & Plan  SpO2 86% on room air  Rhonchi on HPI exam, RR 22 upon admission  - continue 2LNC, wean as possible given patient is from [facility]  - treating pneumonia.  RESOLVED on RA    Hyponatremia- (present on admission)  Assessment & Plan  133  Decreasing. Likely due to dehydration. Pt does not have PIV and does not want another to be placed.  Encourage hydration, avoid over use of free water.  RESOLVED         VTE prophylaxis:  Xarelto 10 mg daily as prophylaxis    I have performed a physical exam and reviewed and updated ROS and Plan today (1/10/2023). In review of yesterday's note (1/9/2023), there are no changes except as documented above.

## 2023-01-11 NOTE — DISCHARGE PLANNING
Case Management Discharge Planning    Admission Date: 12/31/2022  GMLOS: 4  ALOS: 11    6-Clicks ADL Score: 22  6-Clicks Mobility Score: 18      Anticipated Discharge Dispo: Discharge Disposition: Discharged to home/self care (01)    DME Needed: No    Action(s) Taken: Updated Provider/Nurse on Discharge Plan    Escalations Completed: Long Length of Stay Committee     Medically Clear: Yes    Next Steps: follow up with Group Home    Barriers to Discharge: Pending Placement    Is the patient up for discharge tomorrow: No    Staffed patient's discharge plan at Long Length of Stay.  Dr Soria will assess for capacity to see if patient can discharge to the shelter or needs a guardian.    Volt from Dr Soria, requesting follow up with a group home and guardianship.    PC to Gayathri Henderson at [facility] [phone], has an opening for a female, SW provided information requested.  Mercedes stated she will be out later today/tomorrow at the latest.

## 2023-01-11 NOTE — ASSESSMENT & PLAN NOTE
It is evident patient does not capacity  Will apply for guardianship  Patient to discharge to group home  QuantiFERON negative 10/2022

## 2023-01-11 NOTE — PROGRESS NOTES
"Patient became suspicious and paranoid saying that \"nurse was doing something inappropriate because I tried to scan her arm band to dispense medication\" \"I am always has been a good girl, never drank, and never smoke\". Refused allowing me to use her call bell to turn the light, \"It's mine, go and get your own. I paid for it\" Left her be alone for an hour then I brought a can of coke to go along with me dispensing the meds. Very forgetful. Made me repeat what kind of meds she was receiving.  "

## 2023-01-11 NOTE — CARE PLAN
Problem: Knowledge Deficit - Standard  Goal: Patient and family/care givers will demonstrate understanding of plan of care, disease process/condition, diagnostic tests and medications  Outcome: Progressing     Problem: Pain - Standard  Goal: Alleviation of pain or a reduction in pain to the patient’s comfort goal  Outcome: Progressing     Problem: Fall Risk  Goal: Patient will remain free from falls  Outcome: Progressing   The patient is Stable - Low risk of patient condition declining or worsening    Shift Goals  Clinical Goals: Monitor VS, labs results  Patient Goals: rest comfort  Family Goals: N/A    Progress made toward(s) clinical / shift goals:  Patient at time, seems confused, forgetful. Asking for     Patient is not progressing towards the following goals:

## 2023-01-12 ENCOUNTER — APPOINTMENT (OUTPATIENT)
Dept: RADIOLOGY | Facility: MEDICAL CENTER | Age: 88
DRG: 194 | End: 2023-01-12
Attending: GENERAL PRACTICE
Payer: MEDICARE

## 2023-01-12 PROCEDURE — 700102 HCHG RX REV CODE 250 W/ 637 OVERRIDE(OP): Performed by: GENERAL PRACTICE

## 2023-01-12 PROCEDURE — 770006 HCHG ROOM/CARE - MED/SURG/GYN SEMI*

## 2023-01-12 PROCEDURE — 700101 HCHG RX REV CODE 250: Performed by: GENERAL PRACTICE

## 2023-01-12 PROCEDURE — 99231 SBSQ HOSP IP/OBS SF/LOW 25: CPT | Performed by: GENERAL PRACTICE

## 2023-01-12 PROCEDURE — 71045 X-RAY EXAM CHEST 1 VIEW: CPT

## 2023-01-12 PROCEDURE — A9270 NON-COVERED ITEM OR SERVICE: HCPCS | Performed by: INTERNAL MEDICINE

## 2023-01-12 PROCEDURE — A9270 NON-COVERED ITEM OR SERVICE: HCPCS | Performed by: GENERAL PRACTICE

## 2023-01-12 PROCEDURE — C9803 HOPD COVID-19 SPEC COLLECT: HCPCS | Performed by: GENERAL PRACTICE

## 2023-01-12 PROCEDURE — 700102 HCHG RX REV CODE 250 W/ 637 OVERRIDE(OP): Performed by: INTERNAL MEDICINE

## 2023-01-12 PROCEDURE — 94760 N-INVAS EAR/PLS OXIMETRY 1: CPT

## 2023-01-12 RX ORDER — GUAIFENESIN 600 MG/1
1200 TABLET, EXTENDED RELEASE ORAL EVERY 12 HOURS
Status: DISPENSED | OUTPATIENT
Start: 2023-01-12 | End: 2023-01-16

## 2023-01-12 RX ORDER — IPRATROPIUM BROMIDE AND ALBUTEROL SULFATE 2.5; .5 MG/3ML; MG/3ML
3 SOLUTION RESPIRATORY (INHALATION) ONCE
Status: COMPLETED | OUTPATIENT
Start: 2023-01-12 | End: 2023-01-12

## 2023-01-12 RX ADMIN — IPRATROPIUM BROMIDE AND ALBUTEROL SULFATE 3 ML: 2.5; .5 SOLUTION RESPIRATORY (INHALATION) at 10:49

## 2023-01-12 RX ADMIN — Medication 500 MG: at 17:16

## 2023-01-12 RX ADMIN — GUAIFENESIN 1200 MG: 600 TABLET, EXTENDED RELEASE ORAL at 17:16

## 2023-01-12 RX ADMIN — ACETAMINOPHEN 650 MG: 325 TABLET, FILM COATED ORAL at 08:52

## 2023-01-12 RX ADMIN — ACETAMINOPHEN 650 MG: 325 TABLET, FILM COATED ORAL at 15:31

## 2023-01-12 RX ADMIN — RIVAROXABAN 10 MG: 10 TABLET, FILM COATED ORAL at 17:16

## 2023-01-12 ASSESSMENT — PAIN DESCRIPTION - PAIN TYPE
TYPE: ACUTE PAIN

## 2023-01-12 ASSESSMENT — ENCOUNTER SYMPTOMS: FALLS: 1

## 2023-01-12 ASSESSMENT — PAIN SCALES - WONG BAKER: WONGBAKER_NUMERICALRESPONSE: HURTS JUST A LITTLE BIT

## 2023-01-12 NOTE — DISCHARGE PLANNING
Medical Social Work  PC to Viky at [facility], [phone], ANGEL told that they do have an opening, SW provided basic information on patient.  Viky stated she can come out today and assess

## 2023-01-12 NOTE — PROGRESS NOTES
Received patient resting in bed asleep, arousable.  Did not want to have vital signs checked, refused and removed b/p cuff and threw it at CNA. No PIV in place, MD aware. Bed in low position, wheels locked, side rails up x2, call light and personal belongings within reach.

## 2023-01-12 NOTE — PROGRESS NOTES
Patient refused vital signs and meds this morning. Requested to let her sleep and leave her alone.

## 2023-01-12 NOTE — CARE PLAN
The patient is Stable - Low risk of patient condition declining or worsening    Shift Goals  Clinical Goals: safety  Patient Goals: rest/comfort  Family Goals: N/A    Progress made toward(s) clinical / shift goals:  progressing   Pain controlled with current pain regimen.  Fall risk regimen in place.    Patient is not progressing towards the following goals:

## 2023-01-13 PROBLEM — J11.1 INFLUENZA: Status: ACTIVE | Noted: 2023-01-13

## 2023-01-13 LAB
FLUAV RNA SPEC QL NAA+PROBE: POSITIVE
FLUBV RNA SPEC QL NAA+PROBE: NEGATIVE
RSV RNA SPEC QL NAA+PROBE: NEGATIVE
SARS-COV-2 RNA RESP QL NAA+PROBE: NOTDETECTED
SPECIMEN SOURCE: ABNORMAL

## 2023-01-13 PROCEDURE — A9270 NON-COVERED ITEM OR SERVICE: HCPCS | Performed by: INTERNAL MEDICINE

## 2023-01-13 PROCEDURE — 0241U HCHG SARS-COV-2 COVID-19 NFCT DS RESP RNA 4 TRGT MIC: CPT

## 2023-01-13 PROCEDURE — 700102 HCHG RX REV CODE 250 W/ 637 OVERRIDE(OP): Performed by: INTERNAL MEDICINE

## 2023-01-13 PROCEDURE — 99232 SBSQ HOSP IP/OBS MODERATE 35: CPT | Performed by: GENERAL PRACTICE

## 2023-01-13 PROCEDURE — A9270 NON-COVERED ITEM OR SERVICE: HCPCS | Performed by: GENERAL PRACTICE

## 2023-01-13 PROCEDURE — 94760 N-INVAS EAR/PLS OXIMETRY 1: CPT

## 2023-01-13 PROCEDURE — 770001 HCHG ROOM/CARE - MED/SURG/GYN PRIV*

## 2023-01-13 PROCEDURE — 700102 HCHG RX REV CODE 250 W/ 637 OVERRIDE(OP): Performed by: GENERAL PRACTICE

## 2023-01-13 PROCEDURE — 94640 AIRWAY INHALATION TREATMENT: CPT

## 2023-01-13 RX ORDER — SODIUM CHLORIDE 9 MG/ML
500 INJECTION, SOLUTION INTRAVENOUS ONCE
Status: DISCONTINUED | OUTPATIENT
Start: 2023-01-13 | End: 2023-01-14

## 2023-01-13 RX ORDER — OSELTAMIVIR PHOSPHATE 75 MG/1
75 CAPSULE ORAL 2 TIMES DAILY
Status: DISCONTINUED | OUTPATIENT
Start: 2023-01-13 | End: 2023-01-17 | Stop reason: HOSPADM

## 2023-01-13 RX ADMIN — OSELTAMIVIR PHOSPHATE 75 MG: 75 CAPSULE ORAL at 12:56

## 2023-01-13 RX ADMIN — ACETAMINOPHEN 650 MG: 325 TABLET, FILM COATED ORAL at 11:15

## 2023-01-13 RX ADMIN — ALBUTEROL SULFATE 2 PUFF: 90 AEROSOL, METERED RESPIRATORY (INHALATION) at 11:10

## 2023-01-13 RX ADMIN — ACETAMINOPHEN 650 MG: 325 TABLET, FILM COATED ORAL at 17:09

## 2023-01-13 RX ADMIN — OSELTAMIVIR PHOSPHATE 75 MG: 75 CAPSULE ORAL at 17:09

## 2023-01-13 RX ADMIN — GUAIFENESIN 1200 MG: 600 TABLET, EXTENDED RELEASE ORAL at 17:10

## 2023-01-13 RX ADMIN — ACETAMINOPHEN 650 MG: 325 TABLET, FILM COATED ORAL at 21:36

## 2023-01-13 RX ADMIN — Medication 500 MG: at 05:08

## 2023-01-13 RX ADMIN — Medication 500 MG: at 17:10

## 2023-01-13 RX ADMIN — RIVAROXABAN 10 MG: 10 TABLET, FILM COATED ORAL at 17:09

## 2023-01-13 RX ADMIN — GUAIFENESIN 1200 MG: 600 TABLET, EXTENDED RELEASE ORAL at 05:09

## 2023-01-13 RX ADMIN — OMEPRAZOLE 20 MG: 20 CAPSULE, DELAYED RELEASE ORAL at 05:09

## 2023-01-13 ASSESSMENT — PAIN SCALES - WONG BAKER: WONGBAKER_NUMERICALRESPONSE: DOESN'T HURT AT ALL

## 2023-01-13 ASSESSMENT — PAIN DESCRIPTION - PAIN TYPE
TYPE: ACUTE PAIN

## 2023-01-13 ASSESSMENT — ENCOUNTER SYMPTOMS: FALLS: 1

## 2023-01-13 NOTE — CARE PLAN
The patient is Stable - Low risk of patient condition declining or worsening    Shift Goals  Clinical Goals: safety  Patient Goals: rest  Family Goals: N/A    Progress made toward(s) clinical / shift goals:      Problem: Knowledge Deficit - Standard  Goal: Patient and family/care givers will demonstrate understanding of plan of care, disease process/condition, diagnostic tests and medications  Outcome: Progressing     Problem: Pain - Standard  Goal: Alleviation of pain or a reduction in pain to the patient’s comfort goal  Outcome: Progressing     Problem: Fall Risk  Goal: Patient will remain free from falls  Outcome: Progressing     Problem: Skin Integrity  Goal: Skin integrity is maintained or improved  Outcome: Progressing

## 2023-01-13 NOTE — ASSESSMENT & PLAN NOTE
Tested positive on 1/13  Started patient on Tamiflu  Supportive care  Patient is currently on 1 L of oxygen  Wean as tolerated

## 2023-01-13 NOTE — PROGRESS NOTES
Report received from NOC RN and assumed patient care at 0700. Patient is A&Ox 4, on 3L/min nasal canula, and has a bed alarm on.  Patient reporting a pain level of 0. Call light within reach and bed in lowest position. Reinforced the need to call for assistance. Plan of care discussed and patient does not have any further needs at this time.

## 2023-01-13 NOTE — PROGRESS NOTES
Hospital Medicine Daily Progress Note    Date of Service  1/12/2023    Chief Complaint  Alyssa Funez is a 89 y.o. female admitted 12/31/2022 with GLF    Hospital Course  This is an 89-year-old female with past medical history of psychiatric disorder, lobectomy in 1968, and asthma who was admitted on 12/31/2022 after sustaining a fall at [facility], significant for acute hypoxemic respiratory failure secondary to CAP and nasal fracture.    ENT was consulted for patient's nasal fracture, she refused fracture to be reduced at bedside.    Patient initially required oxygen, but she has been weaned off of this as she completed her antibiotic course for her pneumonia.    Patient evaluated by PT/OT with no further recommendations.  This is a difficult discharge to case management is assisting in, given patient is homeless and unable to care for herself, needs some assistance with ADLs, and unable to discharge back to [facility].     Case management assisting in setting up patient with a group home.  QuantiFERON gold negative 1/2022.    Interval Problem Update  It is evident patient does not capacity  Will apply for guardianship  Patient to discharge to group home, to be seen by them tomorrow.  QuantiFERON negative 10/2022    Patient with productive cough, chest x-ray negative.  Patient swabbed for COVID/influenza/RSV.    I have discussed this patient's plan of care and discharge plan at IDT rounds today with Case Management, Nursing, Nursing leadership, and other members of the IDT team.    Consultants/Specialty  ENT    Code Status  DNAR/DNI    Disposition  Patient is medically cleared for discharge.   Anticipate discharge to   .  I have placed the appropriate orders for post-discharge needs.    Review of Systems  Review of Systems   Musculoskeletal:  Positive for falls and joint pain.   All other systems reviewed and are negative.     Physical Exam  Temp:  [36.3 °C (97.4 °F)-37.1 °C (98.7 °F)] 37.1 °C (98.7  °F)  Pulse:  [72-73] 72  Resp:  [18] 18  BP: ()/(57-63) 90/63  SpO2:  [86 %-96 %] 96 %    Physical Exam  Vitals and nursing note reviewed.   Constitutional:       General: She is not in acute distress.     Appearance: Normal appearance.   HENT:      Head: Normocephalic and atraumatic.      Mouth/Throat:      Mouth: Mucous membranes are moist.      Pharynx: No oropharyngeal exudate.   Eyes:      Extraocular Movements: Extraocular movements intact.      Pupils: Pupils are equal, round, and reactive to light.   Cardiovascular:      Rate and Rhythm: Normal rate and regular rhythm.      Pulses: Normal pulses.      Heart sounds: No murmur heard.    No friction rub. No gallop.   Pulmonary:      Effort: Pulmonary effort is normal. No respiratory distress.      Breath sounds: No wheezing, rhonchi or rales.   Abdominal:      General: Bowel sounds are normal. There is no distension.      Palpations: Abdomen is soft. There is no mass.      Tenderness: There is no abdominal tenderness.   Musculoskeletal:         General: No swelling or tenderness. Normal range of motion.      Cervical back: Normal range of motion. No rigidity. No muscular tenderness.      Right lower leg: No edema.      Left lower leg: No edema.   Skin:     General: Skin is warm and dry.      Capillary Refill: Capillary refill takes less than 2 seconds.      Findings: No erythema or rash.   Neurological:      General: No focal deficit present.      Mental Status: She is alert and oriented to person, place, and time.      Motor: No weakness.      Gait: Gait normal.       Fluids    Intake/Output Summary (Last 24 hours) at 1/12/2023 1716  Last data filed at 1/12/2023 1400  Gross per 24 hour   Intake 960 ml   Output --   Net 960 ml         Laboratory                        Imaging  DX-CHEST-PORTABLE (1 VIEW)   Final Result      Improving airspace opacity in the right lung.      DX-CHEST-PORTABLE (1 VIEW)   Final Result      1.  Extensive right perihilar and  lower lobe airspace disease with bronchial wall thickening. Findings could be due to contusion or multifocal pneumonia.   2.  Mildly enlarged cardiac silhouette with possible changes of the lateral right edema.      CT-CSPINE WITHOUT PLUS RECONS   Final Result      1.  There is no acute fracture of the cervical spine.   2.  There is degenerative disc disease and arthropathy predominantly at the C5-6 and C6-7 levels.   3.  Hazy opacities in the right upper lobe could represent edema or pneumonitis.         CT-MAXILLOFACIAL W/O PLUS RECONS   Final Result      1.  Mildly displaced and angulated right nasal bone fracture.   2.  Right maxillary sinus disease and some frothy secretions in the ethmoid sphenoid sinuses. Correlate for evidence of sinusitis.      CT-HEAD W/O   Final Result         1.  No acute intracranial abnormality.              Assessment/Plan  * Community acquired pneumonia of right lung  Assessment & Plan  Patient presents with cough, hypoxia  Elevated procal 3.88, pending repeat.  WBC 11.4, now improved.  Blood cultures pending  Azithro. Patient refused new PIV, unable to use ceftriaxone. Adjusted to Augmentin 500mg Q8H (smaller tablet).  Supportive care  RESOLVED    Awaiting action for housing  Assessment & Plan  It is evident patient does not capacity  Will apply for guardianship  Patient to discharge to group home  QuantiFERON negative 10/2022    Closed fracture of nasal bone  Assessment & Plan  Mildly displaced and angulated right nasal bone fracture    Spoke with ENT, tried to perform bedside reduction but patient declined.  PT/OT  Pain regimen ordered    Hypomagnesemia- (present on admission)  Assessment & Plan  Patient does not have PIV  Replacing via PO  RESOLVED    Advance care planning  Assessment & Plan  Patient wishes to be DNR/DNI    Hypokalemia  Assessment & Plan  Monitor and replace  RESOLVED    Acute respiratory failure with hypoxia (HCC)  Assessment & Plan  SpO2 86% on room  air  Rhonchi on HPI exam, RR 22 upon admission  - continue 2LNC, wean as possible given patient is from [facility]  - treating pneumonia.  RESOLVED on RA    Hyponatremia- (present on admission)  Assessment & Plan  133  Decreasing. Likely due to dehydration. Pt does not have PIV and does not want another to be placed.  Encourage hydration, avoid over use of free water.  RESOLVED         VTE prophylaxis: Xarelto 10 mg daily as prophylaxis    I have performed a physical exam and reviewed and updated ROS and Plan today (1/12/2023). In review of yesterday's note (1/11/2023), there are no changes except as documented above.

## 2023-01-13 NOTE — PROGRESS NOTES
[facility] Medicine Daily Progress Note    Date of Service  1/13/2023    Chief Complaint  Alyssa Funez is a 89 y.o. female admitted 12/31/2022 with GLF    Hospital Course  This is an 89-year-old female with past medical history of psychiatric disorder, lobectomy in 1968, and asthma who was admitted on 12/31/2022 after sustaining a fall at [facility], significant for acute hypoxemic respiratory failure secondary to CAP and nasal fracture.    ENT was consulted for patient's nasal fracture, she refused fracture to be reduced at bedside.    Patient initially required oxygen, but she has been weaned off of this as she completed her antibiotic course for her pneumonia.  Patient later required 1 L of oxygen, tested positive for influenza, started on Tamiflu.    Patient evaluated by PT/OT with no further recommendations.  This is a difficult discharge to case management is assisting in, given patient is homeless and unable to care for herself, needs some assistance with ADLs, and unable to discharge back to [facility].     Case management assisting in setting up patient with a group home.  QuantiFERON gold negative 1/2022.    Interval Problem Update  It is evident patient does not capacity  Will apply for guardianship  Patient to discharge to group home, to be seen by them sometime today.  QuantiFERON negative 10/2022    Patient with productive cough, chest x-ray negative.  Patient swabbed for COVID/influenza/RSV -positive for influenza.  Patient started on Tamiflu.  Supportive care, continue to wean off oxygen as tolerated.    I have discussed this patient's plan of care and discharge plan at IDT rounds today with Case Management, Nursing, Nursing leadership, and other members of the IDT team.    Consultants/Specialty  ENT    Code Status  DNAR/DNI    Disposition  Patient is medically cleared for discharge.   Anticipate discharge to   .  I have placed the appropriate orders for post-discharge needs.    Review of  Systems  Review of Systems   Musculoskeletal:  Positive for falls and joint pain.   All other systems reviewed and are negative.     Physical Exam  Temp:  [36.6 °C (97.9 °F)-37.1 °C (98.7 °F)] 36.9 °C (98.5 °F)  Pulse:  [60-72] 68  Resp:  [16-19] 16  BP: ()/(37-63) 101/50  SpO2:  [92 %-96 %] 92 %    Physical Exam  Vitals and nursing note reviewed.   Constitutional:       General: She is not in acute distress.     Appearance: Normal appearance.   HENT:      Head: Normocephalic and atraumatic.      Mouth/Throat:      Mouth: Mucous membranes are moist.      Pharynx: No oropharyngeal exudate.   Eyes:      Extraocular Movements: Extraocular movements intact.      Pupils: Pupils are equal, round, and reactive to light.   Cardiovascular:      Rate and Rhythm: Normal rate and regular rhythm.      Pulses: Normal pulses.      Heart sounds: No murmur heard.    No friction rub. No gallop.   Pulmonary:      Effort: Pulmonary effort is normal. No respiratory distress.      Breath sounds: No wheezing, rhonchi or rales.   Abdominal:      General: Bowel sounds are normal. There is no distension.      Palpations: Abdomen is soft. There is no mass.      Tenderness: There is no abdominal tenderness.   Musculoskeletal:         General: No swelling or tenderness. Normal range of motion.      Cervical back: Normal range of motion. No rigidity. No muscular tenderness.      Right lower leg: No edema.      Left lower leg: No edema.   Skin:     General: Skin is warm and dry.      Capillary Refill: Capillary refill takes less than 2 seconds.      Findings: No erythema or rash.   Neurological:      General: No focal deficit present.      Mental Status: She is alert and oriented to person, place, and time.      Motor: No weakness.      Gait: Gait normal.       Fluids    Intake/Output Summary (Last 24 hours) at 1/13/2023 1315  Last data filed at 1/13/2023 0900  Gross per 24 hour   Intake 1200 ml   Output --   Net 1200 ml       Laboratory                         Imaging  DX-CHEST-PORTABLE (1 VIEW)   Final Result      Improving airspace opacity in the right lung.      DX-CHEST-PORTABLE (1 VIEW)   Final Result      1.  Extensive right perihilar and lower lobe airspace disease with bronchial wall thickening. Findings could be due to contusion or multifocal pneumonia.   2.  Mildly enlarged cardiac silhouette with possible changes of the lateral right edema.      CT-CSPINE WITHOUT PLUS RECONS   Final Result      1.  There is no acute fracture of the cervical spine.   2.  There is degenerative disc disease and arthropathy predominantly at the C5-6 and C6-7 levels.   3.  Hazy opacities in the right upper lobe could represent edema or pneumonitis.         CT-MAXILLOFACIAL W/O PLUS RECONS   Final Result      1.  Mildly displaced and angulated right nasal bone fracture.   2.  Right maxillary sinus disease and some frothy secretions in the ethmoid sphenoid sinuses. Correlate for evidence of sinusitis.      CT-HEAD W/O   Final Result         1.  No acute intracranial abnormality.              Assessment/Plan  * Community acquired pneumonia of right lung  Assessment & Plan  Patient presents with cough, hypoxia  Elevated procal 3.88, pending repeat.  WBC 11.4, now improved.  Blood cultures pending  Azithro. Patient refused new PIV, unable to use ceftriaxone. Adjusted to Augmentin 500mg Q8H (smaller tablet).  Supportive care  RESOLVED    Awaiting action for housing  Assessment & Plan  It is evident patient does not capacity  Will apply for guardianship  Patient to discharge to group home  QuantiFERON negative 10/2022    Closed fracture of nasal bone  Assessment & Plan  Mildly displaced and angulated right nasal bone fracture    Spoke with ENT, tried to perform bedside reduction but patient declined.  PT/OT  Pain regimen ordered    Influenza  Assessment & Plan  Tested positive on 1/13  Started patient on Tamiflu  Supportive care  Patient is currently on 1 L of  oxygen  Wean as tolerated    Hypomagnesemia- (present on admission)  Assessment & Plan  Patient does not have PIV  Replacing via PO  RESOLVED    Advance care planning  Assessment & Plan  Patient wishes to be DNR/DNI    Hypokalemia  Assessment & Plan  Monitor and replace  RESOLVED    Acute respiratory failure with hypoxia (HCC)  Assessment & Plan  SpO2 86% on room air  Rhonchi on HPI exam, RR 22 upon admission  - continue 2LNC, wean as possible given patient is from [facility]  - treating pneumonia.  RESOLVED on RA    Hyponatremia- (present on admission)  Assessment & Plan  133  Decreasing. Likely due to dehydration. Pt does not have PIV and does not want another to be placed.  Encourage hydration, avoid over use of free water.  RESOLVED         VTE prophylaxis: Xarelto 10 mg daily as prophylaxis    I have performed a physical exam and reviewed and updated ROS and Plan today (1/13/2023). In review of yesterday's note (1/12/2023), there are no changes except as documented above.

## 2023-01-14 PROCEDURE — 770001 HCHG ROOM/CARE - MED/SURG/GYN PRIV*

## 2023-01-14 PROCEDURE — 99232 SBSQ HOSP IP/OBS MODERATE 35: CPT | Performed by: GENERAL PRACTICE

## 2023-01-14 PROCEDURE — 700102 HCHG RX REV CODE 250 W/ 637 OVERRIDE(OP): Performed by: INTERNAL MEDICINE

## 2023-01-14 PROCEDURE — A9270 NON-COVERED ITEM OR SERVICE: HCPCS | Performed by: INTERNAL MEDICINE

## 2023-01-14 PROCEDURE — 700102 HCHG RX REV CODE 250 W/ 637 OVERRIDE(OP): Performed by: GENERAL PRACTICE

## 2023-01-14 PROCEDURE — A9270 NON-COVERED ITEM OR SERVICE: HCPCS | Performed by: GENERAL PRACTICE

## 2023-01-14 RX ADMIN — GUAIFENESIN 1200 MG: 600 TABLET, EXTENDED RELEASE ORAL at 17:12

## 2023-01-14 RX ADMIN — Medication 500 MG: at 05:58

## 2023-01-14 RX ADMIN — ACETAMINOPHEN 650 MG: 325 TABLET, FILM COATED ORAL at 09:10

## 2023-01-14 RX ADMIN — Medication 500 MG: at 17:11

## 2023-01-14 RX ADMIN — OSELTAMIVIR PHOSPHATE 75 MG: 75 CAPSULE ORAL at 17:11

## 2023-01-14 RX ADMIN — ACETAMINOPHEN 650 MG: 325 TABLET, FILM COATED ORAL at 17:12

## 2023-01-14 RX ADMIN — ACETAMINOPHEN 650 MG: 325 TABLET, FILM COATED ORAL at 22:00

## 2023-01-14 RX ADMIN — OSELTAMIVIR PHOSPHATE 75 MG: 75 CAPSULE ORAL at 05:58

## 2023-01-14 RX ADMIN — RIVAROXABAN 10 MG: 10 TABLET, FILM COATED ORAL at 17:12

## 2023-01-14 RX ADMIN — OMEPRAZOLE 20 MG: 20 CAPSULE, DELAYED RELEASE ORAL at 05:58

## 2023-01-14 RX ADMIN — GUAIFENESIN 1200 MG: 600 TABLET, EXTENDED RELEASE ORAL at 05:58

## 2023-01-14 ASSESSMENT — PAIN DESCRIPTION - PAIN TYPE
TYPE: ACUTE PAIN

## 2023-01-14 ASSESSMENT — ENCOUNTER SYMPTOMS: FALLS: 1

## 2023-01-14 NOTE — PROGRESS NOTES
Patient BP 89/54 at 1540. MD notified.    MD ordered bolus. Patient currently does not have IV access and refused IV access. Repeat BP at 1720 was 94/53.     BP at 1721 was 98/60.     Patient educated on risks, and verbalized understanding.

## 2023-01-14 NOTE — PROGRESS NOTES
Report received from NOC RN and assumed patient care at 0700. Patient is A&Ox 4, on 3L nasal canula, and the patient refused the bed alarm  Patient reporting a pain level of 0/10. Call light within reach and bed in lowest position. Reinforced the need to call for assistance. Plan of care discussed and patient does not have any further needs at this time.     Yes

## 2023-01-14 NOTE — CARE PLAN
The patient is Stable - Low risk of patient condition declining or worsening    Shift Goals  Clinical Goals: saftey  Patient Goals: comfort, rest  Family Goals: N/A    Progress made toward(s) clinical / shift goals:  patient educated on safety precautions, patient had no falls during shift, patient is currently resting, comfort measures in place for discomfort        Problem: Knowledge Deficit - Standard  Goal: Patient and family/care givers will demonstrate understanding of plan of care, disease process/condition, diagnostic tests and medications  Outcome: Progressing     Problem: Pain - Standard  Goal: Alleviation of pain or a reduction in pain to the patient’s comfort goal  Outcome: Progressing     Problem: Fall Risk  Goal: Patient will remain free from falls  Outcome: Progressing     Problem: Skin Integrity  Goal: Skin integrity is maintained or improved  Outcome: Progressing       Patient is not progressing towards the following goals:

## 2023-01-14 NOTE — CARE PLAN
The patient is Stable - Low risk of patient condition declining or worsening    Shift Goals  Clinical Goals: saftey  Patient Goals: comfort, rest  Family Goals: N/A    Progress made toward(s) clinical / shift goals: patient remains safe, patient is assisted when ambulating, individual comfort measures in place    Problem: Knowledge Deficit - Standard  Goal: Patient and family/care givers will demonstrate understanding of plan of care, disease process/condition, diagnostic tests and medications  Outcome: Progressing     Problem: Pain - Standard  Goal: Alleviation of pain or a reduction in pain to the patient’s comfort goal  Outcome: Progressing     Problem: Fall Risk  Goal: Patient will remain free from falls  Outcome: Progressing     Problem: Skin Integrity  Goal: Skin integrity is maintained or improved  Outcome: Progressing           Patient is not progressing towards the following goals:

## 2023-01-14 NOTE — PROGRESS NOTES
Hospital Medicine Daily Progress Note    Date of Service  1/14/2023    Chief Complaint  Alyssa Funez is a 89 y.o. female admitted 12/31/2022 with GLF    Hospital Course  This is an 89-year-old female with past medical history of psychiatric disorder, lobectomy in 1968, and asthma who was admitted on 12/31/2022 after sustaining a fall at [facility], significant for acute hypoxemic respiratory failure secondary to CAP and nasal fracture.    ENT was consulted for patient's nasal fracture, she refused fracture to be reduced at bedside.    Patient initially required oxygen, but she has been weaned off of this as she completed her antibiotic course for her pneumonia.  Patient later required 1 L of oxygen, tested positive for influenza, started on Tamiflu.    Patient evaluated by PT/OT with no further recommendations.  This is a difficult discharge to case management is assisting in, given patient is homeless and unable to care for herself, needs some assistance with ADLs, and unable to discharge back to [facility].     Case management assisting in setting up patient with a group home.  QuantiFERON gold negative 1/2022.    Interval Problem Update  It is evident patient does not capacity  Will apply for guardianship  Patient to discharge to group home  QuantiFERON negative 1/2023    Patient with productive cough, chest x-ray negative.  Patient swabbed for COVID/influenza/RSV -positive for influenza.  Patient started on Tamiflu.  Supportive care, continue to wean off oxygen as tolerated. Currently on 1L.    I have discussed this patient's plan of care and discharge plan at IDT rounds today with Case Management, Nursing, Nursing leadership, and other members of the IDT team.    Consultants/Specialty  ENT    Code Status  DNAR/DNI    Disposition  Patient is medically cleared for discharge.   Anticipate discharge to   .  I have placed the appropriate orders for post-discharge needs.    Review of Systems  Review of  Systems   Musculoskeletal:  Positive for falls and joint pain.   All other systems reviewed and are negative.     Physical Exam  Temp:  [36.2 °C (97.1 °F)-36.4 °C (97.5 °F)] 36.2 °C (97.1 °F)  Pulse:  [60-66] 66  Resp:  [16] 16  BP: (89-98)/(53-60) 98/57  SpO2:  [92 %-97 %] 97 %    Physical Exam  Vitals and nursing note reviewed.   Constitutional:       General: She is not in acute distress.     Appearance: Normal appearance.   HENT:      Head: Normocephalic and atraumatic.      Mouth/Throat:      Mouth: Mucous membranes are moist.      Pharynx: No oropharyngeal exudate.   Eyes:      Extraocular Movements: Extraocular movements intact.      Pupils: Pupils are equal, round, and reactive to light.   Cardiovascular:      Rate and Rhythm: Normal rate and regular rhythm.      Pulses: Normal pulses.      Heart sounds: No murmur heard.    No friction rub. No gallop.   Pulmonary:      Effort: Pulmonary effort is normal. No respiratory distress.      Breath sounds: No wheezing, rhonchi or rales.   Abdominal:      General: Bowel sounds are normal. There is no distension.      Palpations: Abdomen is soft. There is no mass.      Tenderness: There is no abdominal tenderness.   Musculoskeletal:         General: No swelling or tenderness. Normal range of motion.      Cervical back: Normal range of motion. No rigidity. No muscular tenderness.      Right lower leg: No edema.      Left lower leg: No edema.   Skin:     General: Skin is warm and dry.      Capillary Refill: Capillary refill takes less than 2 seconds.      Findings: No erythema or rash.   Neurological:      General: No focal deficit present.      Mental Status: She is alert and oriented to person, place, and time.      Motor: No weakness.      Gait: Gait normal.       Fluids  No intake or output data in the 24 hours ending 01/14/23 1458      Laboratory                        Imaging  DX-CHEST-PORTABLE (1 VIEW)   Final Result      Improving airspace opacity in the right  lung.      DX-CHEST-PORTABLE (1 VIEW)   Final Result      1.  Extensive right perihilar and lower lobe airspace disease with bronchial wall thickening. Findings could be due to contusion or multifocal pneumonia.   2.  Mildly enlarged cardiac silhouette with possible changes of the lateral right edema.      CT-CSPINE WITHOUT PLUS RECONS   Final Result      1.  There is no acute fracture of the cervical spine.   2.  There is degenerative disc disease and arthropathy predominantly at the C5-6 and C6-7 levels.   3.  Hazy opacities in the right upper lobe could represent edema or pneumonitis.         CT-MAXILLOFACIAL W/O PLUS RECONS   Final Result      1.  Mildly displaced and angulated right nasal bone fracture.   2.  Right maxillary sinus disease and some frothy secretions in the ethmoid sphenoid sinuses. Correlate for evidence of sinusitis.      CT-HEAD W/O   Final Result         1.  No acute intracranial abnormality.              Assessment/Plan  * Influenza  Assessment & Plan  Tested positive on 1/13  Started patient on Tamiflu  Supportive care  Patient is currently on 1 L of oxygen  Wean as tolerated    Awaiting action for housing  Assessment & Plan  It is evident patient does not capacity  Will apply for guardianship  Patient to discharge to group [facility]  QuantiFERON negative 10/2022    Closed fracture of nasal bone  Assessment & Plan  Mildly displaced and angulated right nasal bone fracture    Spoke with ENT, tried to perform bedside reduction but patient declined.  PT/OT  Pain regimen ordered    Hypomagnesemia- (present on admission)  Assessment & Plan  Patient does not have PIV  Replacing via PO  RESOLVED    Advance care planning  Assessment & Plan  Patient wishes to be DNR/DNI    Hypokalemia  Assessment & Plan  Monitor and replace  RESOLVED    Community acquired pneumonia of right lung  Assessment & Plan  Patient presents with cough, hypoxia  Elevated procal 3.88, pending repeat.  WBC 11.4, now improved.  Blood  cultures pending  Azithro. Patient refused new PIV, unable to use ceftriaxone. Adjusted to Augmentin 500mg Q8H (smaller tablet).  Supportive care  RESOLVED    Acute respiratory failure with hypoxia (HCC)  Assessment & Plan  SpO2 86% on room air  Rhonchi on HPI exam, RR 22 upon admission  - continue 2LNC, wean as possible given patient is from [facility]  - treating pneumonia.  RESOLVED on RA    Hyponatremia- (present on admission)  Assessment & Plan  133  Decreasing. Likely due to dehydration. Pt does not have PIV and does not want another to be placed.  Encourage hydration, avoid over use of free water.  RESOLVED         VTE prophylaxis: Xarelto 10 mg daily as prophylaxis    I have performed a physical exam and reviewed and updated ROS and Plan today (1/14/2023). In review of yesterday's note (1/13/2023), there are no changes except as documented above.

## 2023-01-14 NOTE — CARE PLAN
The patient is Stable - Low risk of patient condition declining or worsening    Shift Goals  Clinical Goals: safety  Patient Goals: comfort rest  Family Goals: N/A    Progress made toward(s) clinical / shift goals:       Problem: Knowledge Deficit - Standard  Goal: Patient and family/care givers will demonstrate understanding of plan of care, disease process/condition, diagnostic tests and medications  Outcome: Progressing     Problem: Pain - Standard  Goal: Alleviation of pain or a reduction in pain to the patient’s comfort goal  Outcome: Progressing     Problem: Fall Risk  Goal: Patient will remain free from falls  Outcome: Progressing     Problem: Skin Integrity  Goal: Skin integrity is maintained or improved  Outcome: Progressing

## 2023-01-15 PROCEDURE — A9270 NON-COVERED ITEM OR SERVICE: HCPCS | Performed by: INTERNAL MEDICINE

## 2023-01-15 PROCEDURE — 700102 HCHG RX REV CODE 250 W/ 637 OVERRIDE(OP): Performed by: INTERNAL MEDICINE

## 2023-01-15 PROCEDURE — A9270 NON-COVERED ITEM OR SERVICE: HCPCS | Performed by: GENERAL PRACTICE

## 2023-01-15 PROCEDURE — 770001 HCHG ROOM/CARE - MED/SURG/GYN PRIV*

## 2023-01-15 PROCEDURE — 99231 SBSQ HOSP IP/OBS SF/LOW 25: CPT | Performed by: GENERAL PRACTICE

## 2023-01-15 PROCEDURE — 700102 HCHG RX REV CODE 250 W/ 637 OVERRIDE(OP): Performed by: GENERAL PRACTICE

## 2023-01-15 RX ADMIN — OMEPRAZOLE 20 MG: 20 CAPSULE, DELAYED RELEASE ORAL at 06:02

## 2023-01-15 RX ADMIN — Medication 500 MG: at 06:02

## 2023-01-15 RX ADMIN — ACETAMINOPHEN 650 MG: 325 TABLET, FILM COATED ORAL at 10:08

## 2023-01-15 RX ADMIN — RIVAROXABAN 10 MG: 10 TABLET, FILM COATED ORAL at 17:01

## 2023-01-15 RX ADMIN — Medication 500 MG: at 18:16

## 2023-01-15 RX ADMIN — OSELTAMIVIR PHOSPHATE 75 MG: 75 CAPSULE ORAL at 17:02

## 2023-01-15 RX ADMIN — OSELTAMIVIR PHOSPHATE 75 MG: 75 CAPSULE ORAL at 06:02

## 2023-01-15 RX ADMIN — ACETAMINOPHEN 650 MG: 325 TABLET, FILM COATED ORAL at 21:00

## 2023-01-15 RX ADMIN — ACETAMINOPHEN 650 MG: 325 TABLET, FILM COATED ORAL at 14:15

## 2023-01-15 RX ADMIN — GUAIFENESIN 1200 MG: 600 TABLET, EXTENDED RELEASE ORAL at 17:01

## 2023-01-15 RX ADMIN — ACETAMINOPHEN 650 MG: 325 TABLET ORAL at 23:15

## 2023-01-15 RX ADMIN — GUAIFENESIN 1200 MG: 600 TABLET, EXTENDED RELEASE ORAL at 06:02

## 2023-01-15 ASSESSMENT — PAIN DESCRIPTION - PAIN TYPE
TYPE: ACUTE PAIN

## 2023-01-15 ASSESSMENT — ENCOUNTER SYMPTOMS: FALLS: 1

## 2023-01-15 NOTE — PROGRESS NOTES
Assumed patient care at 0700. Patient Aox3 (except to time). Patient denies need for pain interventions at this time. Patient belongings are nearby, bed set in low position, locked, and call light within reach. Will continue to monitor patient during shift accordingly. On 3L nc.

## 2023-01-15 NOTE — PROGRESS NOTES
Hospital Medicine Daily Progress Note    Date of Service  1/15/2023    Chief Complaint  Alyssa Funez is a 89 y.o. female admitted 12/31/2022 with GLF    Hospital Course  This is an 89-year-old female with past medical history of psychiatric disorder, lobectomy in 1968, and asthma who was admitted on 12/31/2022 after sustaining a fall at [facility], significant for acute hypoxemic respiratory failure secondary to CAP and nasal fracture.    ENT was consulted for patient's nasal fracture, she refused fracture to be reduced at bedside.    Patient initially required oxygen, but she has been weaned off of this as she completed her antibiotic course for her pneumonia.  Patient later required 1 L of oxygen, tested positive for influenza, started on Tamiflu.    Patient evaluated by PT/OT with no further recommendations.  This is a difficult discharge to case management is assisting in, given patient is homeless and unable to care for herself, needs some assistance with ADLs, and unable to discharge back to [facility].     Case management assisting in setting up patient with a group home.  QuantiFERON gold negative 1/2022.    Interval Problem Update  It is evident patient does not capacity  Will apply for guardianship  Patient to discharge to group home  QuantiFERON negative 1/2023    Patient with productive cough, chest x-ray negative.  Patient swabbed for COVID/influenza/RSV -positive for influenza.  Patient started on Tamiflu.  Supportive care, continue to wean off oxygen as tolerated. Currently on 1L.    I have discussed this patient's plan of care and discharge plan at IDT rounds today with Case Management, Nursing, Nursing leadership, and other members of the IDT team.    Consultants/Specialty  ENT    Code Status  DNAR/DNI    Disposition  Patient is medically cleared for discharge.   Anticipate discharge to   .  I have placed the appropriate orders for post-discharge needs.    Review of Systems  Review of  Systems   Musculoskeletal:  Positive for falls and joint pain.   All other systems reviewed and are negative.     Physical Exam  Temp:  [36.4 °C (97.6 °F)-37 °C (98.6 °F)] 37 °C (98.6 °F)  Pulse:  [62-74] 65  Resp:  [16-18] 16  BP: ()/(52-66) 92/52  SpO2:  [93 %-95 %] 95 %    Physical Exam  Vitals and nursing note reviewed.   Constitutional:       General: She is not in acute distress.     Appearance: Normal appearance.   HENT:      Head: Normocephalic and atraumatic.      Mouth/Throat:      Mouth: Mucous membranes are moist.      Pharynx: No oropharyngeal exudate.   Eyes:      Extraocular Movements: Extraocular movements intact.      Pupils: Pupils are equal, round, and reactive to light.   Cardiovascular:      Rate and Rhythm: Normal rate and regular rhythm.      Pulses: Normal pulses.      Heart sounds: No murmur heard.    No friction rub. No gallop.   Pulmonary:      Effort: Pulmonary effort is normal. No respiratory distress.      Breath sounds: No wheezing, rhonchi or rales.   Abdominal:      General: Bowel sounds are normal. There is no distension.      Palpations: Abdomen is soft. There is no mass.      Tenderness: There is no abdominal tenderness.   Musculoskeletal:         General: No swelling or tenderness. Normal range of motion.      Cervical back: Normal range of motion. No rigidity. No muscular tenderness.      Right lower leg: No edema.      Left lower leg: No edema.   Skin:     General: Skin is warm and dry.      Capillary Refill: Capillary refill takes less than 2 seconds.      Findings: No erythema or rash.   Neurological:      General: No focal deficit present.      Mental Status: She is alert and oriented to person, place, and time.      Motor: No weakness.      Gait: Gait normal.       Fluids    Intake/Output Summary (Last 24 hours) at 1/15/2023 1521  Last data filed at 1/15/2023 1100  Gross per 24 hour   Intake 480 ml   Output --   Net 480 ml         Laboratory                         Imaging  DX-CHEST-PORTABLE (1 VIEW)   Final Result      Improving airspace opacity in the right lung.      DX-CHEST-PORTABLE (1 VIEW)   Final Result      1.  Extensive right perihilar and lower lobe airspace disease with bronchial wall thickening. Findings could be due to contusion or multifocal pneumonia.   2.  Mildly enlarged cardiac silhouette with possible changes of the lateral right edema.      CT-CSPINE WITHOUT PLUS RECONS   Final Result      1.  There is no acute fracture of the cervical spine.   2.  There is degenerative disc disease and arthropathy predominantly at the C5-6 and C6-7 levels.   3.  Hazy opacities in the right upper lobe could represent edema or pneumonitis.         CT-MAXILLOFACIAL W/O PLUS RECONS   Final Result      1.  Mildly displaced and angulated right nasal bone fracture.   2.  Right maxillary sinus disease and some frothy secretions in the ethmoid sphenoid sinuses. Correlate for evidence of sinusitis.      CT-HEAD W/O   Final Result         1.  No acute intracranial abnormality.              Assessment/Plan  * Influenza  Assessment & Plan  Tested positive on 1/13  Started patient on Tamiflu  Supportive care  Patient is currently on 1 L of oxygen  Wean as tolerated    Awaiting action for housing  Assessment & Plan  It is evident patient does not capacity  Will apply for guardianship  Patient to discharge to group [place]  QuantiFERON negative 10/2022    Closed fracture of nasal bone  Assessment & Plan  Mildly displaced and angulated right nasal bone fracture    Spoke with ENT, tried to perform bedside reduction but patient declined.  PT/OT  Pain regimen ordered    Hypomagnesemia- (present on admission)  Assessment & Plan  Patient does not have PIV  Replacing via PO  RESOLVED    Advance care planning  Assessment & Plan  Patient wishes to be DNR/DNI    Hypokalemia  Assessment & Plan  Monitor and replace  RESOLVED    Community acquired pneumonia of right lung  Assessment & Plan  Patient  presents with cough, hypoxia  Elevated procal 3.88, pending repeat.  WBC 11.4, now improved.  Blood cultures pending  Azithro. Patient refused new PIV, unable to use ceftriaxone. Adjusted to Augmentin 500mg Q8H (smaller tablet).  Supportive care  RESOLVED    Acute respiratory failure with hypoxia (HCC)  Assessment & Plan  SpO2 86% on room air  Rhonchi on HPI exam, RR 22 upon admission  - continue 2LNC, wean as possible given patient is from [facility]  - treating pneumonia.  RESOLVED on RA    Hyponatremia- (present on admission)  Assessment & Plan  133  Decreasing. Likely due to dehydration. Pt does not have PIV and does not want another to be placed.  Encourage hydration, avoid over use of free water.  RESOLVED         VTE prophylaxis: Xarelto 10 mg daily as prophylaxis    I have performed a physical exam and reviewed and updated ROS and Plan today (1/15/2023). In review of yesterday's note (1/14/2023), there are no changes except as documented above.

## 2023-01-15 NOTE — CARE PLAN
The patient is Watcher - Medium risk of patient condition declining or worsening    Shift Goals  Clinical Goals: Free of falls during shift  Patient Goals: comfort rest  Family Goals: mariposa    Progress made toward(s) clinical / shift goals:  No noted falls during shift.    Patient is not progressing towards the following goals:      Problem: Fall Risk  Goal: Patient will remain free from falls  Outcome: Progressing

## 2023-01-16 LAB
SARS-COV+SARS-COV-2 AG RESP QL IA.RAPID: NOTDETECTED
SPECIMEN SOURCE: NORMAL

## 2023-01-16 PROCEDURE — RXMED WILLOW AMBULATORY MEDICATION CHARGE: Performed by: GENERAL PRACTICE

## 2023-01-16 PROCEDURE — A9270 NON-COVERED ITEM OR SERVICE: HCPCS | Performed by: INTERNAL MEDICINE

## 2023-01-16 PROCEDURE — 770001 HCHG ROOM/CARE - MED/SURG/GYN PRIV*

## 2023-01-16 PROCEDURE — 700102 HCHG RX REV CODE 250 W/ 637 OVERRIDE(OP): Performed by: INTERNAL MEDICINE

## 2023-01-16 PROCEDURE — 700102 HCHG RX REV CODE 250 W/ 637 OVERRIDE(OP): Performed by: GENERAL PRACTICE

## 2023-01-16 PROCEDURE — A9270 NON-COVERED ITEM OR SERVICE: HCPCS | Performed by: GENERAL PRACTICE

## 2023-01-16 PROCEDURE — 99231 SBSQ HOSP IP/OBS SF/LOW 25: CPT | Performed by: GENERAL PRACTICE

## 2023-01-16 PROCEDURE — 87426 SARSCOV CORONAVIRUS AG IA: CPT

## 2023-01-16 RX ORDER — OMEPRAZOLE 20 MG/1
20 CAPSULE, DELAYED RELEASE ORAL DAILY
Qty: 30 CAPSULE | Refills: 0 | Status: SHIPPED | OUTPATIENT
Start: 2023-01-17 | End: 2023-02-17 | Stop reason: SDUPTHER

## 2023-01-16 RX ADMIN — OMEPRAZOLE 20 MG: 20 CAPSULE, DELAYED RELEASE ORAL at 04:24

## 2023-01-16 RX ADMIN — RIVAROXABAN 10 MG: 10 TABLET, FILM COATED ORAL at 16:59

## 2023-01-16 RX ADMIN — GUAIFENESIN 1200 MG: 600 TABLET, EXTENDED RELEASE ORAL at 04:24

## 2023-01-16 RX ADMIN — ACETAMINOPHEN 650 MG: 325 TABLET, FILM COATED ORAL at 09:14

## 2023-01-16 RX ADMIN — ACETAMINOPHEN 650 MG: 325 TABLET, FILM COATED ORAL at 20:10

## 2023-01-16 RX ADMIN — OSELTAMIVIR PHOSPHATE 75 MG: 75 CAPSULE ORAL at 16:59

## 2023-01-16 RX ADMIN — Medication 500 MG: at 16:59

## 2023-01-16 RX ADMIN — OSELTAMIVIR PHOSPHATE 75 MG: 75 CAPSULE ORAL at 04:24

## 2023-01-16 RX ADMIN — Medication 500 MG: at 04:24

## 2023-01-16 ASSESSMENT — PAIN DESCRIPTION - PAIN TYPE
TYPE: ACUTE PAIN

## 2023-01-16 ASSESSMENT — ENCOUNTER SYMPTOMS: FALLS: 1

## 2023-01-16 NOTE — FACE TO FACE
Face to Face Supporting Documentation - Home Health    The encounter with this patient was in whole or in part the primary reason for home health admission.    Date of encounter:   Patient:                    MRN:                       YOB: 2023  Alyssa Funez  [id number]  9/24/1933     Home health to see patient for:  Skilled Nursing care for assessment, interventions & education, Home health aide, Physical Therapy evaluation and treatment, and Occupational therapy evaluation and treatment    Skilled need for:  New Onset Medical Diagnosis Debility, Pneumonia, Influenza    Skilled nursing interventions to include:  Comment: PT/OT/HH    Homebound status evidenced by:  Need the aid of supportive devices such as crutches, canes, wheelchairs or walkers. Leaving home requires a considerable and taxing effort. There is a normal inability to leave the home.    Community Physician to provide follow up care: Pcp Pt States None     Optional Interventions? No      I certify the face to face encounter for this home health care referral meets the CMS requirements and the encounter/clinical assessment with the patient was, in whole, or in part, for the medical condition(s) listed above, which is the primary reason for home health care. Based on my clinical findings: the service(s) are medically necessary, support the need for home health care, and the homebound criteria are met.  I certify that this patient has had a face to face encounter by myself.  Cony Jolley D.O. - NPI: [id number]

## 2023-01-16 NOTE — PROGRESS NOTES
[facility] Medicine Daily Progress Note    Date of Service  1/16/2023    Chief Complaint  Alyssa Funez is a 89 y.o. female admitted 12/31/2022 with GLF    Hospital Course  This is an 89-year-old female with past medical history of psychiatric disorder, lobectomy in 1968, and asthma who was admitted on 12/31/2022 after sustaining a fall at [facility], significant for acute hypoxemic respiratory failure secondary to CAP and nasal fracture.    ENT was consulted for patient's nasal fracture, she refused fracture to be reduced at bedside.    Patient initially required oxygen, but she has been weaned off of this as she completed her antibiotic course for her pneumonia.  Patient later required 1 L of oxygen, tested positive for influenza, started on Tamiflu.    Patient evaluated by PT/OT with no further recommendations.  This is a difficult discharge to case management is assisting in, given patient is homeless and unable to care for herself, needs some assistance with ADLs, and unable to discharge back to [facility].     Case management assisting in setting up patient with a group home.  QuantiFERON gold negative 1/2022.  COVID swab negative on 1/16.    Interval Problem Update  Patient with productive cough, chest x-ray negative.  Patient swabbed for COVID/influenza/RSV -positive for influenza.  Patient will complete Tamiflu.  Supportive care, continue to wean off oxygen as tolerated. Currently on 1L.    As per case management, patient will discharge to his group home likely tomorrow 1/17 if not Wednesday.  QuantiFERON negative, COVID swab NEGATIVE.    I have discussed this patient's plan of care and discharge plan at IDT rounds today with Case Management, Nursing, Nursing leadership, and other members of the IDT team.    Consultants/Specialty  ENT    Code Status  DNAR/DNI    Disposition  Patient is medically cleared for discharge.   Anticipate discharge to   .  I have placed the appropriate orders for  post-discharge needs.    Review of Systems  Review of Systems   Musculoskeletal:  Positive for falls and joint pain.   All other systems reviewed and are negative.     Physical Exam  Temp:  [36.7 °C (98.1 °F)] 36.7 °C (98.1 °F)  Pulse:  [60-61] 61  Resp:  [16] 16  BP: (105-122)/(56-65) 122/65  SpO2:  [90 %-95 %] 90 %    Physical Exam  Vitals and nursing note reviewed.   Constitutional:       General: She is not in acute distress.     Appearance: Normal appearance.   HENT:      Head: Normocephalic and atraumatic.      Mouth/Throat:      Mouth: Mucous membranes are moist.      Pharynx: No oropharyngeal exudate.   Eyes:      Extraocular Movements: Extraocular movements intact.      Pupils: Pupils are equal, round, and reactive to light.   Cardiovascular:      Rate and Rhythm: Normal rate and regular rhythm.      Pulses: Normal pulses.      Heart sounds: No murmur heard.    No friction rub. No gallop.   Pulmonary:      Effort: Pulmonary effort is normal. No respiratory distress.      Breath sounds: No wheezing, rhonchi or rales.   Abdominal:      General: Bowel sounds are normal. There is no distension.      Palpations: Abdomen is soft. There is no mass.      Tenderness: There is no abdominal tenderness.   Musculoskeletal:         General: No swelling or tenderness. Normal range of motion.      Cervical back: Normal range of motion. No rigidity. No muscular tenderness.      Right lower leg: No edema.      Left lower leg: No edema.   Skin:     General: Skin is warm and dry.      Capillary Refill: Capillary refill takes less than 2 seconds.      Findings: No erythema or rash.   Neurological:      General: No focal deficit present.      Mental Status: She is alert and oriented to person, place, and time.      Motor: No weakness.      Gait: Gait normal.       Fluids  No intake or output data in the 24 hours ending 01/16/23 1322      Laboratory                        Imaging  DX-CHEST-PORTABLE (1 VIEW)   Final Result       Improving airspace opacity in the right lung.      DX-CHEST-PORTABLE (1 VIEW)   Final Result      1.  Extensive right perihilar and lower lobe airspace disease with bronchial wall thickening. Findings could be due to contusion or multifocal pneumonia.   2.  Mildly enlarged cardiac silhouette with possible changes of the lateral right edema.      CT-CSPINE WITHOUT PLUS RECONS   Final Result      1.  There is no acute fracture of the cervical spine.   2.  There is degenerative disc disease and arthropathy predominantly at the C5-6 and C6-7 levels.   3.  Hazy opacities in the right upper lobe could represent edema or pneumonitis.         CT-MAXILLOFACIAL W/O PLUS RECONS   Final Result      1.  Mildly displaced and angulated right nasal bone fracture.   2.  Right maxillary sinus disease and some frothy secretions in the ethmoid sphenoid sinuses. Correlate for evidence of sinusitis.      CT-HEAD W/O   Final Result         1.  No acute intracranial abnormality.              Assessment/Plan  * Influenza  Assessment & Plan  Tested positive on 1/13  Started patient on Tamiflu  Supportive care  Patient is currently on 1 L of oxygen  Wean as tolerated    Awaiting action for housing  Assessment & Plan  It is evident patient does not capacity  Will apply for guardianship  Patient to discharge to group [place]  QuantiFERON negative 10/2022    Closed fracture of nasal bone  Assessment & Plan  Mildly displaced and angulated right nasal bone fracture    Spoke with ENT, tried to perform bedside reduction but patient declined.  PT/OT  Pain regimen ordered    Hypomagnesemia- (present on admission)  Assessment & Plan  Patient does not have PIV  Replacing via PO  RESOLVED    Advance care planning  Assessment & Plan  Patient wishes to be DNR/DNI    Hypokalemia  Assessment & Plan  Monitor and replace  RESOLVED    Community acquired pneumonia of right lung  Assessment & Plan  Patient presents with cough, hypoxia  Elevated procal 3.88,  pending repeat.  WBC 11.4, now improved.  Blood cultures pending  Azithro. Patient refused new PIV, unable to use ceftriaxone. Adjusted to Augmentin 500mg Q8H (smaller tablet).  Supportive care  RESOLVED    Acute respiratory failure with hypoxia (HCC)  Assessment & Plan  SpO2 86% on room air  Rhonchi on HPI exam, RR 22 upon admission  - continue 2LNC, wean as possible given patient is from [facility]  - treating pneumonia.  RESOLVED on RA    Hyponatremia- (present on admission)  Assessment & Plan  133  Decreasing. Likely due to dehydration. Pt does not have PIV and does not want another to be placed.  Encourage hydration, avoid over use of free water.  RESOLVED         VTE prophylaxis: Xarelto 10 mg daily as prophylaxis    I have performed a physical exam and reviewed and updated ROS and Plan today (1/16/2023). In review of yesterday's note (1/15/2023), there are no changes except as documented above.

## 2023-01-16 NOTE — DISCHARGE PLANNING
Transport Request Received: 1/16/2023 AT 1243    Transport Company: [employer]    Transport Date: 1/17/2023  Time: 1100    Destination: [facility] AT [address] [facility]    Notified care team of scheduled transport via [employer].      If there are any changes needed to the DC transportation scheduled, please contact [facility] at ext. [phone] between the hours of 2727-1166 Mon-Fri. If outside those hours, contact the ED Case Manager at ext. [phone].

## 2023-01-16 NOTE — DISCHARGE SUMMARY
Discharge Summary    CHIEF COMPLAINT ON ADMISSION  Chief Complaint   Patient presents with    T-5000 GLF     Pt DARCY FLORES from [facility]. Pt was sleeping, fell out of bed, hit face on ground. Unknown LOC. Takes ASA daily. Abrasion & swelling to bridge of nose. Denies head or neck pain. Pt recently d/c'd after hospitalization with pneumonia.        Reason for Admission  TBI    Admission Date  12/31/2022     CODE STATUS  DNAR/DNI    HPI & HOSPITAL COURSE  This is an 89-year-old female with past medical history of psychiatric disorder, lobectomy in 1968, and asthma who was admitted on 12/31/2022 after sustaining a fall at [facility], significant for acute hypoxemic respiratory failure secondary to CAP and nasal fracture.    ENT was consulted for patient's nasal fracture, she refused fracture to be reduced at bedside.    Patient initially required oxygen, but she has been weaned off of this as she completed her antibiotic course for her pneumonia.  Patient later required 1 L of oxygen, tested positive for influenza, started on Tamiflu.    Patient evaluated by PT/OT with no further recommendations.  This is a difficult discharge to case management is assisting in, given patient is homeless and unable to care for herself, needs some assistance with ADLs, and unable to discharge back to [facility].     Case management assisting in setting up patient with a group home.  QuantiFERON gold negative 1/2022.  COVID swab negative on 1/16.    Therefore, she is discharged in good and stable condition to group home.    The patient met 2-midnight criteria for an inpatient stay at the time of discharge.      FOLLOW UP ITEMS POST DISCHARGE  Primary care physician    DISCHARGE DIAGNOSES  Principal Problem:    Influenza POA: Unknown  Active Problems:    Closed fracture of nasal bone POA: Unknown    Awaiting action for housing POA: Unknown    Hyponatremia POA: Yes    Acute respiratory failure with hypoxia (HCC) POA: Unknown     "Community acquired pneumonia of right lung POA: Unknown    Hypokalemia POA: Unknown    Advance care planning POA: Unknown    Hypomagnesemia POA: Yes  Resolved Problems:    * No resolved hospital problems. *      FOLLOW UP  Primary care physician    MEDICATIONS ON DISCHARGE     Medication List        START taking these medications        Instructions   omeprazole 20 MG delayed-release capsule  Start taking on: January 17, 2023  Commonly known as: PRILOSEC   Take 1 Capsule by mouth every day for 30 days.  Dose: 20 mg              Allergies  Allergies   Allergen Reactions    Prednisone Anaphylaxis    Avelox [Moxifloxacin Hcl In Nacl] Hives    Codeine Vomiting    Morphine Vomiting     Pt informed during admission interview that she gets \"terribly sick\" , violently nausea and vomiting. Present note for updating allergy status.    Cimetidine Rash       DIET  Orders Placed This Encounter   Procedures    Diet Order Diet: Regular     Standing Status:   Standing     Number of Occurrences:   1     Order Specific Question:   Diet:     Answer:   Regular [1]       ACTIVITY  As tolerated.  Weight bearing as tolerated    LINES, DRAINS, AND WOUNDS  This is an automated list. Peripheral IVs will be removed prior to discharge.                     MENTAL STATUS ON TRANSFER             CONSULTATIONS  None    PROCEDURES  None    LABORATORY  Lab Results   Component Value Date    SODIUM 135 01/05/2023    POTASSIUM 4.0 01/05/2023    CHLORIDE 97 01/05/2023    CO2 28 01/05/2023    GLUCOSE 103 (H) 01/05/2023    BUN 4 (L) 01/05/2023    CREATININE 0.80 01/05/2023        Lab Results   Component Value Date    WBC 9.6 01/04/2023    HEMOGLOBIN 13.3 01/04/2023    HEMATOCRIT 41.3 01/04/2023    PLATELETCT 415 01/04/2023      DX-CHEST-PORTABLE (1 VIEW)   Final Result      Improving airspace opacity in the right lung.      DX-CHEST-PORTABLE (1 VIEW)   Final Result      1.  Extensive right perihilar and lower lobe airspace disease with bronchial wall " thickening. Findings could be due to contusion or multifocal pneumonia.   2.  Mildly enlarged cardiac silhouette with possible changes of the lateral right edema.      CT-CSPINE WITHOUT PLUS RECONS   Final Result      1.  There is no acute fracture of the cervical spine.   2.  There is degenerative disc disease and arthropathy predominantly at the C5-6 and C6-7 levels.   3.  Hazy opacities in the right upper lobe could represent edema or pneumonitis.         CT-MAXILLOFACIAL W/O PLUS RECONS   Final Result      1.  Mildly displaced and angulated right nasal bone fracture.   2.  Right maxillary sinus disease and some frothy secretions in the ethmoid sphenoid sinuses. Correlate for evidence of sinusitis.      CT-HEAD W/O   Final Result         1.  No acute intracranial abnormality.            Total time of the discharge process exceeds 45 minutes.

## 2023-01-16 NOTE — CARE PLAN
The patient is Stable - Low risk of patient condition declining or worsening    Shift Goals  Clinical Goals: safety  Patient Goals: comfort  Family Goals: mariposa    Progress made toward(s) clinical / shift goals:  POC discussed questions answered, refused vs, does not want to be bothered     Patient is not progressing towards the following goals:      Problem: Knowledge Deficit - Standard  Goal: Patient and family/care givers will demonstrate understanding of plan of care, disease process/condition, diagnostic tests and medications  Outcome: Progressing     Problem: Pain - Standard  Goal: Alleviation of pain or a reduction in pain to the patient’s comfort goal  Outcome: Progressing     Problem: Fall Risk  Goal: Patient will remain free from falls  Outcome: Progressing

## 2023-01-16 NOTE — PROGRESS NOTES
Report received. Care assumed. Routine assessment complete  Pt is aaox2 verbalizes needs and when wanting to use bathroom. 1 person assist hand held.   Upset that we woke her up and refused vital signs. Reported HA medicated per mar.   Fall precaution interventions in place. Bed low and locked, 3 rails up, bed alarm in place and on, treaded socks in place, bedside commitment. Frequent checks ongoing. Pt on 1 L nc

## 2023-01-16 NOTE — DISCHARGE PLANNING
Case Management Discharge Planning    Admission Date: 12/31/2022  GMLOS: 4  ALOS: 16    6-Clicks ADL Score: 22  6-Clicks Mobility Score: 18      Anticipated Discharge Dispo: Discharge Disposition: D/T to facility providing long term/supportive care (04)    DME Needed: No    Action(s) Taken: Updated Provider/Nurse on Discharge Plan    Escalations Completed: Long Length of Stay Committee     Medically Clear: Yes    Next Steps: provide update to Group Home if transport time is changed      Barriers to Discharge: Transportation    Is the patient up for discharge tomorrow: Yes    Is transport arranged for discharge disposition: Yes  ANGEL faxed Letter of Agreement to [facility], along with supporting documentation requested. Group Home informed requesting an 11:00 transport time for 1/17/23.    ANGEL faxed transport communication form to [employer] for a tentative transport time of 11:00 on 1/17/23 to  [facility]  [address] [facility] [address] [id number]

## 2023-01-16 NOTE — CARE PLAN
The patient is Stable - Low risk of patient condition declining or worsening    Shift Goals  Clinical Goals: Safety, will not have falls during shift  Patient Goals: comfort  Family Goals: mariposa    Progress made toward(s) clinical / shift goals:  No reported falls during shift.    Patient is not progressing towards the following goals:      Problem: Fall Risk  Goal: Patient will remain free from falls  Outcome: Progressing

## 2023-01-17 ENCOUNTER — PHARMACY VISIT (OUTPATIENT)
Dept: PHARMACY | Facility: MEDICAL CENTER | Age: 88
End: 2023-01-17
Payer: COMMERCIAL

## 2023-01-17 VITALS
HEIGHT: 60 IN | HEART RATE: 65 BPM | RESPIRATION RATE: 18 BRPM | SYSTOLIC BLOOD PRESSURE: 103 MMHG | DIASTOLIC BLOOD PRESSURE: 62 MMHG | OXYGEN SATURATION: 91 % | BODY MASS INDEX: 23.55 KG/M2 | TEMPERATURE: 97.8 F | WEIGHT: 119.93 LBS

## 2023-01-17 PROBLEM — E83.42 HYPOMAGNESEMIA: Status: RESOLVED | Noted: 2023-01-02 | Resolved: 2023-01-17

## 2023-01-17 PROBLEM — E87.6 HYPOKALEMIA: Status: RESOLVED | Noted: 2022-12-31 | Resolved: 2023-01-17

## 2023-01-17 PROBLEM — S02.2XXA CLOSED FRACTURE OF NASAL BONE: Status: RESOLVED | Noted: 2022-12-31 | Resolved: 2023-01-17

## 2023-01-17 PROBLEM — Z71.89 ADVANCE CARE PLANNING: Status: RESOLVED | Noted: 2022-12-31 | Resolved: 2023-01-17

## 2023-01-17 PROBLEM — J18.9 COMMUNITY ACQUIRED PNEUMONIA OF RIGHT LUNG: Status: RESOLVED | Noted: 2022-12-31 | Resolved: 2023-01-17

## 2023-01-17 PROBLEM — Z59.89 AWAITING ACTION FOR HOUSING: Status: RESOLVED | Noted: 2023-01-11 | Resolved: 2023-01-17

## 2023-01-17 PROBLEM — J96.01 ACUTE RESPIRATORY FAILURE WITH HYPOXIA (HCC): Status: RESOLVED | Noted: 2022-12-31 | Resolved: 2023-01-17

## 2023-01-17 PROBLEM — J11.1 INFLUENZA: Status: RESOLVED | Noted: 2023-01-13 | Resolved: 2023-01-17

## 2023-01-17 PROCEDURE — A9270 NON-COVERED ITEM OR SERVICE: HCPCS | Performed by: INTERNAL MEDICINE

## 2023-01-17 PROCEDURE — A9270 NON-COVERED ITEM OR SERVICE: HCPCS | Performed by: GENERAL PRACTICE

## 2023-01-17 PROCEDURE — 700102 HCHG RX REV CODE 250 W/ 637 OVERRIDE(OP): Performed by: GENERAL PRACTICE

## 2023-01-17 PROCEDURE — 700102 HCHG RX REV CODE 250 W/ 637 OVERRIDE(OP): Performed by: INTERNAL MEDICINE

## 2023-01-17 RX ADMIN — ACETAMINOPHEN 650 MG: 325 TABLET ORAL at 04:23

## 2023-01-17 RX ADMIN — Medication 500 MG: at 04:23

## 2023-01-17 RX ADMIN — ACETAMINOPHEN 650 MG: 325 TABLET ORAL at 09:14

## 2023-01-17 RX ADMIN — OSELTAMIVIR PHOSPHATE 75 MG: 75 CAPSULE ORAL at 04:23

## 2023-01-17 RX ADMIN — OMEPRAZOLE 20 MG: 20 CAPSULE, DELAYED RELEASE ORAL at 04:24

## 2023-01-17 ASSESSMENT — PAIN DESCRIPTION - PAIN TYPE
TYPE: ACUTE PAIN

## 2023-01-17 NOTE — DISCHARGE PLANNING
Case Management Discharge Planning    Admission Date: 12/31/2022  GMLOS: 4  ALOS: 17    6-Clicks ADL Score: 22  6-Clicks Mobility Score: 18      Anticipated Discharge Dispo: Discharge Disposition: D/T to facility providing intermediate/supportive care (04)    DME Needed: No    Action(s) Taken: Updated Provider/Nurse on Discharge Plan    Escalations Completed: Long Length of Stay    Medically Clear: Yes    Next Steps: n/a    Barriers to Discharge: None    Is the patient up for discharge tomorrow: No  Patient is orientated to self and location only.  Very hard of hearing  SW had to repeat himself numerous times that she will be leaving today at 11:00 to a group home where she will be taken care of.  Patient told SW that she likes the hospital, feels safe.  SW explained to patient she will be safe at the group home too, patient told NAGEL she will go

## 2023-01-17 NOTE — CARE PLAN
The patient is Stable - Low risk of patient condition declining or worsening    Patient is not progressing towards the following goals:      Problem: Knowledge Deficit - Standard  Goal: Patient and family/care givers will demonstrate understanding of plan of care, disease process/condition, diagnostic tests and medications  Outcome: Progressing     Problem: Pain - Standard  Goal: Alleviation of pain or a reduction in pain to the patient’s comfort goal  Outcome: Progressing     Problem: Fall Risk  Goal: Patient will remain free from falls  Outcome: Progressing

## 2023-01-17 NOTE — PLAN OF CARE (IOPOC)
Discharge summary completed by Dr. Jolley yesterday.   Patient informed she is going to group home.   Discharge order placed.    ROOPA Abarca.

## 2023-01-17 NOTE — PROGRESS NOTES
Patient was going to group home today. MGM came to get her and had to ge dischargeorders. Called doctor to get orders for patient. Patient discharge orders in the computer to discharge patient. Orders and dnr order given to the . Patient going to group home as per genoveva . Patient iv was already taken out she ws given several blankets to wrap patient up in and taken out by wheelchair. She had her medication given to the  to take with her. To the group home. Copy of discharge papers given to [facility].

## 2023-01-17 NOTE — DISCHARGE PLANNING
Medical Social Work  PC to patient's son Gilbert Funez [phone]; Sw provided an update on his mother's admit to the Hospital and her needs placement and a guardian.    Gilbert stated he thinks it would be best if his mother got a guardian and placed in a group home.  ANGEL explained guardianship process and being notified by the court of hearing dates.  Gilbert stated that he would not be interested in being notified.    PC to patient's daughter Tracie, [phone]; ANGEL provided an update on patient.  Does not want to be notified of any hearings nor the outcome.

## 2023-01-17 NOTE — PROGRESS NOTES
Report received, pt in room laying in bed. Reports HA medicated per mar 1 person assist to bathroom. On 2 l nc VSS bed alarm in place, bed low and locked, call light within reach, treaded socks on

## 2023-01-17 NOTE — CARE PLAN
The patient is Stable - Low risk of patient condition declining or worsening    Shift Goals  Clinical Goals: safety discharge  Patient Goals: comfort  Family Goals: ALEX    Progress made toward(s) clinical / shift goals:    Problem: Knowledge Deficit - Standard  Goal: Patient and family/care givers will demonstrate understanding of plan of care, disease process/condition, diagnostic tests and medications  Outcome: Met     Problem: Pain - Standard  Goal: Alleviation of pain or a reduction in pain to the patient’s comfort goal  Outcome: Met       Patient is not progressing towards the following goals:

## 2023-02-17 ENCOUNTER — PHARMACY VISIT (OUTPATIENT)
Dept: PHARMACY | Facility: MEDICAL CENTER | Age: 88
End: 2023-02-17
Payer: COMMERCIAL

## 2023-02-17 PROCEDURE — RXMED WILLOW AMBULATORY MEDICATION CHARGE: Performed by: HOSPITALIST

## 2023-02-17 RX ORDER — OMEPRAZOLE 20 MG/1
20 CAPSULE, DELAYED RELEASE ORAL DAILY
Qty: 30 CAPSULE | Refills: 0 | Status: SHIPPED | OUTPATIENT
Start: 2023-02-17 | End: 2023-02-17 | Stop reason: SDUPTHER

## 2023-02-17 RX ORDER — OMEPRAZOLE 20 MG/1
20 CAPSULE, DELAYED RELEASE ORAL DAILY
Qty: 30 CAPSULE | Refills: 1 | Status: SHIPPED | OUTPATIENT
Start: 2023-02-17 | End: 2023-03-02

## 2023-03-02 PROBLEM — Z75.8 DISCHARGE PLANNING ISSUES: Status: RESOLVED | Noted: 2022-10-14 | Resolved: 2023-03-02

## 2023-03-02 PROBLEM — R13.10 DYSPHAGIA: Status: ACTIVE | Noted: 2023-03-02

## 2023-03-02 PROBLEM — G89.29 CHRONIC PAIN: Status: ACTIVE | Noted: 2023-03-02

## 2023-03-02 PROBLEM — J18.9 PNEUMONIA DUE TO INFECTIOUS ORGANISM: Status: RESOLVED | Noted: 2022-12-28 | Resolved: 2023-03-02

## 2023-03-02 PROBLEM — N39.0 UTI (URINARY TRACT INFECTION): Status: RESOLVED | Noted: 2022-12-28 | Resolved: 2023-03-02

## 2023-03-02 PROBLEM — B36.9 FUNGAL SKIN INFECTION: Status: ACTIVE | Noted: 2023-03-02

## 2023-03-02 PROBLEM — A41.9 SEPSIS (HCC): Status: RESOLVED | Noted: 2022-12-28 | Resolved: 2023-03-02

## 2023-03-02 PROBLEM — R13.10 DYSPHAGIA: Status: RESOLVED | Noted: 2023-03-02 | Resolved: 2023-03-02

## 2023-03-02 PROBLEM — J96.01 ACUTE RESPIRATORY FAILURE WITH HYPOXIA (HCC): Status: RESOLVED | Noted: 2022-12-28 | Resolved: 2023-03-02

## 2023-03-02 PROBLEM — Z02.9 DISCHARGE PLANNING ISSUES: Status: RESOLVED | Noted: 2022-10-14 | Resolved: 2023-03-02

## 2023-03-02 PROBLEM — B35.1 ONYCHOMYCOSIS: Status: ACTIVE | Noted: 2023-03-02

## 2023-03-02 PROBLEM — E87.20 LACTIC ACID ACIDOSIS: Status: RESOLVED | Noted: 2022-12-28 | Resolved: 2023-03-02

## 2023-04-20 PROBLEM — H54.7 BLINDNESS: Status: ACTIVE | Noted: 2023-04-20

## 2023-04-20 PROBLEM — B36.9 FUNGAL SKIN INFECTION: Status: RESOLVED | Noted: 2023-03-02 | Resolved: 2023-04-20

## 2023-06-09 PROBLEM — G47.00 INSOMNIA: Status: ACTIVE | Noted: 2023-06-09

## 2023-06-09 PROBLEM — R60.0 LOCALIZED EDEMA: Status: ACTIVE | Noted: 2023-06-09

## 2023-08-23 PROBLEM — H91.90 HEARING LOSS: Status: ACTIVE | Noted: 2023-08-23

## 2023-10-04 PROBLEM — M25.571 RIGHT ANKLE PAIN: Status: ACTIVE | Noted: 2023-10-04

## 2023-11-16 PROBLEM — F22 PARANOIA (HCC): Status: ACTIVE | Noted: 2023-11-16

## 2023-12-27 PROBLEM — H54.3 VISION LOSS, BILATERAL: Status: ACTIVE | Noted: 2023-12-27

## 2024-01-25 PROBLEM — H61.21 IMPACTED CERUMEN OF RIGHT EAR: Status: ACTIVE | Noted: 2024-01-25

## 2024-04-18 PROBLEM — R13.10 ODYNOPHAGIA: Status: RESOLVED | Noted: 2023-03-02 | Resolved: 2024-04-18

## 2024-04-18 PROBLEM — R51.9 CHRONIC DAILY HEADACHE: Status: RESOLVED | Noted: 2022-10-12 | Resolved: 2024-04-18

## 2024-04-18 PROBLEM — H54.3 VISION LOSS, BILATERAL: Status: RESOLVED | Noted: 2023-12-27 | Resolved: 2024-04-18

## 2024-04-18 PROBLEM — U07.1 COVID-19: Status: RESOLVED | Noted: 2020-11-16 | Resolved: 2024-04-18

## 2024-07-01 PROBLEM — R44.3 HALLUCINATION: Status: ACTIVE | Noted: 2024-07-01

## 2024-07-01 PROBLEM — R44.1 VISUAL HALLUCINATIONS: Status: ACTIVE | Noted: 2024-07-01

## 2024-07-15 ENCOUNTER — HOSPITAL ENCOUNTER (OUTPATIENT)
Facility: MEDICAL CENTER | Age: 89
End: 2024-07-15
Payer: MEDICARE

## 2024-07-15 DIAGNOSIS — R05.3 CHRONIC COUGH: ICD-10-CM

## 2024-07-15 DIAGNOSIS — R60.0 LOCALIZED EDEMA: ICD-10-CM

## 2024-07-15 LAB
FORWARD REASON: SPWHY: NORMAL
FORWARDED TO LAB: SPWHR: NORMAL
SPECIMEN SENT (2ND): SPWT2: NORMAL
SPECIMEN SENT: SPWT1: NORMAL

## 2025-04-29 NOTE — THERAPY
"Speech Language Pathology   Cognitive-Linguistic Evaluation     Patient Name: Alyssa Funez  AGE:  89 y.o., SEX:  female  Medical Record #: [id number]  Today's Date: 1/2/2023     Precautions  Precautions: Fall Risk    HPI: 89 y/o F admitted 12/31/22 from [facility] after falling out of bed, hitting her face on the ground.  In the ED chest x-ray showed right sided consolidation concerning for community-acquired pneumonia, patient was started on IV antibiotics.  ENT was consulted for nasal fx though pt declined intervention. Pt also with hyponatremia.     CTH 12/31: no acute intracranial abnormality    PMHx includes psychiatric d/o, lobectomy 1968, asthma.     Prior Living Situation/Level of Function:  Pt was living at [facility]. She states she is I with ADLs and IADLs at baseline. She states she is not [place] but needs glasses for distance/reading.     Subjective:  Patient reports no hearing deficits though hearing impairment is clearly evident as SLP had to shout into pt's R ear for pt to hear ~75% of verbal communication. Pt states she needs glasses for distance and reading though they broke and she doesn't know where they are. No glasses present at bedside. Pt reports she thinks she will be able to live independently and is trying to get into a motel. \"I will decide what I do as long as I am alive.\"    Results:  COGNISTAT  Orientation: Moderate  Attention: Average  Comprehension: Mild  Repetition: unable to administer due to hearing impairment  Naming: Average  Memory: unable to administer due to hearing impairment  Calculations: Moderate  Similarities: Severe  Judgement: Severe    CLQT Clock Drawing: unable to administer due to vision impairment    Impressions:  Pt presents with moderate deficits in orientation and working memory and severe deficits in problem solving and reasoning. Suspect deficits are chronic and probably progressive, related to advancing age, significant hearing impairment and hx of " Patient "lobectomy. Verbal expression and sustained attention were intact. Formal cognitive assessment was limited by both hearing and visual impairments as pt was unable to hear certain test stimuli or perform any tasks involving reading or writing. Attempted to administer memory subtest of the COGNISTAT though pt was not able to hear the four words presented despite loud, clear speech and written cues in large bold print that she was unable to see. Pt was A&Ox2-3, though did not know the date, CHAD, year or her own age. Given problem solving scenarios, pt had difficulty providing any appropriate, reasonable solutions, stating either \"I don't know\" or stating an insufficient solution. For example, when asked what she would do if a pipe burst in her kitchen, pt replied, \"go get a mop.\" Pt had difficulty performing simple math calculations. Pt demonstrates poor insight into her limitations, denying having any hearing impairment and states she thinks she can still live independently because she's done so her whole life.     Suspect pt is at her premorbid baseline, given she does not have any acute abnormalities on her head CT. However, patient is not safe to live alone due to the combination of hearing, vision and cognitive impairments identified today.     Recommendations:  Supervision Needs Upon Discharge: The pt will benefit from direct assistance for the following IADLs: Medication management, Financial management, Appointment management, Household chores, Cooking.   Patient would benefit from long term supervised housing environment that can provide A with IADLs, such as an senior care or group home, if there is no family available to take pt in.       Please note, it is not within the scope of the SLP to determine capacity; please defer to medical team or psych for capacity evaluation if indicated.    Plan    Recommend Speech Therapy for Evaluation only for the following treatments:  Cognitive-Linguistic Training and Patient / " Family / Caregiver Education.    Discharge Recommendations: Anticipate that the patient will have no further speech therapy needs after discharge from the hospital (will need placement)    Objective       01/02/23 1451   Education Group   Education Provided Role of Speech Therapy;Traumatic Brain Injury / Cognitive-Linguistic   TBI / Cog-Ling Patient Response Patient;Acceptance;Explanation;Reinforcement Needed   Role of SLP Patient Response Patient;Acceptance;Explanation;Verbal Demonstration   Problem List   Problem List Cognitive-Linguistic Deficits;Hearing Deficit   Anticipated Discharge Needs   Discharge Recommendations Anticipate that the patient will have no further speech therapy needs after discharge from the hospital  (will need placement)   Therapy Recommendations Upon DC Not Indicated